# Patient Record
Sex: FEMALE | Race: WHITE | Employment: UNEMPLOYED | URBAN - METROPOLITAN AREA
[De-identification: names, ages, dates, MRNs, and addresses within clinical notes are randomized per-mention and may not be internally consistent; named-entity substitution may affect disease eponyms.]

---

## 2019-09-05 ENCOUNTER — APPOINTMENT (OUTPATIENT)
Dept: GENERAL RADIOLOGY | Age: 59
DRG: 061 | End: 2019-09-05
Attending: FAMILY MEDICINE
Payer: COMMERCIAL

## 2019-09-05 ENCOUNTER — APPOINTMENT (OUTPATIENT)
Dept: CT IMAGING | Age: 59
DRG: 061 | End: 2019-09-05
Attending: EMERGENCY MEDICINE
Payer: COMMERCIAL

## 2019-09-05 ENCOUNTER — HOSPITAL ENCOUNTER (INPATIENT)
Age: 59
LOS: 8 days | Discharge: HOME HEALTH | DRG: 061 | End: 2019-09-13
Attending: EMERGENCY MEDICINE | Admitting: FAMILY MEDICINE
Payer: COMMERCIAL

## 2019-09-05 DIAGNOSIS — R56.9 SEIZURE (HCC): Primary | ICD-10-CM

## 2019-09-05 DIAGNOSIS — I10 UNCONTROLLED HYPERTENSION: ICD-10-CM

## 2019-09-05 DIAGNOSIS — R41.82 ALTERED MENTAL STATUS, UNSPECIFIED ALTERED MENTAL STATUS TYPE: ICD-10-CM

## 2019-09-05 DIAGNOSIS — I47.20 VT (VENTRICULAR TACHYCARDIA): ICD-10-CM

## 2019-09-05 DIAGNOSIS — J44.9 CHRONIC OBSTRUCTIVE PULMONARY DISEASE, UNSPECIFIED COPD TYPE (HCC): ICD-10-CM

## 2019-09-05 DIAGNOSIS — R94.01 ABNORMAL EEG: ICD-10-CM

## 2019-09-05 PROBLEM — R79.89 ELEVATED SERUM CREATININE: Status: ACTIVE | Noted: 2019-09-05

## 2019-09-05 PROBLEM — R53.1 LEFT-SIDED WEAKNESS: Status: ACTIVE | Noted: 2019-09-05

## 2019-09-05 LAB
ALBUMIN SERPL-MCNC: 4.4 G/DL (ref 3.5–5)
ALBUMIN/GLOB SERPL: 1.1 {RATIO} (ref 1.1–2.2)
ALP SERPL-CCNC: 91 U/L (ref 45–117)
ALT SERPL-CCNC: 25 U/L (ref 12–78)
ANION GAP SERPL CALC-SCNC: 7 MMOL/L (ref 5–15)
ARTERIAL PATENCY WRIST A: ABNORMAL
AST SERPL-CCNC: 14 U/L (ref 15–37)
BASOPHILS # BLD: 0.2 K/UL (ref 0–0.1)
BASOPHILS NFR BLD: 1 % (ref 0–1)
BDY SITE: ABNORMAL
BILIRUB SERPL-MCNC: 0.5 MG/DL (ref 0.2–1)
BUN SERPL-MCNC: 20 MG/DL (ref 6–20)
BUN/CREAT SERPL: 18 (ref 12–20)
CALCIUM SERPL-MCNC: 9.3 MG/DL (ref 8.5–10.1)
CHLORIDE SERPL-SCNC: 101 MMOL/L (ref 97–108)
CO2 SERPL-SCNC: 32 MMOL/L (ref 21–32)
CREAT SERPL-MCNC: 1.13 MG/DL (ref 0.55–1.02)
DIFFERENTIAL METHOD BLD: ABNORMAL
EOSINOPHIL # BLD: 0.2 K/UL (ref 0–0.4)
EOSINOPHIL NFR BLD: 1 % (ref 0–7)
ERYTHROCYTE [DISTWIDTH] IN BLOOD BY AUTOMATED COUNT: 12.4 % (ref 11.5–14.5)
GAS FLOW.O2 O2 DELIVERY SYS: ABNORMAL L/MIN
GAS FLOW.O2 SETTING OXYMISER: 3.5 L/M
GLOBULIN SER CALC-MCNC: 3.9 G/DL (ref 2–4)
GLUCOSE SERPL-MCNC: 233 MG/DL (ref 65–100)
HCT VFR BLD AUTO: 45.5 % (ref 35–47)
HGB BLD-MCNC: 14.4 G/DL (ref 11.5–16)
IMM GRANULOCYTES # BLD AUTO: 0.3 K/UL (ref 0–0.04)
IMM GRANULOCYTES NFR BLD AUTO: 2 % (ref 0–0.5)
INR PPP: 1 (ref 0.9–1.1)
LYMPHOCYTES # BLD: 2.5 K/UL (ref 0.8–3.5)
LYMPHOCYTES NFR BLD: 15 % (ref 12–49)
MCH RBC QN AUTO: 31 PG (ref 26–34)
MCHC RBC AUTO-ENTMCNC: 31.6 G/DL (ref 30–36.5)
MCV RBC AUTO: 98.1 FL (ref 80–99)
MONOCYTES # BLD: 1.2 K/UL (ref 0–1)
MONOCYTES NFR BLD: 7 % (ref 5–13)
NEUTS SEG # BLD: 12.3 K/UL (ref 1.8–8)
NEUTS SEG NFR BLD: 74 % (ref 32–75)
NRBC # BLD: 0 K/UL (ref 0–0.01)
NRBC BLD-RTO: 0 PER 100 WBC
PCO2 BLDV: >90 MMHG (ref 41–51)
PH BLDV: 7.16 [PH] (ref 7.32–7.42)
PLATELET # BLD AUTO: 266 K/UL (ref 150–400)
PMV BLD AUTO: 11.4 FL (ref 8.9–12.9)
PO2 BLDV: 56 MMHG (ref 25–40)
POTASSIUM SERPL-SCNC: 3.7 MMOL/L (ref 3.5–5.1)
PROT SERPL-MCNC: 8.3 G/DL (ref 6.4–8.2)
PROTHROMBIN TIME: 9.9 SEC (ref 9–11.1)
RBC # BLD AUTO: 4.64 M/UL (ref 3.8–5.2)
RBC MORPH BLD: ABNORMAL
SODIUM SERPL-SCNC: 140 MMOL/L (ref 136–145)
SPECIMEN TYPE: ABNORMAL
WBC # BLD AUTO: 16.7 K/UL (ref 3.6–11)

## 2019-09-05 PROCEDURE — 70496 CT ANGIOGRAPHY HEAD: CPT

## 2019-09-05 PROCEDURE — 96375 TX/PRO/DX INJ NEW DRUG ADDON: CPT

## 2019-09-05 PROCEDURE — 74011636320 HC RX REV CODE- 636/320: Performed by: RADIOLOGY

## 2019-09-05 PROCEDURE — 70450 CT HEAD/BRAIN W/O DYE: CPT

## 2019-09-05 PROCEDURE — 80053 COMPREHEN METABOLIC PANEL: CPT

## 2019-09-05 PROCEDURE — 96366 THER/PROPH/DIAG IV INF ADDON: CPT

## 2019-09-05 PROCEDURE — 85025 COMPLETE CBC W/AUTO DIFF WBC: CPT

## 2019-09-05 PROCEDURE — 74011000258 HC RX REV CODE- 258: Performed by: RADIOLOGY

## 2019-09-05 PROCEDURE — 82803 BLOOD GASES ANY COMBINATION: CPT

## 2019-09-05 PROCEDURE — 65610000006 HC RM INTENSIVE CARE

## 2019-09-05 PROCEDURE — 74011000258 HC RX REV CODE- 258: Performed by: EMERGENCY MEDICINE

## 2019-09-05 PROCEDURE — 71045 X-RAY EXAM CHEST 1 VIEW: CPT

## 2019-09-05 PROCEDURE — 83036 HEMOGLOBIN GLYCOSYLATED A1C: CPT

## 2019-09-05 PROCEDURE — 95816 EEG AWAKE AND DROWSY: CPT | Performed by: FAMILY MEDICINE

## 2019-09-05 PROCEDURE — 31500 INSERT EMERGENCY AIRWAY: CPT

## 2019-09-05 PROCEDURE — 36415 COLL VENOUS BLD VENIPUNCTURE: CPT

## 2019-09-05 PROCEDURE — 96368 THER/DIAG CONCURRENT INF: CPT

## 2019-09-05 PROCEDURE — 85610 PROTHROMBIN TIME: CPT

## 2019-09-05 PROCEDURE — 99285 EMERGENCY DEPT VISIT HI MDM: CPT

## 2019-09-05 PROCEDURE — 93005 ELECTROCARDIOGRAM TRACING: CPT

## 2019-09-05 PROCEDURE — 74011000250 HC RX REV CODE- 250: Performed by: EMERGENCY MEDICINE

## 2019-09-05 PROCEDURE — 96365 THER/PROPH/DIAG IV INF INIT: CPT

## 2019-09-05 PROCEDURE — 3E03317 INTRODUCTION OF OTHER THROMBOLYTIC INTO PERIPHERAL VEIN, PERCUTANEOUS APPROACH: ICD-10-PCS | Performed by: EMERGENCY MEDICINE

## 2019-09-05 PROCEDURE — 74011250636 HC RX REV CODE- 250/636

## 2019-09-05 PROCEDURE — 94762 N-INVAS EAR/PLS OXIMTRY CONT: CPT

## 2019-09-05 PROCEDURE — 0042T CT CODE NEURO PERF W CBF: CPT

## 2019-09-05 PROCEDURE — 74011250636 HC RX REV CODE- 250/636: Performed by: EMERGENCY MEDICINE

## 2019-09-05 RX ORDER — LABETALOL HYDROCHLORIDE 5 MG/ML
10 INJECTION, SOLUTION INTRAVENOUS ONCE
Status: COMPLETED | OUTPATIENT
Start: 2019-09-05 | End: 2019-09-05

## 2019-09-05 RX ORDER — LABETALOL HYDROCHLORIDE 5 MG/ML
20 INJECTION, SOLUTION INTRAVENOUS ONCE
Status: COMPLETED | OUTPATIENT
Start: 2019-09-05 | End: 2019-09-05

## 2019-09-05 RX ORDER — SODIUM CHLORIDE 0.9 % (FLUSH) 0.9 %
10 SYRINGE (ML) INJECTION
Status: COMPLETED | OUTPATIENT
Start: 2019-09-05 | End: 2019-09-05

## 2019-09-05 RX ORDER — MAGNESIUM SULFATE 100 %
4 CRYSTALS MISCELLANEOUS AS NEEDED
Status: DISCONTINUED | OUTPATIENT
Start: 2019-09-05 | End: 2019-09-13 | Stop reason: HOSPADM

## 2019-09-05 RX ORDER — DEXTROSE 50 % IN WATER (D50W) INTRAVENOUS SYRINGE
25-50 AS NEEDED
Status: DISCONTINUED | OUTPATIENT
Start: 2019-09-05 | End: 2019-09-05 | Stop reason: CLARIF

## 2019-09-05 RX ORDER — SODIUM CHLORIDE 9 MG/ML
50 INJECTION, SOLUTION INTRAVENOUS ONCE
Status: COMPLETED | OUTPATIENT
Start: 2019-09-05 | End: 2019-09-06

## 2019-09-05 RX ORDER — ALBUTEROL SULFATE 90 UG/1
1 AEROSOL, METERED RESPIRATORY (INHALATION)
COMMUNITY

## 2019-09-05 RX ORDER — FUROSEMIDE 40 MG/1
40 TABLET ORAL DAILY
COMMUNITY
End: 2019-11-21 | Stop reason: SDUPTHER

## 2019-09-05 RX ORDER — INSULIN LISPRO 100 [IU]/ML
INJECTION, SOLUTION INTRAVENOUS; SUBCUTANEOUS EVERY 6 HOURS
Status: DISCONTINUED | OUTPATIENT
Start: 2019-09-05 | End: 2019-09-13 | Stop reason: HOSPADM

## 2019-09-05 RX ORDER — SODIUM CHLORIDE 9 MG/ML
50 INJECTION, SOLUTION INTRAVENOUS ONCE
Status: COMPLETED | OUTPATIENT
Start: 2019-09-05 | End: 2019-09-05

## 2019-09-05 RX ADMIN — LEVETIRACETAM 2000 MG: 100 INJECTION, SOLUTION INTRAVENOUS at 23:57

## 2019-09-05 RX ADMIN — ALTEPLASE 81 MG: KIT at 21:52

## 2019-09-05 RX ADMIN — LABETALOL HYDROCHLORIDE 20 MG: 5 INJECTION INTRAVENOUS at 22:05

## 2019-09-05 RX ADMIN — IOPAMIDOL 120 ML: 755 INJECTION, SOLUTION INTRAVENOUS at 21:40

## 2019-09-05 RX ADMIN — SODIUM CHLORIDE 5 MG/HR: 900 INJECTION, SOLUTION INTRAVENOUS at 21:55

## 2019-09-05 RX ADMIN — SODIUM CHLORIDE 50 ML: 900 INJECTION, SOLUTION INTRAVENOUS at 23:40

## 2019-09-05 RX ADMIN — LABETALOL HYDROCHLORIDE 10 MG: 5 INJECTION INTRAVENOUS at 21:52

## 2019-09-05 RX ADMIN — Medication 10 ML: at 21:40

## 2019-09-05 RX ADMIN — SODIUM CHLORIDE 100 ML: 900 INJECTION, SOLUTION INTRAVENOUS at 21:40

## 2019-09-05 RX ADMIN — SODIUM CHLORIDE 50 ML: 900 INJECTION, SOLUTION INTRAVENOUS at 22:52

## 2019-09-05 NOTE — Clinical Note
TRANSFER - OUT REPORT:  
 
Verbal report given to: tavon. Report consisted of patient's Situation, Background, Assessment and  
Recommendations(SBAR). Opportunity for questions and clarification was provided. Patient transported with a Registered Nurse. Patient transported to: Recovery room.

## 2019-09-06 ENCOUNTER — APPOINTMENT (OUTPATIENT)
Dept: MRI IMAGING | Age: 59
DRG: 061 | End: 2019-09-06
Attending: INTERNAL MEDICINE
Payer: COMMERCIAL

## 2019-09-06 ENCOUNTER — APPOINTMENT (OUTPATIENT)
Dept: GENERAL RADIOLOGY | Age: 59
DRG: 061 | End: 2019-09-06
Attending: EMERGENCY MEDICINE
Payer: COMMERCIAL

## 2019-09-06 ENCOUNTER — APPOINTMENT (OUTPATIENT)
Dept: NON INVASIVE DIAGNOSTICS | Age: 59
DRG: 061 | End: 2019-09-06
Attending: INTERNAL MEDICINE
Payer: COMMERCIAL

## 2019-09-06 ENCOUNTER — APPOINTMENT (OUTPATIENT)
Dept: GENERAL RADIOLOGY | Age: 59
DRG: 061 | End: 2019-09-06
Attending: FAMILY MEDICINE
Payer: COMMERCIAL

## 2019-09-06 LAB
AMMONIA PLAS-SCNC: 22 UMOL/L
AMPHET UR QL SCN: NEGATIVE
APAP SERPL-MCNC: <2 UG/ML (ref 10–30)
ATRIAL RATE: 92 BPM
BARBITURATES UR QL SCN: NEGATIVE
BENZODIAZ UR QL: POSITIVE
BNP SERPL-MCNC: 1065 PG/ML
CALCULATED P AXIS, ECG09: 57 DEGREES
CALCULATED R AXIS, ECG10: 66 DEGREES
CALCULATED T AXIS, ECG11: 33 DEGREES
CANNABINOIDS UR QL SCN: NEGATIVE
CHOLEST SERPL-MCNC: 175 MG/DL
CK MB CFR SERPL CALC: 1.6 % (ref 0–2.5)
CK MB SERPL-MCNC: 1.3 NG/ML (ref 5–25)
CK SERPL-CCNC: 81 U/L (ref 26–192)
COCAINE UR QL SCN: NEGATIVE
DIAGNOSIS, 93000: NORMAL
DRUG SCRN COMMENT,DRGCM: ABNORMAL
EST. AVERAGE GLUCOSE BLD GHB EST-MCNC: 151 MG/DL
EST. AVERAGE GLUCOSE BLD GHB EST-MCNC: 160 MG/DL
ETHANOL SERPL-MCNC: <10 MG/DL
GLUCOSE BLD STRIP.AUTO-MCNC: 125 MG/DL (ref 65–100)
GLUCOSE BLD STRIP.AUTO-MCNC: 162 MG/DL (ref 65–100)
HBA1C MFR BLD: 6.9 % (ref 4.2–6.3)
HBA1C MFR BLD: 7.2 % (ref 4.2–6.3)
HDLC SERPL-MCNC: 39 MG/DL
HDLC SERPL: 4.5 {RATIO} (ref 0–5)
LACTATE SERPL-SCNC: 1.5 MMOL/L (ref 0.4–2)
LDLC SERPL CALC-MCNC: 97.8 MG/DL (ref 0–100)
LIPID PROFILE,FLP: ABNORMAL
METHADONE UR QL: NEGATIVE
OPIATES UR QL: NEGATIVE
P-R INTERVAL, ECG05: 202 MS
PCP UR QL: NEGATIVE
Q-T INTERVAL, ECG07: 390 MS
QRS DURATION, ECG06: 104 MS
QTC CALCULATION (BEZET), ECG08: 482 MS
SALICYLATES SERPL-MCNC: <1.7 MG/DL (ref 2.8–20)
SERVICE CMNT-IMP: ABNORMAL
SERVICE CMNT-IMP: ABNORMAL
TRIGL SERPL-MCNC: 191 MG/DL (ref ?–150)
TROPONIN I SERPL-MCNC: 0.07 NG/ML
TSH SERPL DL<=0.05 MIU/L-ACNC: 0.83 UIU/ML (ref 0.36–3.74)
VENTRICULAR RATE, ECG03: 92 BPM
VLDLC SERPL CALC-MCNC: 38.2 MG/DL

## 2019-09-06 PROCEDURE — 84443 ASSAY THYROID STIM HORMONE: CPT

## 2019-09-06 PROCEDURE — 94002 VENT MGMT INPAT INIT DAY: CPT

## 2019-09-06 PROCEDURE — 71045 X-RAY EXAM CHEST 1 VIEW: CPT

## 2019-09-06 PROCEDURE — 70551 MRI BRAIN STEM W/O DYE: CPT

## 2019-09-06 PROCEDURE — 65610000006 HC RM INTENSIVE CARE

## 2019-09-06 PROCEDURE — 74011000250 HC RX REV CODE- 250: Performed by: NURSE PRACTITIONER

## 2019-09-06 PROCEDURE — 83880 ASSAY OF NATRIURETIC PEPTIDE: CPT

## 2019-09-06 PROCEDURE — 0BH17EZ INSERTION OF ENDOTRACHEAL AIRWAY INTO TRACHEA, VIA NATURAL OR ARTIFICIAL OPENING: ICD-10-PCS | Performed by: ANESTHESIOLOGY

## 2019-09-06 PROCEDURE — 83036 HEMOGLOBIN GLYCOSYLATED A1C: CPT

## 2019-09-06 PROCEDURE — 94640 AIRWAY INHALATION TREATMENT: CPT

## 2019-09-06 PROCEDURE — 95816 EEG AWAKE AND DROWSY: CPT | Performed by: INTERNAL MEDICINE

## 2019-09-06 PROCEDURE — 74011250636 HC RX REV CODE- 250/636: Performed by: FAMILY MEDICINE

## 2019-09-06 PROCEDURE — 74011636637 HC RX REV CODE- 636/637: Performed by: FAMILY MEDICINE

## 2019-09-06 PROCEDURE — 84484 ASSAY OF TROPONIN QUANT: CPT

## 2019-09-06 PROCEDURE — 82962 GLUCOSE BLOOD TEST: CPT

## 2019-09-06 PROCEDURE — 80307 DRUG TEST PRSMV CHEM ANLYZR: CPT

## 2019-09-06 PROCEDURE — 74011250636 HC RX REV CODE- 250/636: Performed by: ANESTHESIOLOGY

## 2019-09-06 PROCEDURE — 74018 RADEX ABDOMEN 1 VIEW: CPT

## 2019-09-06 PROCEDURE — 80061 LIPID PANEL: CPT

## 2019-09-06 PROCEDURE — 82140 ASSAY OF AMMONIA: CPT

## 2019-09-06 PROCEDURE — 77030008771 HC TU NG SALEM SUMP -A

## 2019-09-06 PROCEDURE — 5A1945Z RESPIRATORY VENTILATION, 24-96 CONSECUTIVE HOURS: ICD-10-PCS | Performed by: EMERGENCY MEDICINE

## 2019-09-06 PROCEDURE — 74011000258 HC RX REV CODE- 258: Performed by: NURSE PRACTITIONER

## 2019-09-06 PROCEDURE — 74011250636 HC RX REV CODE- 250/636: Performed by: INTERNAL MEDICINE

## 2019-09-06 PROCEDURE — 82550 ASSAY OF CK (CPK): CPT

## 2019-09-06 PROCEDURE — 74011250636 HC RX REV CODE- 250/636

## 2019-09-06 PROCEDURE — 74011000250 HC RX REV CODE- 250: Performed by: INTERNAL MEDICINE

## 2019-09-06 PROCEDURE — 83605 ASSAY OF LACTIC ACID: CPT

## 2019-09-06 RX ORDER — SODIUM CHLORIDE 9 MG/ML
125 INJECTION, SOLUTION INTRAVENOUS CONTINUOUS
Status: DISPENSED | OUTPATIENT
Start: 2019-09-06 | End: 2019-09-07

## 2019-09-06 RX ORDER — LOSARTAN POTASSIUM 25 MG/1
25 TABLET ORAL DAILY
COMMUNITY
End: 2020-05-27 | Stop reason: SDUPTHER

## 2019-09-06 RX ORDER — MIDAZOLAM IN 0.9 % SOD.CHLORID 1 MG/ML
0-10 PLASTIC BAG, INJECTION (ML) INTRAVENOUS
Status: DISCONTINUED | OUTPATIENT
Start: 2019-09-06 | End: 2019-09-06

## 2019-09-06 RX ORDER — MIDAZOLAM HYDROCHLORIDE 1 MG/ML
5 INJECTION, SOLUTION INTRAMUSCULAR; INTRAVENOUS
Status: COMPLETED | OUTPATIENT
Start: 2019-09-06 | End: 2019-09-06

## 2019-09-06 RX ORDER — CARVEDILOL 6.25 MG/1
6.25 TABLET ORAL 2 TIMES DAILY
COMMUNITY
End: 2019-09-06

## 2019-09-06 RX ORDER — SODIUM CHLORIDE 0.9 % (FLUSH) 0.9 %
5-40 SYRINGE (ML) INJECTION AS NEEDED
Status: DISCONTINUED | OUTPATIENT
Start: 2019-09-06 | End: 2019-09-13 | Stop reason: HOSPADM

## 2019-09-06 RX ORDER — AMITRIPTYLINE HYDROCHLORIDE 25 MG/1
25 TABLET, FILM COATED ORAL
COMMUNITY
End: 2019-09-06

## 2019-09-06 RX ORDER — PROPOFOL 10 MG/ML
200 INJECTION, EMULSION INTRAVENOUS
Status: COMPLETED | OUTPATIENT
Start: 2019-09-06 | End: 2019-09-06

## 2019-09-06 RX ORDER — SODIUM CHLORIDE 0.9 % (FLUSH) 0.9 %
5-40 SYRINGE (ML) INJECTION EVERY 8 HOURS
Status: DISCONTINUED | OUTPATIENT
Start: 2019-09-06 | End: 2019-09-13 | Stop reason: HOSPADM

## 2019-09-06 RX ORDER — IPRATROPIUM BROMIDE AND ALBUTEROL SULFATE 2.5; .5 MG/3ML; MG/3ML
3 SOLUTION RESPIRATORY (INHALATION)
Status: DISCONTINUED | OUTPATIENT
Start: 2019-09-06 | End: 2019-09-11

## 2019-09-06 RX ORDER — NAPROXEN 375 MG/1
375 TABLET ORAL 2 TIMES DAILY WITH MEALS
COMMUNITY

## 2019-09-06 RX ORDER — CHLORHEXIDINE GLUCONATE 1.2 MG/ML
15 RINSE ORAL 2 TIMES DAILY
Status: DISCONTINUED | OUTPATIENT
Start: 2019-09-06 | End: 2019-09-09

## 2019-09-06 RX ORDER — PROPOFOL 10 MG/ML
0-50 VIAL (ML) INTRAVENOUS
Status: DISCONTINUED | OUTPATIENT
Start: 2019-09-06 | End: 2019-09-09

## 2019-09-06 RX ORDER — SUCCINYLCHOLINE CHLORIDE 20 MG/ML
200 INJECTION INTRAMUSCULAR; INTRAVENOUS
Status: COMPLETED | OUTPATIENT
Start: 2019-09-06 | End: 2019-09-06

## 2019-09-06 RX ORDER — PROPOFOL 10 MG/ML
INJECTION, EMULSION INTRAVENOUS
Status: COMPLETED
Start: 2019-09-06 | End: 2019-09-06

## 2019-09-06 RX ORDER — SODIUM CHLORIDE 9 MG/ML
125 INJECTION, SOLUTION INTRAVENOUS CONTINUOUS
Status: DISCONTINUED | OUTPATIENT
Start: 2019-09-06 | End: 2019-09-07

## 2019-09-06 RX ORDER — FENTANYL CITRATE 50 UG/ML
50 INJECTION, SOLUTION INTRAMUSCULAR; INTRAVENOUS
Status: DISCONTINUED | OUTPATIENT
Start: 2019-09-06 | End: 2019-09-09

## 2019-09-06 RX ORDER — MIDAZOLAM HYDROCHLORIDE 1 MG/ML
4 INJECTION, SOLUTION INTRAMUSCULAR; INTRAVENOUS
Status: COMPLETED | OUTPATIENT
Start: 2019-09-06 | End: 2019-09-06

## 2019-09-06 RX ORDER — PROPOFOL 10 MG/ML
INJECTION, EMULSION INTRAVENOUS
Status: DISPENSED
Start: 2019-09-06 | End: 2019-09-06

## 2019-09-06 RX ORDER — MIDAZOLAM HYDROCHLORIDE 1 MG/ML
INJECTION, SOLUTION INTRAMUSCULAR; INTRAVENOUS
Status: DISPENSED
Start: 2019-09-06 | End: 2019-09-06

## 2019-09-06 RX ORDER — PROPOFOL 10 MG/ML
0-50 VIAL (ML) INTRAVENOUS
Status: COMPLETED | OUTPATIENT
Start: 2019-09-06 | End: 2019-09-06

## 2019-09-06 RX ORDER — ASPIRIN 300 MG/1
300 SUPPOSITORY RECTAL DAILY
Status: DISCONTINUED | OUTPATIENT
Start: 2019-09-07 | End: 2019-09-09

## 2019-09-06 RX ORDER — LORAZEPAM 2 MG/ML
2 INJECTION INTRAMUSCULAR
Status: DISCONTINUED | OUTPATIENT
Start: 2019-09-06 | End: 2019-09-13 | Stop reason: HOSPADM

## 2019-09-06 RX ORDER — TORSEMIDE 20 MG/1
20 TABLET ORAL 2 TIMES DAILY
COMMUNITY
End: 2019-09-06

## 2019-09-06 RX ORDER — FACIAL-BODY WIPES
10 EACH TOPICAL DAILY PRN
Status: DISCONTINUED | OUTPATIENT
Start: 2019-09-06 | End: 2019-09-13 | Stop reason: HOSPADM

## 2019-09-06 RX ADMIN — SODIUM CHLORIDE 125 ML/HR: 900 INJECTION, SOLUTION INTRAVENOUS at 18:03

## 2019-09-06 RX ADMIN — PROPOFOL 10 MCG/KG/MIN: 10 INJECTION, EMULSION INTRAVENOUS at 04:59

## 2019-09-06 RX ADMIN — PROPOFOL 15 MCG/KG/MIN: 10 INJECTION, EMULSION INTRAVENOUS at 03:14

## 2019-09-06 RX ADMIN — PROPOFOL 50 MCG/KG/MIN: 10 INJECTION, EMULSION INTRAVENOUS at 15:42

## 2019-09-06 RX ADMIN — FAMOTIDINE 20 MG: 10 INJECTION, SOLUTION INTRAVENOUS at 22:51

## 2019-09-06 RX ADMIN — INSULIN LISPRO 2 UNITS: 100 INJECTION, SOLUTION INTRAVENOUS; SUBCUTANEOUS at 18:02

## 2019-09-06 RX ADMIN — SUCCINYLCHOLINE CHLORIDE 200 MG: 20 INJECTION, SOLUTION INTRAMUSCULAR; INTRAVENOUS at 00:32

## 2019-09-06 RX ADMIN — PROPOFOL 200 MG: 10 INJECTION, EMULSION INTRAVENOUS at 00:32

## 2019-09-06 RX ADMIN — CHLORHEXIDINE GLUCONATE 15 ML: 1.2 RINSE ORAL at 18:02

## 2019-09-06 RX ADMIN — MIDAZOLAM HYDROCHLORIDE 4 MG: 1 INJECTION, SOLUTION INTRAMUSCULAR; INTRAVENOUS at 01:00

## 2019-09-06 RX ADMIN — PROPOFOL 30 MCG/KG/MIN: 10 INJECTION, EMULSION INTRAVENOUS at 06:30

## 2019-09-06 RX ADMIN — MIDAZOLAM 5 MG: 1 INJECTION INTRAMUSCULAR; INTRAVENOUS at 07:19

## 2019-09-06 RX ADMIN — PROPOFOL 50 MCG/KG/MIN: 10 INJECTION, EMULSION INTRAVENOUS at 22:47

## 2019-09-06 RX ADMIN — PROPOFOL 15 MCG/KG/MIN: 10 INJECTION, EMULSION INTRAVENOUS at 06:18

## 2019-09-06 RX ADMIN — SODIUM CHLORIDE 0.4 MCG/KG/HR: 900 INJECTION, SOLUTION INTRAVENOUS at 20:26

## 2019-09-06 RX ADMIN — FAMOTIDINE 20 MG: 10 INJECTION, SOLUTION INTRAVENOUS at 10:55

## 2019-09-06 RX ADMIN — PROPOFOL 25 MCG/KG/MIN: 10 INJECTION, EMULSION INTRAVENOUS at 01:29

## 2019-09-06 RX ADMIN — PROPOFOL 50 MCG/KG/MIN: 10 INJECTION, EMULSION INTRAVENOUS at 10:59

## 2019-09-06 RX ADMIN — PROPOFOL 15 MCG/KG/MIN: 10 INJECTION, EMULSION INTRAVENOUS at 04:00

## 2019-09-06 RX ADMIN — PROPOFOL 15 MCG/KG/MIN: 10 INJECTION, EMULSION INTRAVENOUS at 04:40

## 2019-09-06 RX ADMIN — SODIUM CHLORIDE 125 ML/HR: 900 INJECTION, SOLUTION INTRAVENOUS at 02:01

## 2019-09-06 RX ADMIN — PROPOFOL 50 MCG/KG/MIN: 10 INJECTION, EMULSION INTRAVENOUS at 13:20

## 2019-09-06 RX ADMIN — PROPOFOL 50 MCG/KG/MIN: 10 INJECTION, EMULSION INTRAVENOUS at 06:34

## 2019-09-06 RX ADMIN — FENTANYL CITRATE 50 MCG: 50 INJECTION, SOLUTION INTRAMUSCULAR; INTRAVENOUS at 20:08

## 2019-09-06 RX ADMIN — IPRATROPIUM BROMIDE AND ALBUTEROL SULFATE 3 ML: .5; 3 SOLUTION RESPIRATORY (INHALATION) at 19:04

## 2019-09-06 RX ADMIN — PROPOFOL 50 MCG/KG/MIN: 10 INJECTION, EMULSION INTRAVENOUS at 20:22

## 2019-09-06 RX ADMIN — PROPOFOL 50 MCG/KG/MIN: 10 INJECTION, EMULSION INTRAVENOUS at 18:03

## 2019-09-06 RX ADMIN — MIDAZOLAM HYDROCHLORIDE 5 MG: 1 INJECTION, SOLUTION INTRAMUSCULAR; INTRAVENOUS at 01:29

## 2019-09-06 RX ADMIN — SODIUM CHLORIDE 500 ML: 900 INJECTION, SOLUTION INTRAVENOUS at 06:18

## 2019-09-06 RX ADMIN — FENTANYL CITRATE 50 MCG: 50 INJECTION, SOLUTION INTRAMUSCULAR; INTRAVENOUS at 12:32

## 2019-09-06 RX ADMIN — PROPOFOL 50 MCG/KG/MIN: 10 INJECTION, EMULSION INTRAVENOUS at 08:49

## 2019-09-06 RX ADMIN — LORAZEPAM 2 MG: 2 INJECTION INTRAMUSCULAR; INTRAVENOUS at 10:48

## 2019-09-06 RX ADMIN — SODIUM CHLORIDE 500 ML: 900 INJECTION, SOLUTION INTRAVENOUS at 00:59

## 2019-09-06 RX ADMIN — SODIUM CHLORIDE 500 ML: 900 INJECTION, SOLUTION INTRAVENOUS at 01:00

## 2019-09-06 RX ADMIN — SODIUM CHLORIDE 125 ML/HR: 900 INJECTION, SOLUTION INTRAVENOUS at 10:15

## 2019-09-06 RX ADMIN — IPRATROPIUM BROMIDE AND ALBUTEROL SULFATE 3 ML: .5; 3 SOLUTION RESPIRATORY (INHALATION) at 14:44

## 2019-09-06 NOTE — ED NOTES
Patient turned and cleaned, purewick leaked and bed wet with urine. Repositioned, purewick changed and pericare performed, ROM performed on arms. Patient resting quietly at this time, visible from nurses station.

## 2019-09-06 NOTE — ED NOTES
Dr. Du Betancourt notified we were unable to obtain a venous blood gas. Patient's IV did not pull blood. The other IV had propofol running and respiratory therapist reported blood can not be pulled off the line in which Propofol has run, despite flushing. Patient's end tidal CO2 is trending down, currently 40.
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
## 2019-09-06 NOTE — ED NOTES
Spoke with Team 4 hospitalist regarding patient more awake, per hospitalist, okay to give PRN fentanyl per Pulmonary's notes. Will contact Pulmonary on call if does not respond well to PRN dose.

## 2019-09-06 NOTE — DIABETES MGMT
[facility]    [facility]TC Progress Note    Recommendations/ Comments: Consult received for new dx diabetes. Pt on vent. Will complete consult when appropriate. BG> 200 mg/dL      If appropriate, please consider: If BG's remain > 180 mg/dL, add Lantus -  wt based dose is 29 units daily (0.2 units/kg x 148 kg)  Change lispro correction scale to resistant    Current hospital DM medication:   Lispro normal sensitivity correction scale      Chart reviewed on Polina Castaneda. Patient is a 64 y.o. female with no hx DM     A1c:   Lab Results   Component Value Date/Time    Hemoglobin A1c 7.2 (H) 09/06/2019 08:56 AM    Hemoglobin A1c 6.9 (H) 09/05/2019 09:04 PM       Recent Glucose Results:   Lab Results   Component Value Date/Time     (H) 09/05/2019 09:04 PM        Lab Results   Component Value Date/Time    Creatinine 1.13 (H) 09/05/2019 09:04 PM     CrCl cannot be calculated (Unknown ideal weight. ). Active Orders   Diet    DIET NPO        PO intake: No data found. Will continue to follow as needed.     Thank you  Aryan Ballard RN, CDE        Time spent: 7 min
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
## 2019-09-06 NOTE — ED NOTES
Orogastric tube inserted per Dr. Gloria Limon verbal order. Following discussion with ED physician and anesthesiologist, decision to insert tube was made due to benefits outweighing risks of insertion following TPA administration.

## 2019-09-06 NOTE — PROGRESS NOTES
Admission Medication Reconciliation:    Information obtained from:  RX Query (which is now current, but no yesterday--have attached yesterdays' ivent), her friends/housemates  RxQuery data available¹:  YES--was unavailable yesterday    Comments/Recommendations: Updated PTA meds/reviewed patient's allergies. From yesterday: PLEASE SCROLL DOWN FOR TODAY'S NOTE    Comments/Recommendations: Updated PTA meds/reviewed patient's allergies. Patient is not able to provide information due to clinical status. Please consider this med list IN PROGRESS ONLY. Notes:  1. Karla Hein [facility]: [phone] [facility] store): called because unable to find local stores which are open at this time. Technician stated that there were NO active RX at this time \"per the data host,\" and that the host system at [facility] was not responding. She was able to tell me that patient uses [employer] store , and that the only RX noted there was for Lasix. 2. Albuterol added as per friends--apparently there are medication bottles at home, have asked friends to bring in for pharmacy to review. They do not believe that she uses any other pharmacy. 3. Allergies: unable to determine from friends and the pharmacist at [facility] stated there was no information on file. 4295: Spoke with friends again, they state that patient came home with several medications, they will bring in tomorrow morning. Explained my actions thus far with respect to gathering information, will update tomorrow when able. Medication changes (since last review): Added  Albuterol  Lasix    TODAY: friends supplied list, RX Query available  Medication changes (since last review): Added  Losartan  Naproxen  Anoro Ellipta  Flovent    Removed  Elavil; old prescription  Torsemide-unknown if current therapy, out of drug  Carvedilol--unknown if current therapy, out of drug    Thank you for allowing me to participate in the care of your patient.     Chata Villa Jhony Tamayo PharmD, RN #[id number]       [facility] benefit data reflects medications filled and processed through the patient's insurance, however   this data does NOT capture whether the medication was picked up or is currently being taken by the patient. Allergies:  Pcn [penicillins]    Significant PMH/Disease States:   Past Medical History:   Diagnosis Date    Chronic obstructive pulmonary disease ([facility] [address]     Hypertension      Chief Complaint for this Admission:    Chief Complaint   Patient presents with    Extremity Weakness     Prior to Admission Medications:   Prior to Admission Medications   Prescriptions Last Dose Informant Patient Reported? Taking? albuterol (PROVENTIL HFA, VENTOLIN HFA, PROAIR HFA) 90 mcg/actuation inhaler Unknown at Unknown time  Yes No   Sig: Take 1 Puff by inhalation every six (6) hours as needed for Wheezing. fluticasone propionate (FLOVENT DISKUS) 250 mcg/actuation dsdv Unknown at Unknown time  Yes No   Sig: Take 250 mcg by inhalation two (2) times a day. furosemide (LASIX) 40 mg tablet Unknown at Unknown time  Yes No   Sig: Take 40 mg by mouth daily. losartan (COZAAR) 25 mg tablet Unknown at Unknown time  Yes No   Sig: Take 25 mg by mouth daily. naproxen (NAPROSYN) 375 mg tablet Unknown at Unknown time  Yes No   Sig: Take 375 mg by mouth two (2) times daily (with meals). umeclidinium-vilanterol (ANORO ELLIPTA) 62.5-25 mcg/actuation inhaler Unknown at Unknown time  Yes No   Sig: Take 1 Puff by inhalation daily. Facility-Administered Medications: None       Please contact the main inpatient pharmacy with any questions or concerns at [phone] and we will direct you to the clinical pharmacist covering this patient's care while in-house.    AUGUSTO Ortega

## 2019-09-06 NOTE — PROGRESS NOTES
Intubation Note    Called to bedside secondary to  respiratory failure. Patient pre-oxygenated with 100% oxygen. RSI with propofol 200mg + succinylcholine 200 mg IVP. C-MAC #3 x1 attempt. 2 .    7.5 OETT taped and secured. .+ bilateral BS's, + chest rise, + ETCO2. CXR pending. Ventilator settings as per Critical Care attending physician. Dodie Patiño

## 2019-09-06 NOTE — ED NOTES
Patient appears to have full, purposeful use of her left arm, but patient has neglect to the left side neglect when asked to move or look to the left.

## 2019-09-06 NOTE — ED NOTES
Dr. Sebastián Simpson notified patient's blood pressure is 84 systolic. After lowering Propofol twice. Fluid bolus ordered.

## 2019-09-06 NOTE — PROGRESS NOTES
Neurocritical Care Code Stroke Documentation    Symptoms:   Left sided weakness, right gaze preference, witness sz by EMS, no prior hx of seizure   Last Known Well:  2010 this evening    Medical hx:   HTN   Anticoagulation:  No   VAN:   Positive   Imaging:       CT head: No acute intracranial abnormality    CTA head and neck: no LVO present    CTP: No perfusion abnormality     Images independently reviewed by myself and Dr. Abdifatah Casillas   Plan:   TPA Candidate: YES    Mechanical thrombectomy Candidate: NO     Discussed with: Dr. Abdifatah Casillas, Dr. Joi Lerma and Dr. Cecilia Luke. Likely pt is post-ictal, exam gradually improving.     Cherelle Quigley NP  Neurocritical Care Nurse Practitioner  [phone]

## 2019-09-06 NOTE — ED NOTES
Dr. Kelsi Cummings had ordered bladder scan. Bladder scan not performed because patient was found to have voided a large amount of urine  urine on pads in bed. Patient and bed cleaned.

## 2019-09-06 NOTE — ED NOTES
Pt arrived via EMS. Directly to CT.   Dr. Zita Mata, NeuroInterventional ACP, and RN met patient in CT

## 2019-09-06 NOTE — PROGRESS NOTES
I now have results of VBG showing:  Results for Angela Mccabe (MRN [id number]) as of 9/6/2019 00:15   Ref. Range 9/5/2019 23:48   Specimen type (POC) Latest Units:   VENOUS BLOOD   pH, venous (POC) Latest Ref Range: 7.32 - 7.42   7.157 (LL)   pCO2, venous (POC) Latest Ref Range: 41 - 51 MMHG >90.0 (H)   pO2, venous (POC) Latest Ref Range: 25 - 40 mmHg 56 (H)   Flow rate (POC) Latest Units: L/M 3.5   Site Latest Units:   OTHER   Device: Latest Units:   NASAL CANNULA   Allens test (POC) Latest Units:   N/A     Per further discussion with patient's 2 roommates, the patient has h/o chronic hypoxic respiratory failure, COPD, CORWIN. She is chronically on 2 liters O2 via NC 24 hrs a day. Patient has been hospitalized twice this year, reportedly presenting to the ED confused, noted to be hypercapnic and required ETT intubation on both prior admissions. This is similar to today's presentation. Patient needs intubation and mechanical ventilator support to correct her uncompensated primary respiratory acidosis/ hypercapnia. In light of her confusion with recent reported seizures, patient is at risk for aspiration. There are risk of bleeding with recent tPA but the benefits of intubation/ mechanical ventilation with securing patient's airway and correction of acute respiratory acidosis outweigh risks. I have discussed with ER MDs Dr. Faizan Pierre and Dr. Wili Hutson and will consult anesthesia for intubation. Consult pulmonologist/ intensivist today for further ventilator management.

## 2019-09-06 NOTE — PROGRESS NOTES
Speech Pathology:  Chart reviewed and note patient intubated with vent support. Patient not appropriate for PO trials at this time. Dysphagia evaluation deferred. Thank you.     Judy Acuna, SLP

## 2019-09-06 NOTE — ED NOTES
Transferred care of patient to Arma Burkitt, RN with verbal bedside report. Patient remains sedated, and blood pressures maintaining within normal range.

## 2019-09-06 NOTE — PROGRESS NOTES
Occupational therapy 4069 -   09.06.2019    Chart reviewed, patient received sedated and on vent. Per ABCDE protocol, will work with patient when PEEP is 10.0 or less, FIO2 60% or less (FIO2 100%, PEEP 8.0), and patient is following basic commands. Will follow patient peripherally. Recommend nursing to complete with patient, as able, in order to promote cardiopulmonary systems, maintain strength, endurance and independence:   -bed in chair position with foot board on 3x/day ~30-60 mins each  -passive ROM during bathing B UEs and LEs  -positioning to prevent contractures and edema. Thank you for your assistance.

## 2019-09-06 NOTE — PROGRESS NOTES
Admission Medication Reconciliation:    Information obtained from:  [address]: [phone]  RxQuery data available¹:  NO    Comments/Recommendations: Updated PTA meds/reviewed patient's allergies. Patient is not able to provide information due to clinical status. Please consider this med list IN PROGRESS ONLY. Notes:  1. [facility] [place] Marieet: [phone] [facility] store): called because unable to find local stores which are open at this time. Technician stated that there were NO active RX at this time \"per the data host,\" and that the host system at [facility] was not responding. She was juan to tell me that patient uses [employer] , and that the only RX noted there was for Lasix. 2. Albuterol added as per friends--apparently there are medication bottles at home, have asked friends to bring in for pharmacy to review. They do not believe that she uses any other pharmacy. 3. Allergies: unable to determine from friends and the pharmacist at [facility] stated there was no information on file. 2258: Spoke with friends again, they state that patient came home with several medications, they will bring in tomorrow morning. Explained my actions thus far with respect to gathering information, will update tomorrow when able. Medication changes (since last review): Added  1. Albuterol  2. Lasix  Thank you for allowing me to participate in the care of your patient. Lisbeth Aguirre PharmD, RN #[id number]           [facility] benefit data reflects medications filled and processed through the patient's insurance, however   this data does NOT capture whether the medication was picked up or is currently being taken by the patient. Allergies:  Patient has no allergy information on record. Significant PMH/Disease States: No past medical history on file. Chief Complaint for this Admission:  No chief complaint on file.     Prior to Admission Medications:   Prior to Admission Medications Prescriptions Last Dose Informant Patient Reported? Taking? albuterol (PROVENTIL HFA, VENTOLIN HFA, PROAIR HFA) 90 mcg/actuation inhaler   Yes Yes   Sig: Take 1 Puff by inhalation every six (6) hours as needed for Wheezing. furosemide (LASIX) 40 mg tablet   Yes Yes   Sig: Take 40 mg by mouth daily. Facility-Administered Medications: None       Please contact the main inpatient pharmacy with any questions or concerns at [phone] and we will direct you to the clinical pharmacist covering this patient's care while in-house.    AUGUSTO Youngblood

## 2019-09-06 NOTE — ED NOTES
Dr. Dino Flores, day hospitalist notified patient is maxed on Propofol, and remains agitated. A one-time order of Versed was ordered.
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
## 2019-09-06 NOTE — ED NOTES
Dr. Lawyer Connelly, Dr. Yun Patel Dr.  , respiratory and RNs at the bedside. Intubation supplies, and emergency equipment at the bedside. Patient preoxygenated. A time out was performed. Dr. Kristine Benavidez administered 200 mg of Diprivan and 200 mg of succinylcholine at 0032. Patient intubated by Dr. Kristine Benavidez with 7.5 tube color change confirmed, Dr. Lawyer Connelly confirmed auscultated lung sounds.

## 2019-09-06 NOTE — ED NOTES
Patient to CT for CTA/CTP. Teleneurologist continues to obtain information for assessment. Patient appeared to have neglect on the left arm. While moving from stretcher to CT table, patient moved left arm on her own from her side to above her head. With friends and discussion last know well was 1800.

## 2019-09-06 NOTE — ED NOTES
Patient placed onto hospital bed, wet linens from leaking purewick changed, patient turned and repositioned.

## 2019-09-06 NOTE — PROGRESS NOTES
Physical therapy 1136 -   09.06.2019     Chart reviewed, patient received sedated and on vent. Per ABCDE protocol, will work with patient when PEEP is 10.0 or less, FIO2 60% or less (FIO2 100%, PEEP 8.0), and patient is following basic commands.  Therapy will follow patient peripherally.

## 2019-09-07 ENCOUNTER — APPOINTMENT (OUTPATIENT)
Dept: GENERAL RADIOLOGY | Age: 59
DRG: 061 | End: 2019-09-07
Attending: INTERNAL MEDICINE
Payer: COMMERCIAL

## 2019-09-07 ENCOUNTER — APPOINTMENT (OUTPATIENT)
Dept: NON INVASIVE DIAGNOSTICS | Age: 59
DRG: 061 | End: 2019-09-07
Attending: INTERNAL MEDICINE
Payer: COMMERCIAL

## 2019-09-07 LAB
ALBUMIN SERPL-MCNC: 2.8 G/DL (ref 3.5–5)
ALBUMIN/GLOB SERPL: 1.1 {RATIO} (ref 1.1–2.2)
ALP SERPL-CCNC: 46 U/L (ref 45–117)
ALT SERPL-CCNC: 15 U/L (ref 12–78)
ANION GAP SERPL CALC-SCNC: 8 MMOL/L (ref 5–15)
ARTERIAL PATENCY WRIST A: YES
AST SERPL-CCNC: 10 U/L (ref 15–37)
BASE EXCESS BLD CALC-SCNC: 3 MMOL/L
BASOPHILS # BLD: 0 K/UL (ref 0–0.1)
BASOPHILS NFR BLD: 0 % (ref 0–1)
BDY SITE: ABNORMAL
BILIRUB SERPL-MCNC: 0.3 MG/DL (ref 0.2–1)
BUN SERPL-MCNC: 11 MG/DL (ref 6–20)
BUN/CREAT SERPL: 14 (ref 12–20)
CALCIUM SERPL-MCNC: 7.8 MG/DL (ref 8.5–10.1)
CHLORIDE SERPL-SCNC: 111 MMOL/L (ref 97–108)
CK MB CFR SERPL CALC: NORMAL % (ref 0–2.5)
CK MB SERPL-MCNC: <1 NG/ML (ref 5–25)
CK SERPL-CCNC: 50 U/L (ref 26–192)
CO2 SERPL-SCNC: 27 MMOL/L (ref 21–32)
CREAT SERPL-MCNC: 0.77 MG/DL (ref 0.55–1.02)
DIFFERENTIAL METHOD BLD: ABNORMAL
ECHO AO ROOT DIAM: 3.34 CM
ECHO AV AREA PEAK VELOCITY: 2.3 CM2
ECHO AV CUSP MM: 2.24 CM
ECHO AV PEAK GRADIENT: 9.2 MMHG
ECHO AV PEAK VELOCITY: 151.59 CM/S
ECHO AV REGURGITANT PHT: 659.6 CM
ECHO LA MAJOR AXIS: 4.15 CM
ECHO LA TO AORTIC ROOT RATIO: 1.24
ECHO LV E' LATERAL VELOCITY: 7.12 CM/S
ECHO LV E' SEPTAL VELOCITY: 7.39 CM/S
ECHO LV INTERNAL DIMENSION DIASTOLIC: 4.56 CM (ref 3.9–5.3)
ECHO LV INTERNAL DIMENSION SYSTOLIC: 2.93 CM
ECHO LV IVSD: 1.16 CM (ref 0.6–0.9)
ECHO LV MASS 2D: 212.5 G (ref 67–162)
ECHO LV MASS INDEX 2D: 88.7 G/M2 (ref 43–95)
ECHO LV POSTERIOR WALL DIASTOLIC: 1.07 CM (ref 0.6–0.9)
ECHO LVOT DIAM: 1.79 CM
ECHO LVOT PEAK GRADIENT: 7.9 MMHG
ECHO LVOT PEAK VELOCITY: 140.2 CM/S
ECHO MV A VELOCITY: 73.07 CM/S
ECHO MV AREA PHT: 2.5 CM2
ECHO MV E DECELERATION TIME (DT): 297.8 MS
ECHO MV E VELOCITY: 84.42 CM/S
ECHO MV E/A RATIO: 1.16
ECHO MV E/E' LATERAL: 11.86
ECHO MV E/E' RATIO (AVERAGED): 11.64
ECHO MV E/E' SEPTAL: 11.42
ECHO MV PRESSURE HALF TIME (PHT): 86.4 MS
ECHO PV MAX VELOCITY: 120.67 CM/S
ECHO PV PEAK GRADIENT: 5.8 MMHG
ECHO RV INTERNAL DIMENSION: 3.41 CM
ECHO RV TAPSE: 2.5 CM (ref 1.5–2)
ECHO TV REGURGITANT MAX VELOCITY: 183.58 CM/S
ECHO TV REGURGITANT PEAK GRADIENT: 13.5 MMHG
EOSINOPHIL # BLD: 0.2 K/UL (ref 0–0.4)
EOSINOPHIL NFR BLD: 2 % (ref 0–7)
ERYTHROCYTE [DISTWIDTH] IN BLOOD BY AUTOMATED COUNT: 12.6 % (ref 11.5–14.5)
GAS FLOW.O2 O2 DELIVERY SYS: ABNORMAL L/MIN
GAS FLOW.O2 SETTING OXYMISER: 16 BPM
GLOBULIN SER CALC-MCNC: 2.6 G/DL (ref 2–4)
GLUCOSE BLD STRIP.AUTO-MCNC: 131 MG/DL (ref 65–100)
GLUCOSE BLD STRIP.AUTO-MCNC: 138 MG/DL (ref 65–100)
GLUCOSE BLD STRIP.AUTO-MCNC: 156 MG/DL (ref 65–100)
GLUCOSE BLD STRIP.AUTO-MCNC: 179 MG/DL (ref 65–100)
GLUCOSE BLD STRIP.AUTO-MCNC: 188 MG/DL (ref 65–100)
GLUCOSE SERPL-MCNC: 159 MG/DL (ref 65–100)
HCO3 BLD-SCNC: 28.7 MMOL/L (ref 22–26)
HCT VFR BLD AUTO: 34.4 % (ref 35–47)
HGB BLD-MCNC: 11.2 G/DL (ref 11.5–16)
IMM GRANULOCYTES # BLD AUTO: 0 K/UL (ref 0–0.04)
IMM GRANULOCYTES NFR BLD AUTO: 0 % (ref 0–0.5)
LYMPHOCYTES # BLD: 1.5 K/UL (ref 0.8–3.5)
LYMPHOCYTES NFR BLD: 17 % (ref 12–49)
MAGNESIUM SERPL-MCNC: 2 MG/DL (ref 1.6–2.4)
MCH RBC QN AUTO: 31.7 PG (ref 26–34)
MCHC RBC AUTO-ENTMCNC: 32.6 G/DL (ref 30–36.5)
MCV RBC AUTO: 97.5 FL (ref 80–99)
MONOCYTES # BLD: 0.6 K/UL (ref 0–1)
MONOCYTES NFR BLD: 7 % (ref 5–13)
NEUTS SEG # BLD: 6.5 K/UL (ref 1.8–8)
NEUTS SEG NFR BLD: 74 % (ref 32–75)
NRBC # BLD: 0.03 K/UL (ref 0–0.01)
NRBC BLD-RTO: 0.3 PER 100 WBC
O2/TOTAL GAS SETTING VFR VENT: 50 %
PCO2 BLD: 51.9 MMHG (ref 35–45)
PEEP RESPIRATORY: 8 CMH2O
PH BLD: 7.35 [PH] (ref 7.35–7.45)
PHOSPHATE SERPL-MCNC: 2.7 MG/DL (ref 2.6–4.7)
PISA AR MAX VEL: 314.76 CM/S
PLATELET # BLD AUTO: 194 K/UL (ref 150–400)
PMV BLD AUTO: 11.2 FL (ref 8.9–12.9)
PO2 BLD: 75 MMHG (ref 80–100)
POTASSIUM SERPL-SCNC: 3.2 MMOL/L (ref 3.5–5.1)
PROT SERPL-MCNC: 5.4 G/DL (ref 6.4–8.2)
RBC # BLD AUTO: 3.53 M/UL (ref 3.8–5.2)
SAO2 % BLD: 94 % (ref 92–97)
SERVICE CMNT-IMP: ABNORMAL
SODIUM SERPL-SCNC: 146 MMOL/L (ref 136–145)
SPECIMEN TYPE: ABNORMAL
TOTAL RESP. RATE, ITRR: 16
TROPONIN I SERPL-MCNC: <0.05 NG/ML
VENTILATION MODE VENT: ABNORMAL
VT SETTING VENT: 450 ML
WBC # BLD AUTO: 8.8 K/UL (ref 3.6–11)

## 2019-09-07 PROCEDURE — 94003 VENT MGMT INPAT SUBQ DAY: CPT

## 2019-09-07 PROCEDURE — 82803 BLOOD GASES ANY COMBINATION: CPT

## 2019-09-07 PROCEDURE — 84100 ASSAY OF PHOSPHORUS: CPT

## 2019-09-07 PROCEDURE — 36600 WITHDRAWAL OF ARTERIAL BLOOD: CPT

## 2019-09-07 PROCEDURE — 74011250636 HC RX REV CODE- 250/636: Performed by: FAMILY MEDICINE

## 2019-09-07 PROCEDURE — 65610000006 HC RM INTENSIVE CARE

## 2019-09-07 PROCEDURE — 74011000250 HC RX REV CODE- 250: Performed by: INTERNAL MEDICINE

## 2019-09-07 PROCEDURE — 94640 AIRWAY INHALATION TREATMENT: CPT

## 2019-09-07 PROCEDURE — 74011000250 HC RX REV CODE- 250: Performed by: NURSE PRACTITIONER

## 2019-09-07 PROCEDURE — 71045 X-RAY EXAM CHEST 1 VIEW: CPT

## 2019-09-07 PROCEDURE — 74011250636 HC RX REV CODE- 250/636: Performed by: HOSPITALIST

## 2019-09-07 PROCEDURE — 74011636637 HC RX REV CODE- 636/637: Performed by: FAMILY MEDICINE

## 2019-09-07 PROCEDURE — 93306 TTE W/DOPPLER COMPLETE: CPT

## 2019-09-07 PROCEDURE — 74011250637 HC RX REV CODE- 250/637: Performed by: INTERNAL MEDICINE

## 2019-09-07 PROCEDURE — 82962 GLUCOSE BLOOD TEST: CPT

## 2019-09-07 PROCEDURE — 83735 ASSAY OF MAGNESIUM: CPT

## 2019-09-07 PROCEDURE — 74011250636 HC RX REV CODE- 250/636: Performed by: INTERNAL MEDICINE

## 2019-09-07 PROCEDURE — 82550 ASSAY OF CK (CPK): CPT

## 2019-09-07 PROCEDURE — 80053 COMPREHEN METABOLIC PANEL: CPT

## 2019-09-07 PROCEDURE — 85025 COMPLETE CBC W/AUTO DIFF WBC: CPT

## 2019-09-07 PROCEDURE — 84484 ASSAY OF TROPONIN QUANT: CPT

## 2019-09-07 PROCEDURE — 74011000258 HC RX REV CODE- 258: Performed by: NURSE PRACTITIONER

## 2019-09-07 PROCEDURE — 36415 COLL VENOUS BLD VENIPUNCTURE: CPT

## 2019-09-07 RX ORDER — DEXTROSE, SODIUM CHLORIDE, AND POTASSIUM CHLORIDE 5; .45; .15 G/100ML; G/100ML; G/100ML
100 INJECTION INTRAVENOUS CONTINUOUS
Status: DISCONTINUED | OUTPATIENT
Start: 2019-09-07 | End: 2019-09-07

## 2019-09-07 RX ORDER — POTASSIUM CHLORIDE AND SODIUM CHLORIDE 450; 150 MG/100ML; MG/100ML
INJECTION, SOLUTION INTRAVENOUS CONTINUOUS
Status: DISCONTINUED | OUTPATIENT
Start: 2019-09-07 | End: 2019-09-08

## 2019-09-07 RX ORDER — POTASSIUM CHLORIDE 7.45 MG/ML
10 INJECTION INTRAVENOUS ONCE
Status: COMPLETED | OUTPATIENT
Start: 2019-09-07 | End: 2019-09-07

## 2019-09-07 RX ADMIN — Medication 10 ML: at 15:06

## 2019-09-07 RX ADMIN — INSULIN LISPRO 2 UNITS: 100 INJECTION, SOLUTION INTRAVENOUS; SUBCUTANEOUS at 06:55

## 2019-09-07 RX ADMIN — SODIUM CHLORIDE 0.5 MCG/KG/HR: 900 INJECTION, SOLUTION INTRAVENOUS at 00:50

## 2019-09-07 RX ADMIN — IPRATROPIUM BROMIDE AND ALBUTEROL SULFATE 3 ML: .5; 3 SOLUTION RESPIRATORY (INHALATION) at 19:02

## 2019-09-07 RX ADMIN — SODIUM CHLORIDE 0.2 MCG/KG/HR: 900 INJECTION, SOLUTION INTRAVENOUS at 19:21

## 2019-09-07 RX ADMIN — PROPOFOL 50 MCG/KG/MIN: 10 INJECTION, EMULSION INTRAVENOUS at 23:02

## 2019-09-07 RX ADMIN — IPRATROPIUM BROMIDE AND ALBUTEROL SULFATE 3 ML: .5; 3 SOLUTION RESPIRATORY (INHALATION) at 07:39

## 2019-09-07 RX ADMIN — ASPIRIN 300 MG: 300 SUPPOSITORY RECTAL at 10:34

## 2019-09-07 RX ADMIN — SODIUM CHLORIDE AND POTASSIUM CHLORIDE: 4.5; 1.49 INJECTION, SOLUTION INTRAVENOUS at 13:35

## 2019-09-07 RX ADMIN — IPRATROPIUM BROMIDE AND ALBUTEROL SULFATE 3 ML: .5; 3 SOLUTION RESPIRATORY (INHALATION) at 01:20

## 2019-09-07 RX ADMIN — PROPOFOL 45 MCG/KG/MIN: 10 INJECTION, EMULSION INTRAVENOUS at 10:34

## 2019-09-07 RX ADMIN — INSULIN LISPRO 2 UNITS: 100 INJECTION, SOLUTION INTRAVENOUS; SUBCUTANEOUS at 23:19

## 2019-09-07 RX ADMIN — PROPOFOL 50 MCG/KG/MIN: 10 INJECTION, EMULSION INTRAVENOUS at 08:07

## 2019-09-07 RX ADMIN — Medication 10 ML: at 06:48

## 2019-09-07 RX ADMIN — CHLORHEXIDINE GLUCONATE 15 ML: 1.2 RINSE ORAL at 18:19

## 2019-09-07 RX ADMIN — PROPOFOL 50 MCG/KG/MIN: 10 INJECTION, EMULSION INTRAVENOUS at 03:09

## 2019-09-07 RX ADMIN — PROPOFOL 50 MCG/KG/MIN: 10 INJECTION, EMULSION INTRAVENOUS at 18:27

## 2019-09-07 RX ADMIN — FAMOTIDINE 20 MG: 10 INJECTION, SOLUTION INTRAVENOUS at 10:34

## 2019-09-07 RX ADMIN — POTASSIUM CHLORIDE 10 MEQ: 10 INJECTION, SOLUTION INTRAVENOUS at 11:16

## 2019-09-07 RX ADMIN — SODIUM CHLORIDE 125 ML/HR: 900 INJECTION, SOLUTION INTRAVENOUS at 03:05

## 2019-09-07 RX ADMIN — FAMOTIDINE 20 MG: 10 INJECTION, SOLUTION INTRAVENOUS at 20:39

## 2019-09-07 RX ADMIN — PROPOFOL 50 MCG/KG/MIN: 10 INJECTION, EMULSION INTRAVENOUS at 05:28

## 2019-09-07 RX ADMIN — PROPOFOL 50 MCG/KG/MIN: 10 INJECTION, EMULSION INTRAVENOUS at 00:44

## 2019-09-07 RX ADMIN — IPRATROPIUM BROMIDE AND ALBUTEROL SULFATE 3 ML: .5; 3 SOLUTION RESPIRATORY (INHALATION) at 12:46

## 2019-09-07 RX ADMIN — CHLORHEXIDINE GLUCONATE 15 ML: 1.2 RINSE ORAL at 11:16

## 2019-09-07 RX ADMIN — PROPOFOL 50 MCG/KG/MIN: 10 INJECTION, EMULSION INTRAVENOUS at 20:43

## 2019-09-07 RX ADMIN — SODIUM CHLORIDE 0.3 MCG/KG/HR: 900 INJECTION, SOLUTION INTRAVENOUS at 06:57

## 2019-09-07 NOTE — CONSULTS
[id number] Winifred, NP  Neurocritical Care Nurse Practitioner  [phone]    Patient: Corinne Zarate MRN: [id number]  SSN: [id number]    YOB: 1960  Age: 64 y.o. Sex: female      Chief Complaint: AMS    Subjective:      Corinne Zarate is a 64 y.o. female with a pmh of COPD and HTN who presented to the ED via EMS on 9/5/19 due to altered mental status. Per friends at bedside, she was at a [address] filling her prescriptions when she had what they believe to be a seizure episode. EMS gave 5 mg versed enroute to ED. On arrival, she was noted with left sided weakness, aphasia and right gaze preference. A Code S was called and stat CT of her head was performed which showed no acute intracranial abnormality. CTA of her head and neck was then performed which was negative for LVO, and CTP showed no perfusion abnormalities. She was within the tPA window, so it was administered. Her right sided weakness improved while in the ED, NIHSS was 26 on arrival but gradually improved to 2. Friends are poor historians, unsure if she has ever had a seizure before, report she may have but that episode was not like this. Of note, she was hypoxic in the ED and blood gases showed hypercapnia, so she was intubated to correct her uncompensated primary respiratory acidosis/ hypercapnia. EEG was performed yesterday, which was abnormal due to diffuse beta activity. Unable to obtain meaningful ROS due to intubation/sedation. Past Medical History:   Diagnosis Date    Chronic obstructive pulmonary disease ([facility] [address]     Hypertension      No family history on file. Social History     Tobacco Use    Smoking status: Former Smoker    Smokeless tobacco: Never Used   Substance Use Topics    Alcohol use: Not on file      Prior to Admission Medications   Prescriptions Last Dose Informant Patient Reported? Taking?    albuterol (PROVENTIL HFA, VENTOLIN HFA, PROAIR HFA) 90 mcg/actuation inhaler Unknown at Unknown time  Yes No   Sig: Take 1 Puff by inhalation every six (6) hours as needed for Wheezing. fluticasone propionate (FLOVENT DISKUS) 250 mcg/actuation dsdv Unknown at Unknown time  Yes No   Sig: Take 250 mcg by inhalation two (2) times a day. furosemide (LASIX) 40 mg tablet Unknown at Unknown time  Yes No   Sig: Take 40 mg by mouth daily. losartan (COZAAR) 25 mg tablet Unknown at Unknown time  Yes No   Sig: Take 25 mg by mouth daily. naproxen (NAPROSYN) 375 mg tablet Unknown at Unknown time  Yes No   Sig: Take 375 mg by mouth two (2) times daily (with meals). umeclidinium-vilanterol (ANORO ELLIPTA) 62.5-25 mcg/actuation inhaler Unknown at Unknown time  Yes No   Sig: Take 1 Puff by inhalation daily. Facility-Administered Medications: None       Allergies   Allergen Reactions    Pcn [Penicillins] Unknown (comments)     Review of Systems:  Review of systems not obtained due to patient factors. Objective:     Vitals:    09/07/19 0300 09/07/19 0400 09/07/19 0401 09/07/19 0500   BP: 158/59 149/60  159/61   Pulse: (!) 54 (!) 51 (!) 52 (!) 48   Resp: 16 16 16 16   Temp: 98.2 °F (36.8 °C) 98.2 °F (36.8 °C)  98.2 °F (36.8 °C)   SpO2: 98% 99% 98% 98%   Weight:       Height:          Physical Exam:  GENERAL: NAD, intubated and sedated with precedex and propofol  SKIN: Warm, dry, color appropriate for ethnicity. NEURO: Eyes open to painful stimuli. PERRL, 3 mm bilaterally. No command following. No disconjugate gaze or gaze preference noted. Face symmetric. Withdraws to painful stimuli in all 4 extremities. Gait deferred.     Labs:  Lab Results   Component Value Date/Time    WBC 8.8 09/07/2019 04:47 AM    HGB 11.2 (L) 09/07/2019 04:47 AM    HCT 34.4 (L) 09/07/2019 04:47 AM    PLATELET 114 72/63/3677 04:47 AM    MCV 97.5 09/07/2019 04:47 AM      Lab Results   Component Value Date/Time    Sodium 146 (H) 09/07/2019 04:35 AM    Potassium 3.2 (L) 09/07/2019 04:35 AM    Chloride 111 (H) 09/07/2019 04:35 AM    CO2 27 09/07/2019 04:35 AM    Anion gap 8 09/07/2019 04:35 AM    Glucose 159 (H) 09/07/2019 04:35 AM    BUN 11 09/07/2019 04:35 AM    Creatinine 0.77 09/07/2019 04:35 AM    BUN/Creatinine ratio 14 09/07/2019 04:35 AM    GFR est AA >60 09/07/2019 04:35 AM    GFR est non-AA >60 09/07/2019 04:35 AM    Calcium 7.8 (L) 09/07/2019 04:35 AM     Imaging:  MRI Results (most recent):  Results from Hospital Encounter encounter on 09/05/19   MRI BRAIN WO CONT    Narrative BRAIN MRI WITHOUT CONTRAST: 9/6/2019 9:55 PM    INDICATION: Focal neuro deficit, new, fixed or worsening, 3-24 hours. Left  extremity weakness. COMPARISON: CT head 9/5/2019, CTA head and neck and CT perfusion 9/5/2019. TECHNIQUE: Images were obtained in multiple planes and sequences to emphasize  T1, T2, and T2* information. In addition, diffusion-weighted images and ADC maps  were also obtained. FINDINGS: The ventricles and sulci are appropriate for age. The main  intracranial flow-voids are normal. Scattered periventricular and subcortical  white matter signal abnormalities are consistent with chronic small vessel  ischemic disease. No restricted diffusion to suggest acute infarction. No  hemorrhage. Paranasal sinus mucosal disease is mild. There are bilateral mastoid  effusions. The examination is mildly motion limited. Impression IMPRESSION: No acute intracranial abnormality. CT Results (maximum last 3): Results from [place] encounter on 09/05/19   CT CODE NEURO PERF W CBF    Narrative *PRELIMINARY REPORT*    No acute occlusion    Preliminary report was provided by Dr. Angeles Godfrey, the on-call radiologist, at 2152  hours    Final report to follow. *END PRELIMINARY REPORT*  CLINICAL HISTORY: Altered mental status, left-sided weakness, seizure.     EXAMINATION:  CT ANGIOGRAPHY HEAD AND NECK, CT PERFUSION    COMPARISON: None    TECHNIQUE:  Following the uneventful administration of iodinated contrast  material, axial CT angiography of the head and neck was performed. Delayed axial  images through the head were also obtained. Coronal and sagittal reconstructions  were obtained. Manual postprocessing of images was performed. 3-D  Sagittal  maximal intensity projection images were obtained. 3-D Coronal maximal  intensity projections were obtained. CT brain perfusion was performed with  generation of hemodynamic maps of multiple parameters, including cerebral blood  flow, cerebral blood volume, mean transit time, and TMAX. CT dose reduction was  achieved through use of a standardized protocol tailored for this examination  and automatic exposure control for dose modulation. Adaptive statistical  iterative reconstruction (ASIR) was utilized. FINDINGS:    Delayed contrast-enhanced head CT:    The ventricles are midline without hydrocephalus. There is no acute intra or  extra-axial hemorrhage. The basal cisterns are clear. The paranasal sinuses are  clear. CTA NECK:    Great vessels: Normal arch anatomy with the origins patent. Right subclavian artery: Patent    Left subclavian artery: Patent     Right common carotid artery: Patent     Left common carotid artery: Patent     Cervical right internal carotid artery: Patent with no significant stenosis by  NASCET criteria. Cervical left internal carotid artery: Mixed plaque at the carotid bifurcation  with less than 50% stenosis by NASCET criteria. Right vertebral artery: Patent    Left vertebral artery: Patent    The lung apices are clear. The thyroid is homogeneous. No cervical  lymphadenopathy.     CTA HEAD:    Right cavernous internal carotid artery: Patent    Left cavernous internal carotid artery: Patent    Anterior cerebral arteries: Patent    Anterior communicating artery: Patent    Right middle cerebral artery: Patent    Left middle cerebral artery: Patent    Posterior communicating arteries: Diminutive    Posterior cerebral arteries: Patent    Basilar artery: Patent    Distal vertebral arteries: Patent    No evidence for intracranial aneurysm or hemodynamically significant stenosis. CT Perfusion:  Normal CT perfusion. Impression IMPRESSION:  No evidence of large vessel occlusion or perfusion abnormality. No hemodynamically significant carotid artery stenosis. CTA CODE NEURO HEAD AND NECK W CONT    Narrative *PRELIMINARY REPORT*    No acute occlusion    Preliminary report was provided by Dr. Juancarlos Walters, the on-call radiologist, at 2152  hours    Final report to follow. *END PRELIMINARY REPORT*  CLINICAL HISTORY: Altered mental status, left-sided weakness, seizure. EXAMINATION:  CT ANGIOGRAPHY HEAD AND NECK, CT PERFUSION    COMPARISON: None    TECHNIQUE:  Following the uneventful administration of iodinated contrast  material, axial CT angiography of the head and neck was performed. Delayed axial  images through the head were also obtained. Coronal and sagittal reconstructions  were obtained. Manual postprocessing of images was performed. 3-D  Sagittal  maximal intensity projection images were obtained. 3-D Coronal maximal  intensity projections were obtained. CT brain perfusion was performed with  generation of hemodynamic maps of multiple parameters, including cerebral blood  flow, cerebral blood volume, mean transit time, and TMAX. CT dose reduction was  achieved through use of a standardized protocol tailored for this examination  and automatic exposure control for dose modulation. Adaptive statistical  iterative reconstruction (ASIR) was utilized. FINDINGS:    Delayed contrast-enhanced head CT:    The ventricles are midline without hydrocephalus. There is no acute intra or  extra-axial hemorrhage. The basal cisterns are clear. The paranasal sinuses are  clear. CTA NECK:    Great vessels: Normal arch anatomy with the origins patent.     Right subclavian artery: Patent    Left subclavian artery: Patent     Right common carotid artery: Patent     Left common carotid artery: Patent     Cervical right internal carotid artery: Patent with no significant stenosis by  NASCET criteria. Cervical left internal carotid artery: Mixed plaque at the carotid bifurcation  with less than 50% stenosis by NASCET criteria. Right vertebral artery: Patent    Left vertebral artery: Patent    The lung apices are clear. The thyroid is homogeneous. No cervical  lymphadenopathy. CTA HEAD:    Right cavernous internal carotid artery: Patent    Left cavernous internal carotid artery: Patent    Anterior cerebral arteries: Patent    Anterior communicating artery: Patent    Right middle cerebral artery: Patent    Left middle cerebral artery: Patent    Posterior communicating arteries: Diminutive    Posterior cerebral arteries: Patent    Basilar artery: Patent    Distal vertebral arteries: Patent    No evidence for intracranial aneurysm or hemodynamically significant stenosis. CT Perfusion:  Normal CT perfusion. Impression IMPRESSION:  No evidence of large vessel occlusion or perfusion abnormality. No hemodynamically significant carotid artery stenosis. CT CODE NEURO HEAD WO CONTRAST    Narrative EXAM: CT CODE NEURO HEAD WO CONTRAST    INDICATION: seizure    COMPARISON: None. CONTRAST: None. TECHNIQUE: Unenhanced CT of the head was performed using 5 mm images. Brain and  bone windows were generated. CT dose reduction was achieved through use of a  standardized protocol tailored for this examination and automatic exposure  control for dose modulation. Note: Motion was present on the study. FINDINGS:  The ventricles and sulci are normal in size, shape and configuration and  midline. There is no significant white matter disease. There is no intracranial  hemorrhage, extra-axial collection, mass, mass effect or midline shift. The  basilar cisterns are open. No acute infarct is identified. The bone windows  demonstrate no abnormalities.  The visualized portions of the paranasal sinuses  and mastoid air cells are clear. There is an empty sella. Streak artifact is  present. Impression IMPRESSION: No acute intracranial process identified     Assessment:     Hospital Problems  Never Reviewed          Codes Class Noted POA    Seizure ([facility] [address] ICD-10-CM: R56.9  ICD-9-CM: 780.39  9/5/2019 Unknown        Altered mental status ICD-10-CM: R41.82  ICD-9-CM: 780.97  9/5/2019 Unknown        Elevated serum creatinine ICD-10-CM: R79.89  ICD-9-CM: 790.99  9/5/2019 Unknown        Left-sided weakness ICD-10-CM: R53.1  ICD-9-CM: 728.87  9/5/2019 Unknown        Uncontrolled hypertension ICD-10-CM: I10  ICD-9-CM: 401.9  9/5/2019 Unknown            Plan:      Acute metabolic encephalopathy -- Seizure vs CVA               - s/p tPA in ED on 9/5/19              - MRI obtained last night (24 hours post-tPA admin) negative for hemorrhage or infarction              - Cont neuro checks   - Cont  mg GA for thromboembolic stroke prevention   - Limited neuro exam due to intubation/sedation for respiratory failure              - PT/OT/SLP evals when appropriate              - Abnormal EEG yesterday due to diffuse beta activity   - Will defer anticonvulsants to Dr. Stephan Zarate, is currently receiving propofol gtt for sedation     I have discussed the diagnosis and the intended plan as seen in the above orders with Dr. Stephan Zarate and the primary RN. Thank you for this consult and participating in the care of this patient.   Signed By: Melany Ogden NP     September 7, 2019

## 2019-09-07 NOTE — PROGRESS NOTES
TRANSFER - IN REPORT:  Verbal report received from Brittney(name) on Katia Jordan  being received from ED(unit) for routine progression of care    Report consisted of patients Situation, Background, Assessment and   Recommendations(SBAR). Information from the following report(s) SBAR, Kardex, ED Summary, Intake/Output, MAR, Accordion, Recent Results, Med Rec Status, Cardiac Rhythm sinus elda and Alarm Parameters  was reviewed with the receiving nurse. Opportunity for questions and clarification was provided. Assessment completed upon patients arrival to unit and care assumed. 9908: Pt arrived to unit intubated on 50% fio2 and sedated on propofol and precedex, pupils sluggish 2s, no command following, spontaneous movement x4, weak cough, afebrile. Pt in sinus elda with SBP in 110s-130s, doppler pedal pulses, 2+ edema. Neuro NP at bedside, OK to placed already ordered gallego as pt is 24hrs post-tPA, immediate 600mL output, yellow, will continue monitoring  0653: Hospitalist paged, pt HR in 40s upon arrival, precedex titrated to little effect, this RN will continue monitoring and continue to titrate down if needed and administer ordered fentanyl for pain/sedation as necessary    Bedside and Verbal shift change report given to Maxime Ellis (oncoming nurse) by Amos Lennon (offgoing nurse). Report included the following information SBAR, Kardex, Intake/Output, MAR, Accordion, Recent Results, Med Rec Status, Cardiac Rhythm sinus elda and Alarm Parameters .

## 2019-09-07 NOTE — PROGRESS NOTES
Physical therapy:    Chart reviewed. Noted pt remains sedated and intubated and becomes agitated when sedation weaned. Pt is not appropriate for therapy as this time. Will defer and continue to follow. Thank you      Chart reviewed, patient received sedated and on vent. Per ABCDE protocol, will work with patient when PEEP is 10.0 or less, FIO2 60% or less, and patient is following basic commands. Will follow patient peripherally. Recommend nursing to complete with patient, as able, in order to promote cardiopulmonary systems, maintain strength, endurance and independence:   -bed in chair position with foot board on 3x/day 30-60 mins max each  -passive ROM B UEs and LEs during bathing to prevent contractures  -positioning to prevent edema and contractures. Thank you for your assistance.        Jayda Willams, PT, DPT
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
## 2019-09-07 NOTE — ED NOTES
2000 Bedside and Verbal shift change report given to Mayte William RN (oncoming nurse) by Daniel Webber RN (offgoing nurse). Report included the following information SBAR, ED Summary, Intake/Output, MAR, Recent Results and Cardiac Rhythm NSR. Pt resting on hospital bed, intubated with propofol running at max rate. Pt's eyes opening spontaneously, tapping hands and feet. Primary nurse to contact hospitalist for sedation orders after PRN fentanyl administration. 2015 Contacted Lamin Paige NP via tiger text for new sedation orders. 2026 Started precedex at initial titration rate, will titrate appropriately for sedation. 2045 Pt to CT on cardiac monitor with primary nurse, RT, and transport. Extension tubing attached to all lines. 2120 MRI delayed a few minutes to extend tubing more to reach into MRI room. 2130 Titrated precedex up to 0.5 mcg/kg/hr, pt restless. 2136 Pt on MRI table, /70, HR 73, SpO2 96%. 2150 /75, HR 72, SpO2 97%. 2157 MRI complete, will transfer pt back onto hospital bed and transport back to ED 11.    2215 Pt returned to ED 11 on cardiac monitor with primary nurse, RT, and transport. 2300 Pt slightly bradycardiac sinus rhythm, all other VSS. Pt remains sedated and restrained. Pt now moderately sedated, withdraws from pain. Will continue to monitor and assess. 0000 Pt slightly bradycardiac sinus rhythm, all other VSS. Pt remains sedated and restrained. Pt moderately sedated, withdraws from pain. Will continue to monitor and assess. 0100 Pt slightly bradycardiac sinus rhythm, all other VSS. Pt remains sedated and restrained. Pt moderately sedated, withdraws from pain. Will continue to monitor and assess. 0200 Pt slightly bradycardiac sinus rhythm, all other VSS. Pt remains sedated and restrained. Pt moderately sedated, withdraws from pain. Will continue to monitor and assess. 0300 Pt slightly bradycardiac sinus rhythm, all other VSS.  Pt remains sedated and restrained. Pt moderately sedated, withdraws from pain. More difficult to rouse with noxious stimuli, will titrate precedex down. Will continue to monitor and assess. 0400 Pt slightly bradycardiac sinus rhythm, all other VSS. Pt remains sedated and restrained. Pt moderately sedated, withdraws from pain. Will continue to monitor and assess. 0500 Pt bradycardiac sinus rhythm, all other VSS. Pt remains sedated and restrained. Pt moderately sedated, withdraws from pain. Will continue to monitor and assess.

## 2019-09-07 NOTE — ROUTINE PROCESS
TRANSFER - OUT REPORT:    Verbal report given to Jarad Purcell RN(name) on Polina Castaneda  being transferred to 7S ICU 7107-01(unit) for routine progression of care       Report consisted of patients Situation, Background, Assessment and   Recommendations(SBAR). Information from the following report(s) SBAR, ED Summary, Intake/Output, MAR, Recent Results and Cardiac Rhythm sinus elda was reviewed with the receiving nurse. Lines:   Peripheral IV 09/05/19 Right Hand (Active)       Peripheral IV 09/06/19 Left Hand (Active)   Site Assessment Clean, dry, & intact 9/6/2019 11:17 AM   Phlebitis Assessment 0 9/6/2019 11:17 AM   Infiltration Assessment 0 9/6/2019 11:17 AM   Dressing Status Clean, dry, & intact 9/6/2019 11:17 AM        Opportunity for questions and clarification was provided.       Patient transported with:   Monitor  Registered Nurse  RT  transport
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
## 2019-09-08 ENCOUNTER — APPOINTMENT (OUTPATIENT)
Dept: GENERAL RADIOLOGY | Age: 59
DRG: 061 | End: 2019-09-08
Attending: HOSPITALIST
Payer: COMMERCIAL

## 2019-09-08 LAB
ANION GAP SERPL CALC-SCNC: 6 MMOL/L (ref 5–15)
BUN SERPL-MCNC: 11 MG/DL (ref 6–20)
BUN/CREAT SERPL: 13 (ref 12–20)
CALCIUM SERPL-MCNC: 8.2 MG/DL (ref 8.5–10.1)
CHLORIDE SERPL-SCNC: 112 MMOL/L (ref 97–108)
CO2 SERPL-SCNC: 28 MMOL/L (ref 21–32)
CREAT SERPL-MCNC: 0.85 MG/DL (ref 0.55–1.02)
ERYTHROCYTE [DISTWIDTH] IN BLOOD BY AUTOMATED COUNT: 12.9 % (ref 11.5–14.5)
GLUCOSE BLD STRIP.AUTO-MCNC: 115 MG/DL (ref 65–100)
GLUCOSE BLD STRIP.AUTO-MCNC: 128 MG/DL (ref 65–100)
GLUCOSE BLD STRIP.AUTO-MCNC: 137 MG/DL (ref 65–100)
GLUCOSE SERPL-MCNC: 135 MG/DL (ref 65–100)
HCT VFR BLD AUTO: 31.9 % (ref 35–47)
HGB BLD-MCNC: 10 G/DL (ref 11.5–16)
MCH RBC QN AUTO: 31.4 PG (ref 26–34)
MCHC RBC AUTO-ENTMCNC: 31.3 G/DL (ref 30–36.5)
MCV RBC AUTO: 100.3 FL (ref 80–99)
NRBC # BLD: 0 K/UL (ref 0–0.01)
NRBC BLD-RTO: 0 PER 100 WBC
PLATELET # BLD AUTO: 207 K/UL (ref 150–400)
PMV BLD AUTO: 11.5 FL (ref 8.9–12.9)
POTASSIUM SERPL-SCNC: 4 MMOL/L (ref 3.5–5.1)
RBC # BLD AUTO: 3.18 M/UL (ref 3.8–5.2)
SERVICE CMNT-IMP: ABNORMAL
SODIUM SERPL-SCNC: 146 MMOL/L (ref 136–145)
WBC # BLD AUTO: 9.6 K/UL (ref 3.6–11)

## 2019-09-08 PROCEDURE — 80048 BASIC METABOLIC PNL TOTAL CA: CPT

## 2019-09-08 PROCEDURE — 65610000006 HC RM INTENSIVE CARE

## 2019-09-08 PROCEDURE — 74011000250 HC RX REV CODE- 250: Performed by: INTERNAL MEDICINE

## 2019-09-08 PROCEDURE — 74011250637 HC RX REV CODE- 250/637: Performed by: INTERNAL MEDICINE

## 2019-09-08 PROCEDURE — 74011250636 HC RX REV CODE- 250/636: Performed by: HOSPITALIST

## 2019-09-08 PROCEDURE — 36415 COLL VENOUS BLD VENIPUNCTURE: CPT

## 2019-09-08 PROCEDURE — 85027 COMPLETE CBC AUTOMATED: CPT

## 2019-09-08 PROCEDURE — 74011250636 HC RX REV CODE- 250/636: Performed by: FAMILY MEDICINE

## 2019-09-08 PROCEDURE — 94640 AIRWAY INHALATION TREATMENT: CPT

## 2019-09-08 PROCEDURE — 74011000258 HC RX REV CODE- 258: Performed by: INTERNAL MEDICINE

## 2019-09-08 PROCEDURE — 74011250636 HC RX REV CODE- 250/636: Performed by: INTERNAL MEDICINE

## 2019-09-08 PROCEDURE — 71045 X-RAY EXAM CHEST 1 VIEW: CPT

## 2019-09-08 PROCEDURE — 94003 VENT MGMT INPAT SUBQ DAY: CPT

## 2019-09-08 PROCEDURE — 82962 GLUCOSE BLOOD TEST: CPT

## 2019-09-08 RX ORDER — FUROSEMIDE 10 MG/ML
40 INJECTION INTRAMUSCULAR; INTRAVENOUS DAILY
Status: DISCONTINUED | OUTPATIENT
Start: 2019-09-09 | End: 2019-09-09

## 2019-09-08 RX ORDER — DEXTROSE MONOHYDRATE AND SODIUM CHLORIDE 5; .45 G/100ML; G/100ML
50 INJECTION, SOLUTION INTRAVENOUS CONTINUOUS
Status: DISCONTINUED | OUTPATIENT
Start: 2019-09-08 | End: 2019-09-10

## 2019-09-08 RX ORDER — BUMETANIDE 0.25 MG/ML
1 INJECTION INTRAMUSCULAR; INTRAVENOUS ONCE
Status: COMPLETED | OUTPATIENT
Start: 2019-09-08 | End: 2019-09-08

## 2019-09-08 RX ORDER — POTASSIUM CHLORIDE 1.5 G/1.77G
40 POWDER, FOR SOLUTION ORAL
Status: COMPLETED | OUTPATIENT
Start: 2019-09-08 | End: 2019-09-08

## 2019-09-08 RX ORDER — FUROSEMIDE 10 MG/ML
40 INJECTION INTRAMUSCULAR; INTRAVENOUS DAILY
Status: DISCONTINUED | OUTPATIENT
Start: 2019-09-08 | End: 2019-09-08

## 2019-09-08 RX ADMIN — PROPOFOL 50 MCG/KG/MIN: 10 INJECTION, EMULSION INTRAVENOUS at 01:16

## 2019-09-08 RX ADMIN — IPRATROPIUM BROMIDE AND ALBUTEROL SULFATE 3 ML: .5; 3 SOLUTION RESPIRATORY (INHALATION) at 19:57

## 2019-09-08 RX ADMIN — POTASSIUM CHLORIDE 40 MEQ: 1.5 POWDER, FOR SOLUTION ORAL at 14:29

## 2019-09-08 RX ADMIN — BUMETANIDE 1 MG: 0.25 INJECTION INTRAMUSCULAR; INTRAVENOUS at 08:35

## 2019-09-08 RX ADMIN — ASPIRIN 300 MG: 300 SUPPOSITORY RECTAL at 08:36

## 2019-09-08 RX ADMIN — FENTANYL CITRATE 50 MCG: 50 INJECTION, SOLUTION INTRAMUSCULAR; INTRAVENOUS at 02:53

## 2019-09-08 RX ADMIN — Medication 10 ML: at 22:00

## 2019-09-08 RX ADMIN — IPRATROPIUM BROMIDE AND ALBUTEROL SULFATE 3 ML: .5; 3 SOLUTION RESPIRATORY (INHALATION) at 07:52

## 2019-09-08 RX ADMIN — PROPOFOL 50 MCG/KG/MIN: 10 INJECTION, EMULSION INTRAVENOUS at 16:30

## 2019-09-08 RX ADMIN — CHLORHEXIDINE GLUCONATE 15 ML: 1.2 RINSE ORAL at 08:36

## 2019-09-08 RX ADMIN — IPRATROPIUM BROMIDE AND ALBUTEROL SULFATE 3 ML: .5; 3 SOLUTION RESPIRATORY (INHALATION) at 14:33

## 2019-09-08 RX ADMIN — PROPOFOL 50 MCG/KG/MIN: 10 INJECTION, EMULSION INTRAVENOUS at 14:25

## 2019-09-08 RX ADMIN — PROPOFOL 50 MCG/KG/MIN: 10 INJECTION, EMULSION INTRAVENOUS at 05:12

## 2019-09-08 RX ADMIN — PROPOFOL 50 MCG/KG/MIN: 10 INJECTION, EMULSION INTRAVENOUS at 07:07

## 2019-09-08 RX ADMIN — FAMOTIDINE 20 MG: 10 INJECTION, SOLUTION INTRAVENOUS at 08:35

## 2019-09-08 RX ADMIN — SODIUM CHLORIDE AND POTASSIUM CHLORIDE: 4.5; 1.49 INJECTION, SOLUTION INTRAVENOUS at 03:13

## 2019-09-08 RX ADMIN — Medication 10 ML: at 07:07

## 2019-09-08 RX ADMIN — CHLORHEXIDINE GLUCONATE 15 ML: 1.2 RINSE ORAL at 17:26

## 2019-09-08 RX ADMIN — PROPOFOL 50 MCG/KG/MIN: 10 INJECTION, EMULSION INTRAVENOUS at 19:04

## 2019-09-08 RX ADMIN — PROPOFOL 35 MCG/KG/MIN: 10 INJECTION, EMULSION INTRAVENOUS at 11:48

## 2019-09-08 RX ADMIN — Medication 10 ML: at 02:52

## 2019-09-08 RX ADMIN — DEXTROSE MONOHYDRATE AND SODIUM CHLORIDE 50 ML/HR: 5; .45 INJECTION, SOLUTION INTRAVENOUS at 08:38

## 2019-09-08 RX ADMIN — POTASSIUM CHLORIDE 40 MEQ: 1.5 POWDER, FOR SOLUTION ORAL at 08:35

## 2019-09-08 RX ADMIN — IPRATROPIUM BROMIDE AND ALBUTEROL SULFATE 3 ML: .5; 3 SOLUTION RESPIRATORY (INHALATION) at 02:22

## 2019-09-08 RX ADMIN — Medication 25 MCG/HR: at 10:52

## 2019-09-08 RX ADMIN — FENTANYL CITRATE 50 MCG: 50 INJECTION, SOLUTION INTRAMUSCULAR; INTRAVENOUS at 09:38

## 2019-09-08 RX ADMIN — PROPOFOL 50 MCG/KG/MIN: 10 INJECTION, EMULSION INTRAVENOUS at 21:27

## 2019-09-08 NOTE — PROGRESS NOTES
[facility]  INTENSIVIST DAILY PROGRESS NOTE  Name: Rubina Gonsales   : 1960   MRN: [id number]   Date: 2019 7:50 AM   I have reviewed the flowsheet and previous days notes. The patient remains intubated and sedated. More bradycardic on higher dose precedex. Agitated when propofol weaned. Apnea with PSV trial this morning. Nursing staff reports multiple prior admissions for resp failure. No seizure activity noted. Off antiepileptics. Neurology following. Vital Signs:    Visit Vitals  /63   Pulse (!) 53   Temp 98.7 °F (37.1 °C)   Resp 16   Ht 5' 3\" (1.6 m)   Wt 150 kg (330 lb 11 oz)   SpO2 98%   BMI 58.58 kg/m²       O2 Device: Endotracheal tube, Ventilator   O2 Flow Rate (L/min): 5 l/min   Temp (24hrs), Av.4 °F (36.9 °C), Min:98 °F (36.7 °C), Max:98.7 °F (37.1 °C)       Intake/Output:   Last shift:      No intake/output data recorded. Last 3 shifts:  1901 -  0700  In: 6879.9 [I.V.:6619.9]  Out: 5425 [Urine:4725]    Intake/Output Summary (Last 24 hours) at 2019 0750  Last data filed at 2019 0700  Gross per 24 hour   Intake 3080.42 ml   Output 2175 ml   Net 905.42 ml       Ventilator Settings:  Ventilator Mode: Assist control, Volume control  Respiratory Rate  Back-Up Rate: 16  Insp Flow (l/min): 50 l/min  Ventilator Volumes  Vt Set (ml): 450 ml  Vt Exhaled (Machine Breath) (ml): 475 ml  Ve Observed (l/min): 7.61 l/min  Ventilator Pressures  PIP Observed (cm H2O): 38 cm H2O  Plateau Pressure (cm H2O): 20 cm H2O  MAP (cm H2O): 12  PEEP/VENT (cm H2O): 5 cm H20    Physical Exam:  General:  Sedated, no distress   Head:  Normocephalic   Eyes:  xanthelasma   Nose: Nares normal.    Throat: ETT   Neck: thick       Lungs:   Clear to auscultation bilaterally. No wheeze today. Chest wall:  No tenderness or deformity. Heart:  Slight bradycardia   Abdomen:   Soft, obese   Extremities: Chronic stasis changes and edema.        Skin: dry       Neurologic: sedated       DATA:   Current Facility-Administered Medications   Medication Dose Route Frequency    0.45% sodium chloride with KCl 20 mEq/L infusion   IntraVENous CONTINUOUS    sodium chloride (NS) flush 5-40 mL  5-40 mL IntraVENous Q8H    sodium chloride (NS) flush 5-40 mL  5-40 mL IntraVENous PRN    bisacodyl (DULCOLAX) suppository 10 mg  10 mg Rectal DAILY PRN    propofol (DIPRIVAN) infusion  0-50 mcg/kg/min IntraVENous TITRATE    fentaNYL citrate (PF) injection 50 mcg  50 mcg IntraVENous Q2H PRN    LORazepam (ATIVAN) injection 2 mg  2 mg IntraVENous Q2H PRN    famotidine (PF) (PEPCID) 20 mg in sodium chloride 0.9% 10 mL injection  20 mg IntraVENous Q12H    chlorhexidine (PERIDEX) 0.12 % mouthwash 15 mL  15 mL Oral BID    albuterol-ipratropium (DUO-NEB) 2.5 MG-0.5 MG/3 ML  3 mL Nebulization Q6H RT    aspirin (ASA) suppository 300 mg  300 mg Rectal DAILY    dexmedeTOMidine (PRECEDEX) 400 mcg in 0.9% sodium chloride 100 mL infusion  0.2-0.7 mcg/kg/hr IntraVENous TITRATE    niCARdipine (CARDENE) 25 mg in 0.9% sodium chloride 250 mL infusion  5-15 mg/hr IntraVENous TITRATE    glucose chewable tablet 16 g  4 Tab Oral PRN    glucagon (GLUCAGEN) injection 1 mg  1 mg IntraMUSCular PRN    insulin lispro (HUMALOG) injection   SubCUTAneous Q6H    dextrose 10 % infusion 125-250 mL  125-250 mL IntraVENous PRN       Telemetry:          Labs:  Recent Labs     09/07/19  0447 09/05/19 2104   WBC 8.8 16.7*   HGB 11.2* 14.4   HCT 34.4* 45.5    266     Recent Labs     09/07/19  0435 09/05/19  2104   * 140   K 3.2* 3.7   * 101   CO2 27 32   * 233*   BUN 11 20   CREA 0.77 1.13*   CA 7.8* 9.3   MG 2.0  --    PHOS 2.7  --    ALB 2.8* 4.4   SGOT 10* 14*   ALT 15 25   INR  --  1.0     No results for input(s): PH, PCO2, PO2, HCO3, FIO2 in the last 72 hours. Imaging:  I have personally reviewed the patients radiographs and reports. Left base atx, ? Edema.   Overall no change. IMPRESSION:   · Acute on chronic hypercapneic resp failure (OHS and by history has COPD)  · Sz/AMS  · Initial concern for CVA, s/p TPA  · Obesity   PLAN:   · Try to wean propofol and allow to be more awake. Can then retry PSV. · Suggest bilevel NIV after extubation with sleep  · Add fentanyl drip if needed for comfort  · Bumex x 1  · Replete potassium  · Continue nebs   GLOBAL ISSUES:   · Head of bed elevated  · GI Prophylaxis: H2 blocker  · DVT Prophylaxis: SCDs     The pt is critically ill. See my orders for details. My assessment/plan was discussed with: nurse    Total critical care time exclusive of procedures 30 minutes.     Tory Ziegler MD

## 2019-09-08 NOTE — PROGRESS NOTES
Per chart review, patient remains intubated and sedated and not yet appropriate for PT assessment or intervention. We will continue to follow and progress as appropriate.     Sharon Diaz, PT

## 2019-09-08 NOTE — PROGRESS NOTES
Neurology Progress Note  [facility]  Neurocritical Care NP  [phone]    Admit Date: 9/5/2019   LOS: 3 days        Daily Progress Note: 9/8/2019    HPI:  Roddy Edwards is a 64 y.o. female with a PMH of COPD, morbid obesity, sleep apnea, and HTN who presented to the ED via EMS on 9/5/19 due to altered mental status. She was at a [address] filling her prescriptions and it was reported that the patient started feeling funny, so EMS was called. Upon EMS arrival, it was reported that the the patient had two generalized seizures. She was noted to be hypertensive with SBP in the 200s. EMS gave 5 mg versed en route to ED. On arrival, she was noted with left sided weakness, aphasia and right gaze preference. A Code S was called and stat CT of her head was performed which showed no acute intracranial abnormality. CTA of her head and neck was then performed which was negative for LVO, and CTP showed no perfusion abnormalities. Her BP was treated with labetalol/Cardene and she was within the tPA window, so it was administered. Of note, she was hypoxic in the ED and blood gases showed hypercapnia, so she was intubated to correct her uncompensated primary respiratory acidosis/ hypercapnia. She was given 2,000 mg of IV Keppra in the ED. An EEG was performed on 9/6 which was abnormal due to diffuse beta activity. MRI of the Brain was obtained on 9/6 which showed no acute intracranial abnormality. Subjective:   She remains intubated and sedated on propofol and precedex. She gets slightly agitated and restless on the ventilator and did not pass her SAT this morning. She is following some commands on the ventilator. Unable to obtain a ROS due to intubation and sedation.      Current Facility-Administered Medications   Medication Dose Route Frequency Provider Last Rate Last Dose    dextrose 5 % - 0.45% NaCl infusion  50 mL/hr IntraVENous CONTINUOUS Bravo Gallo MD 50 mL/hr at 09/08/19 0822 50 mL/hr at 09/08/19 0838    fentaNYL (PF) 1,500 mcg/30 mL (50 mcg/mL) infusion  0-200 mcg/hr IntraVENous TITRATE Yris Harper MD 0.8 mL/hr at 09/08/19 1534 40 mcg/hr at 09/08/19 1534    [START ON 9/9/2019] famotidine (PF) (PEPCID) 20 mg in sodium chloride 0.9% 10 mL injection  20 mg IntraVENous DAILY Phil Rasmussen MD        [START ON 9/9/2019] furosemide (LASIX) injection 40 mg  40 mg IntraVENous DAILY Phil Rasmussen MD        sodium chloride (NS) flush 5-40 mL  5-40 mL IntraVENous Q8H Makenzie Abel MD   10 mL at 09/08/19 0707    sodium chloride (NS) flush 5-40 mL  5-40 mL IntraVENous PRN Makenzie Abel MD        bisacodyl (DULCOLAX) suppository 10 mg  10 mg Rectal DAILY PRN Makenzie Abel MD        propofol (DIPRIVAN) infusion  0-50 mcg/kg/min IntraVENous TITRATE Makenzie Abel MD 44.4 mL/hr at 09/08/19 1425 50 mcg/kg/min at 09/08/19 1425    fentaNYL citrate (PF) injection 50 mcg  50 mcg IntraVENous Q2H PRN Bhaskar Painting MD   50 mcg at 09/08/19 0938    LORazepam (ATIVAN) injection 2 mg  2 mg IntraVENous Q2H PRN Bhaskar Painting MD   2 mg at 09/06/19 1048    chlorhexidine (PERIDEX) 0.12 % mouthwash 15 mL  15 mL Oral BID Bhaskar Painting MD   15 mL at 09/08/19 0836    albuterol-ipratropium (DUO-NEB) 2.5 MG-0.5 MG/3 ML  3 mL Nebulization Q6H RT Carlo Dexter MD   3 mL at 09/08/19 1433    aspirin (ASA) suppository 300 mg  300 mg Rectal DAILY Carlo Dexter MD   300 mg at 09/08/19 0836    dexmedeTOMidine (PRECEDEX) 400 mcg in 0.9% sodium chloride 100 mL infusion  0.2-0.7 mcg/kg/hr IntraVENous TITRATE Deanna Schmitt NP   Stopped at 09/08/19 1424    niCARdipine (CARDENE) 25 mg in 0.9% sodium chloride 250 mL infusion  5-15 mg/hr IntraVENous TITRATE Trudi Blackman MD   Stopped at 09/05/19 2250    glucose chewable tablet 16 g  4 Tab Oral PRN Makenzie Abel MD        glucagon (GLUCAGEN) injection 1 mg  1 mg IntraMUSCular PRN Makenzie Abel MD        insulin lispro (HUMALOG) injection   SubCUTAneous Q6H Zoë Daly MD   Stopped at 19 0515    dextrose 10 % infusion 125-250 mL  125-250 mL IntraVENous PRN Sean Dash MD            Allergies   Allergen Reactions    Pcn [Penicillins] Unknown (comments)       Review of Systems:  Review of systems not obtained due to patient factors. Objective:     Vital signs  Temp (24hrs), Av.6 °F (37 °C), Min:98.4 °F (36.9 °C), Max:99 °F (37.2 °C)   701 -  190  In: 742.5 [I.V.:742.5]  Out: 950 [Urine:950]  1901 -  0700  In: 6879.9 [I.V.:6619.9]  Out: 7994 [Urine:4725]    Visit Vitals  /69   Pulse (!) 47   Temp 99 °F (37.2 °C)   Resp 16   Ht 5' 3\" (1.6 m)   Wt 330 lb 11 oz (150 kg)   SpO2 98%   BMI 58.58 kg/m²    O2 Flow Rate (L/min): 5 l/min O2 Device: Endotracheal tube     Pain control  Pain Assessment  Pain Scale 1: Adult Nonverbal Pain Scale  Pain Intensity 1: 0  Pain Intervention(s) 1: Medication (see MAR)    PT/OT  Gait                   Vitals:    19 1216 19 1400 19 1433 19 1530   BP:  119/58  136/69   Pulse: (!) 59 (!) 50  (!) 47   Resp: 16 16  16   Temp:       SpO2: 99% 96% 96% 98%   Weight:       Height:            Physical Exam:  GENERAL: intubated and sedated on propofol and precedex   EYE: conjunctivae/corneas clear. PERRL. No blink to threat. LUNG: clear to auscultation bilaterally  HEART: regular rate and rhythm, sinus bradycardia, S1, S2 normal, no murmur, click, rub or gallop    Neurologic Exam:  Mental Status:  Intubated and sedated on propofol and precedex       Language:    Unable to assess due to intubation. Cranial Nerves:        Pupils equal, round and reactive to light. No blink to threat. Motor: Follows commands. Moves all extremities spontaneously      No involuntary movements. Sensation:    Unable to assess. Coordination & Gait: Deferred.       24 hour results:    Recent Results (from the past 24 hour(s))   GLUCOSE, POC Collection Time: 09/07/19  6:20 PM   Result Value Ref Range    Glucose (POC) 138 (H) 65 - 100 mg/dL    Performed by Phillip Bernal    GLUCOSE, POC    Collection Time: 09/07/19 11:16 PM   Result Value Ref Range    Glucose (POC) 156 (H) 65 - 100 mg/dL    Performed by [employer]    GLUCOSE, POC    Collection Time: 09/08/19  5:14 AM   Result Value Ref Range    Glucose (POC) 128 (H) 65 - 100 mg/dL    Performed by [employer]    GLUCOSE, POC    Collection Time: 09/08/19  2:15 PM   Result Value Ref Range    Glucose (POC) 137 (H) 65 - 100 mg/dL    Performed by Phillip Bernal    CBC W/O DIFF    Collection Time: 09/08/19  2:16 PM   Result Value Ref Range    WBC 9.6 3.6 - 11.0 K/uL    RBC 3.18 (L) 3.80 - 5.20 M/uL    HGB 10.0 (L) 11.5 - 16.0 g/dL    HCT 31.9 (L) 35.0 - 47.0 %    .3 (H) 80.0 - 99.0 FL    MCH 31.4 26.0 - 34.0 PG    MCHC 31.3 30.0 - 36.5 g/dL    RDW 12.9 11.5 - 14.5 %    PLATELET 956 669 - 749 K/uL    MPV 11.5 8.9 - 12.9 FL    NRBC 0.0 0  WBC    ABSOLUTE NRBC 0.00 0.00 - 4.89 K/uL   METABOLIC PANEL, BASIC    Collection Time: 09/08/19  2:16 PM   Result Value Ref Range    Sodium 146 (H) 136 - 145 mmol/L    Potassium 4.0 3.5 - 5.1 mmol/L    Chloride 112 (H) 97 - 108 mmol/L    CO2 28 21 - 32 mmol/L    Anion gap 6 5 - 15 mmol/L    Glucose 135 (H) 65 - 100 mg/dL    BUN 11 6 - 20 MG/DL    Creatinine 0.85 0.55 - 1.02 MG/DL    BUN/Creatinine ratio 13 12 - 20      GFR est AA >60 >60 ml/min/1.73m2    GFR est non-AA >60 >60 ml/min/1.73m2    Calcium 8.2 (L) 8.5 - 10.1 MG/DL          Imaging:  CT of Head on 9/5/2019 shows  IMPRESSION  IMPRESSION: No acute intracranial process identified. CTA of Head and Neck and CT Perfusion on 9/5/2019 shows  IMPRESSION:  No evidence of large vessel occlusion or perfusion abnormality. No hemodynamically significant carotid artery stenosis. MRI of Brain on 9/6/2019 shows  IMPRESSION: No acute intracranial abnormality.        Assessment:     Active Problems:    Seizure ([address] (9/5/2019)      Altered mental status (9/5/2019)      Elevated serum creatinine (9/5/2019)      Left-sided weakness (9/5/2019)      Uncontrolled hypertension (9/5/2019)        Plan:   1. Suspected seizure   - s/p IV tPA in ED on 9/5/2019 due to stroke-like symptoms   - neuro exam is limited due to intubation and sedation, but patient is following commands on the ventilator, BLUNT's. - MRI negative for an acute stroke   - EEG on 9/6 shows diffuse beta activity and finding could be consistent with medication effect with sedation on board   - Holding off starting AEDs at this time   - may consider repeating EEG when patient is weaned off sedation and hopefully extubated   - continue  KS for stroke prevention    - PT/OT/SLP evals when able    - Neurology following       2. Acute Respiratory Insufficiency   - due to hypoxia, hypercapnia   - wean sedation as tolerated   - SAT/SBT daily   - vent management per Pulmonary   - Pulmonary following     Activity: Bed rest  DVT ppx: SCDs  Dispo: TBD    Plan d/w Dr. Pierre Ng and ICU RN.       Dewey Ahumada, NP

## 2019-09-08 NOTE — PROGRESS NOTES
Spiritual Care Assessment/Progress Note  [facility]      NAME: Cheo Webber      MRN: [id number]  AGE: 64 y.o. SEX: female  Catholic Affiliation: Mormon   Language: English     9/8/2019     Total Time (in minutes): 5     Spiritual Assessment begun in [address] [facility] through conversation with:         []Patient        [] Family    [] Friend(s)        Reason for Consult: Initial/Spiritual assessment, critical care     Spiritual beliefs: (Please include comment if needed)     [] Identifies with a elia tradition:         [] Supported by a elia community:            [] Claims no spiritual orientation:           [] Seeking spiritual identity:                [] Adheres to an individual form of spirituality:           [x] Not able to assess:                           Identified resources for coping:      [] Prayer                               [] Music                  [] Guided Imagery     [] Family/friends                 [] Pet visits     [] Devotional reading                         [] Unknown     [] Other:                                          Interventions offered during this visit: (See comments for more details)    Patient Interventions: Initial visit           Plan of Care:     [] Support spiritual and/or cultural needs    [] Support AMD and/or advance care planning process      [] Support grieving process   [] Coordinate Rites and/or Rituals    [] Coordination with community clergy   [] No spiritual needs identified at this time   [] Detailed Plan of Care below (See Comments)  [] Make referral to Music Therapy  [] Make referral to Pet Therapy     [] Make referral to Addiction services  [] Make referral to [facility]  [] Make referral to Spiritual Care Partner  [] No future visits requested        [x] Follow up visits as needed     Attempted to visit pt on ICU. Pt intubated with no family present. Chaplains will continue to offer support as needed.   Chaplain Yolanda, MDiv, MS, BCC  [phone]
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
## 2019-09-09 LAB
ALBUMIN SERPL-MCNC: 2.9 G/DL (ref 3.5–5)
ALBUMIN SERPL-MCNC: 3.4 G/DL (ref 3.5–5)
ALBUMIN/GLOB SERPL: 0.9 {RATIO} (ref 1.1–2.2)
ALBUMIN/GLOB SERPL: 0.9 {RATIO} (ref 1.1–2.2)
ALP SERPL-CCNC: 50 U/L (ref 45–117)
ALP SERPL-CCNC: 58 U/L (ref 45–117)
ALT SERPL-CCNC: 22 U/L (ref 12–78)
ALT SERPL-CCNC: 31 U/L (ref 12–78)
ANION GAP SERPL CALC-SCNC: 6 MMOL/L (ref 5–15)
ANION GAP SERPL CALC-SCNC: 7 MMOL/L (ref 5–15)
AST SERPL-CCNC: 18 U/L (ref 15–37)
AST SERPL-CCNC: 30 U/L (ref 15–37)
BASOPHILS # BLD: 0.1 K/UL (ref 0–0.1)
BASOPHILS NFR BLD: 1 % (ref 0–1)
BILIRUB SERPL-MCNC: 0.5 MG/DL (ref 0.2–1)
BILIRUB SERPL-MCNC: 0.8 MG/DL (ref 0.2–1)
BUN SERPL-MCNC: 10 MG/DL (ref 6–20)
BUN SERPL-MCNC: 9 MG/DL (ref 6–20)
BUN/CREAT SERPL: 10 (ref 12–20)
BUN/CREAT SERPL: 12 (ref 12–20)
CALCIUM SERPL-MCNC: 8.3 MG/DL (ref 8.5–10.1)
CALCIUM SERPL-MCNC: 8.6 MG/DL (ref 8.5–10.1)
CHLORIDE SERPL-SCNC: 108 MMOL/L (ref 97–108)
CHLORIDE SERPL-SCNC: 112 MMOL/L (ref 97–108)
CO2 SERPL-SCNC: 29 MMOL/L (ref 21–32)
CO2 SERPL-SCNC: 32 MMOL/L (ref 21–32)
CREAT SERPL-MCNC: 0.81 MG/DL (ref 0.55–1.02)
CREAT SERPL-MCNC: 0.86 MG/DL (ref 0.55–1.02)
DIFFERENTIAL METHOD BLD: ABNORMAL
EOSINOPHIL # BLD: 0.3 K/UL (ref 0–0.4)
EOSINOPHIL NFR BLD: 3 % (ref 0–7)
ERYTHROCYTE [DISTWIDTH] IN BLOOD BY AUTOMATED COUNT: 13.1 % (ref 11.5–14.5)
GLOBULIN SER CALC-MCNC: 3.1 G/DL (ref 2–4)
GLOBULIN SER CALC-MCNC: 3.6 G/DL (ref 2–4)
GLUCOSE BLD STRIP.AUTO-MCNC: 115 MG/DL (ref 65–100)
GLUCOSE BLD STRIP.AUTO-MCNC: 124 MG/DL (ref 65–100)
GLUCOSE BLD STRIP.AUTO-MCNC: 132 MG/DL (ref 65–100)
GLUCOSE BLD STRIP.AUTO-MCNC: 157 MG/DL (ref 65–100)
GLUCOSE SERPL-MCNC: 120 MG/DL (ref 65–100)
GLUCOSE SERPL-MCNC: 139 MG/DL (ref 65–100)
HCT VFR BLD AUTO: 35 % (ref 35–47)
HGB BLD-MCNC: 10.9 G/DL (ref 11.5–16)
IMM GRANULOCYTES # BLD AUTO: 0 K/UL (ref 0–0.04)
IMM GRANULOCYTES NFR BLD AUTO: 0 % (ref 0–0.5)
LYMPHOCYTES # BLD: 1.1 K/UL (ref 0.8–3.5)
LYMPHOCYTES NFR BLD: 12 % (ref 12–49)
MCH RBC QN AUTO: 30.9 PG (ref 26–34)
MCHC RBC AUTO-ENTMCNC: 31.1 G/DL (ref 30–36.5)
MCV RBC AUTO: 99.2 FL (ref 80–99)
MONOCYTES # BLD: 0.9 K/UL (ref 0–1)
MONOCYTES NFR BLD: 10 % (ref 5–13)
NEUTS SEG # BLD: 7 K/UL (ref 1.8–8)
NEUTS SEG NFR BLD: 74 % (ref 32–75)
NRBC # BLD: 0 K/UL (ref 0–0.01)
NRBC BLD-RTO: 0 PER 100 WBC
PLATELET # BLD AUTO: 191 K/UL (ref 150–400)
PMV BLD AUTO: 11.6 FL (ref 8.9–12.9)
POTASSIUM SERPL-SCNC: 3.7 MMOL/L (ref 3.5–5.1)
POTASSIUM SERPL-SCNC: 3.7 MMOL/L (ref 3.5–5.1)
PROT SERPL-MCNC: 6 G/DL (ref 6.4–8.2)
PROT SERPL-MCNC: 7 G/DL (ref 6.4–8.2)
RBC # BLD AUTO: 3.53 M/UL (ref 3.8–5.2)
SERVICE CMNT-IMP: ABNORMAL
SODIUM SERPL-SCNC: 146 MMOL/L (ref 136–145)
SODIUM SERPL-SCNC: 148 MMOL/L (ref 136–145)
WBC # BLD AUTO: 9.3 K/UL (ref 3.6–11)

## 2019-09-09 PROCEDURE — 97165 OT EVAL LOW COMPLEX 30 MIN: CPT

## 2019-09-09 PROCEDURE — 95816 EEG AWAKE AND DROWSY: CPT | Performed by: PSYCHIATRY & NEUROLOGY

## 2019-09-09 PROCEDURE — 74011250637 HC RX REV CODE- 250/637: Performed by: HOSPITALIST

## 2019-09-09 PROCEDURE — 74011250637 HC RX REV CODE- 250/637: Performed by: NURSE PRACTITIONER

## 2019-09-09 PROCEDURE — 36415 COLL VENOUS BLD VENIPUNCTURE: CPT

## 2019-09-09 PROCEDURE — 65610000006 HC RM INTENSIVE CARE

## 2019-09-09 PROCEDURE — 74011250637 HC RX REV CODE- 250/637: Performed by: INTERNAL MEDICINE

## 2019-09-09 PROCEDURE — 94660 CPAP INITIATION&MGMT: CPT

## 2019-09-09 PROCEDURE — 82962 GLUCOSE BLOOD TEST: CPT

## 2019-09-09 PROCEDURE — 80053 COMPREHEN METABOLIC PANEL: CPT

## 2019-09-09 PROCEDURE — 77030029684 HC NEB SM VOL KT MONA -A

## 2019-09-09 PROCEDURE — 74011636637 HC RX REV CODE- 636/637: Performed by: FAMILY MEDICINE

## 2019-09-09 PROCEDURE — 74011250636 HC RX REV CODE- 250/636: Performed by: HOSPITALIST

## 2019-09-09 PROCEDURE — 77030013140 HC MSK NEB VYRM -A

## 2019-09-09 PROCEDURE — 74011000250 HC RX REV CODE- 250: Performed by: HOSPITALIST

## 2019-09-09 PROCEDURE — 97535 SELF CARE MNGMENT TRAINING: CPT

## 2019-09-09 PROCEDURE — 85025 COMPLETE CBC W/AUTO DIFF WBC: CPT

## 2019-09-09 PROCEDURE — 74011000250 HC RX REV CODE- 250: Performed by: INTERNAL MEDICINE

## 2019-09-09 PROCEDURE — 5A09357 ASSISTANCE WITH RESPIRATORY VENTILATION, LESS THAN 24 CONSECUTIVE HOURS, CONTINUOUS POSITIVE AIRWAY PRESSURE: ICD-10-PCS | Performed by: HOSPITALIST

## 2019-09-09 PROCEDURE — 94640 AIRWAY INHALATION TREATMENT: CPT

## 2019-09-09 RX ORDER — FUROSEMIDE 10 MG/ML
40 INJECTION INTRAMUSCULAR; INTRAVENOUS DAILY
Status: DISCONTINUED | OUTPATIENT
Start: 2019-09-09 | End: 2019-09-13 | Stop reason: HOSPADM

## 2019-09-09 RX ORDER — GUAIFENESIN 100 MG/5ML
81 LIQUID (ML) ORAL DAILY
Status: DISCONTINUED | OUTPATIENT
Start: 2019-09-10 | End: 2019-09-13 | Stop reason: HOSPADM

## 2019-09-09 RX ORDER — TRAZODONE HYDROCHLORIDE 50 MG/1
50 TABLET ORAL ONCE
Status: COMPLETED | OUTPATIENT
Start: 2019-09-10 | End: 2019-09-09

## 2019-09-09 RX ORDER — FENTANYL CITRATE 50 UG/ML
25 INJECTION, SOLUTION INTRAMUSCULAR; INTRAVENOUS
Status: DISCONTINUED | OUTPATIENT
Start: 2019-09-09 | End: 2019-09-13 | Stop reason: HOSPADM

## 2019-09-09 RX ORDER — BUDESONIDE 0.5 MG/2ML
500 INHALANT ORAL
Status: DISCONTINUED | OUTPATIENT
Start: 2019-09-09 | End: 2019-09-13 | Stop reason: HOSPADM

## 2019-09-09 RX ORDER — BUMETANIDE 0.25 MG/ML
1 INJECTION INTRAMUSCULAR; INTRAVENOUS ONCE
Status: ACTIVE | OUTPATIENT
Start: 2019-09-09 | End: 2019-09-09

## 2019-09-09 RX ORDER — LOSARTAN POTASSIUM 25 MG/1
25 TABLET ORAL DAILY
Status: DISCONTINUED | OUTPATIENT
Start: 2019-09-09 | End: 2019-09-10

## 2019-09-09 RX ORDER — ARFORMOTEROL TARTRATE 15 UG/2ML
15 SOLUTION RESPIRATORY (INHALATION)
Status: DISCONTINUED | OUTPATIENT
Start: 2019-09-09 | End: 2019-09-13 | Stop reason: HOSPADM

## 2019-09-09 RX ADMIN — BUDESONIDE 500 MCG: 0.5 INHALANT RESPIRATORY (INHALATION) at 19:46

## 2019-09-09 RX ADMIN — FUROSEMIDE 40 MG: 10 INJECTION, SOLUTION INTRAMUSCULAR; INTRAVENOUS at 08:01

## 2019-09-09 RX ADMIN — Medication 10 ML: at 22:00

## 2019-09-09 RX ADMIN — FAMOTIDINE 20 MG: 10 INJECTION, SOLUTION INTRAVENOUS at 08:01

## 2019-09-09 RX ADMIN — LOSARTAN POTASSIUM 25 MG: 25 TABLET ORAL at 12:55

## 2019-09-09 RX ADMIN — Medication 10 ML: at 06:00

## 2019-09-09 RX ADMIN — INSULIN LISPRO 2 UNITS: 100 INJECTION, SOLUTION INTRAVENOUS; SUBCUTANEOUS at 12:12

## 2019-09-09 RX ADMIN — FAMOTIDINE 20 MG: 10 INJECTION, SOLUTION INTRAVENOUS at 21:00

## 2019-09-09 RX ADMIN — IPRATROPIUM BROMIDE AND ALBUTEROL SULFATE 3 ML: .5; 3 SOLUTION RESPIRATORY (INHALATION) at 08:02

## 2019-09-09 RX ADMIN — IPRATROPIUM BROMIDE AND ALBUTEROL SULFATE 3 ML: .5; 3 SOLUTION RESPIRATORY (INHALATION) at 19:46

## 2019-09-09 RX ADMIN — IPRATROPIUM BROMIDE AND ALBUTEROL SULFATE 3 ML: .5; 3 SOLUTION RESPIRATORY (INHALATION) at 01:23

## 2019-09-09 RX ADMIN — ASPIRIN 300 MG: 300 SUPPOSITORY RECTAL at 08:02

## 2019-09-09 RX ADMIN — TRAZODONE HYDROCHLORIDE 50 MG: 50 TABLET ORAL at 23:21

## 2019-09-09 RX ADMIN — CHLORHEXIDINE GLUCONATE 15 ML: 1.2 RINSE ORAL at 08:02

## 2019-09-09 RX ADMIN — ARFORMOTEROL TARTRATE 15 MCG: 15 SOLUTION RESPIRATORY (INHALATION) at 12:08

## 2019-09-09 RX ADMIN — BUDESONIDE 500 MCG: 0.5 INHALANT RESPIRATORY (INHALATION) at 12:08

## 2019-09-09 NOTE — PROGRESS NOTES
Physical Therapy Note  09.09.19    Chart reviewed in prep for PT evaluation, RN reporting patient recently extubated, now on BIPAP until 1300. Will follow up this PM for PT eval as able/appropriate.     Thank you,  Rogelio Kennedy, PT, DPT

## 2019-09-09 NOTE — PROGRESS NOTES
Renal Dosing/Monitoring  Medication: Famotidine   Current regimen:  20 mg IV every 24 hr  Recent Labs     09/09/19  0605 09/08/19  1416 09/07/19  0435   CREA 0.81 0.85 0.77   BUN 10 11 11     Estimated CrCl:  >100 ml/min  Plan: Change to 20 mg IV every 12 hours per [facility] P&T Committee Protocol with respect to renal function. Pharmacy will continue to monitor patient daily and will make dosage adjustments based upon changing renal function.

## 2019-09-09 NOTE — PROGRESS NOTES
Reason for Admission:   Patient with new onset seizures                   RRAT Score:    0 / [place] for utilizing home health:    TBD                      Current Advanced Directive/Advance Care Plan: Not on file                         Transition of Care Plan:  TBD    Care manager met with patient and her 2 room mates Bob Rubalcava [phone] and Woodfin Felty [phone] to introduce self and explain role. Patient moved to the [facility] in March and has not obtained a PCP as of yet. Patient lives with 2 room mates in a single level home with 3 steps to enter. She uses the [facility] as her pharmacy. Per room mates patient wears Oxygen and Farhana León is the provider, she also wears a C pap at night. Confirmed her insurance to be [employer]. Care management will follow for transitions of care. Karol Parmar RN,CRM    Care Management Interventions  PCP Verified by CM: Yes(No PCP)  Palliative Care Criteria Met (RRAT>21 & CHF Dx)?: No  MyChart Signup: No  Discharge Durable Medical Equipment: No  Physical Therapy Consult: Yes  Occupational Therapy Consult: Yes  Speech Therapy Consult: Yes  Current Support Network:  Other(Darius Rubalcava [phone], Woodfin Felty [phone], Sister : Belia Wolf [phone])  Confirm Follow Up Transport: Friends

## 2019-09-09 NOTE — PROGRESS NOTES
Problem: Self Care Deficits Care Plan (Adult)  Goal: *Acute Goals and Plan of Care (Insert Text)  Description    FUNCTIONAL STATUS PRIOR TO ADMISSION: Patient was modified independent using no AD for mobility, uses a shower chair and grab bars in shower. HOME SUPPORT: The patient lived with 4 roommates but did not require assist.    Occupational Therapy Goals  Initiated 9/9/2019  1. Patient will perform grooming sitting unsupported with SBA within 7 day(s). 2.  Patient will perform upper body ADLs with SBA within  day(s). 3.  Patient will perform lower body ADLs with minimal assistance/contact guard assist within 7 day(s). 4.  Patient will perform toilet transfers with minimal assistance/contact guard assist within 7 day(s). 5.  Patient will perform all aspects of toileting with minimal assistance/contact guard assist within 7 day(s). 6.  Patient will participate in upper extremity therapeutic exercise/activities with independence for 5 minutes within 7 day(s). 7.  Patient will utilize energy conservation techniques during functional activities with verbal cues within 7 day(s). Outcome: Not Met    OCCUPATIONAL THERAPY EVALUATION  Patient: Lg Wright (31 y.o. female)  Date: 9/9/2019  Primary Diagnosis: Altered mental status [R41.82]  Seizure ([address] [R56.9]  Uncontrolled hypertension [I10]  Elevated serum creatinine [R79.89]  Left-sided weakness [R53.1]        Precautions:  Fall    ASSESSMENT  Based on the objective data described below, the patient presents with limited ADL performance 2* impaired balance, decreased functional activity tolerance, generalized weakness, impaired cardiopulmonary endurance, confusion/AMS, lability, body habitus and impaired reach to feet s/p admission for AMS, seizure and L sided weakness. Imaging negative for acute process. Evaluation limited to bed level 2* patient respiratory status.  Patient noted to wax and wane throughout session, alert and oriented x4 at beginning of session, however, forgetting location and situation throughout session. Patient intubated upon admission on 9/6 and extubated 9/9. During session, patient VSS on 5L NC - down from BIPAP post extubation. Patient reports being mostly mod I/IND at baseline and living in 1 level home with 4 roommates. Today, patient demonstrated BUE ROM, strength and coordination WFL. Patient able to assist with scooting to [facility] with BLE while therapist and RN pulled up. Patient placed in chair position with VSS. Patient became labile and reported being embarrassed that she was confused. Patient provided calming presence an reassurance. Patient able to wash face with setup and left sitting upright with call bell in reach, roommate bedside, RN aware. Will continue to follow, anticipate need for rehab    Current Level of Function Impacting Discharge (ADLs/self-care): setup-total A    Functional Outcome Measure: The patient scored 25/100 on the Barthel Index outcome measure which is indicative of severe deficits in ADL and functional mobility performance. Other factors to consider for discharge: was IND, unsure if roommates can provide supervision     Patient will benefit from skilled therapy intervention to address the above noted impairments. PLAN :  Recommendations and Planned Interventions: self care training, functional mobility training, therapeutic exercise, balance training, therapeutic activities, endurance activities, patient education, home safety training and family training/education    Frequency/Duration: Patient will be followed by occupational therapy 5 times a week to address goals.     Recommendation for discharge: (in order for the patient to meet his/her long term goals)  To be determined: pending progress, anticipate need for rehab     This discharge recommendation:  Has not yet been discussed the attending provider and/or case management    Equipment recommendations for successful discharge (if) home: to be determined by rehab facility       SUBJECTIVE:   Patient stated I can't believe how confused I am.    OBJECTIVE DATA SUMMARY:   HISTORY:   Past Medical History:   Diagnosis Date    Chronic obstructive pulmonary disease ([address]     Hypertension    No past surgical history on file. Expanded or extensive additional review of patient history:     Home Situation  Home Environment: Private residence  # Steps to Enter: 3  Rails to Enter: Yes  Hand Rails : Bilateral(too wide to hold both )  One/Two Story Residence: One story  Living Alone: No  Support Systems: Other (comments)(4 room mates)  Current DME Used/Available at Home: Shower chair, Grab bars  Tub or Shower Type: Tub/Shower combination    Hand dominance: Right    EXAMINATION OF PERFORMANCE DEFICITS:  Cognitive/Behavioral Status:  Neurologic State: Alert  Orientation Level: Oriented X4  Cognition: Decreased attention/concentration; Follows commands;Memory loss  Perception: Appears intact  Perseveration: No perseveration noted  Safety/Judgement: Awareness of environment;Decreased awareness of need for assistance;Decreased awareness of need for safety;Decreased insight into deficits; Fall prevention    Skin: Appears grossly intact    Edema: none noted in BUEs    Hearing:       Vision/Perceptual:    Tracking: Able to track stimulus in all quadrants w/o difficulty    Diplopia: No    Acuity: Impaired near vision; Impaired far vision    Corrective Lenses: Glasses(not present)    Range of Motion:  In BUEs  AROM: Generally decreased, functional    Strength: In BUEs  Strength: Generally decreased, functional    Coordination:  Coordination: Generally decreased, functional  Fine Motor Skills-Upper: Left Intact; Right Intact    Gross Motor Skills-Upper: Left Intact; Right Intact    Tone & Sensation:  In BUEs  Tone: Normal  Sensation: Intact    Balance:  Sitting: Impaired  Sitting - Static: Good (unsupported); Supported sitting    Functional Mobility and Transfers for ADLs:  Bed Mobility:  Supine to Sit: Other (comment)(bed mechanics utilized)  Scooting: Total assistance;Assist x2(to scoot to [facility])    Transfers:  NT this session    ADL Assessment:  Feeding: Minimum assistance(Infer min A for container management)    Oral Facial Hygiene/Grooming: Minimum assistance(Infer A for container management, setup to wash face)    Bathing: Maximum assistance(Infer mod A upper and max/total A lower )    Upper Body Dressing: Moderate assistance(Infer per obs of func reach, BUE ROM, strength)    Lower Body Dressing: Total assistance(Infer per obs of func reach, BUE ROM, strength, balance)    Toileting: Total assistance(Infer per obs of func reach, balance, strenth)    ADL Intervention and task modifications:    Grooming  Washing Face: Set-up    Cognitive Retraining  Safety/Judgement: Awareness of environment;Decreased awareness of need for assistance;Decreased awareness of need for safety;Decreased insight into deficits; Fall prevention    Functional Measure:  Barthel Index:    Bathin  Bladder: 5  Bowels: 10  Groomin  Dressin  Feedin  Mobility: 0  Stairs: 0  Toilet Use: 0  Transfer (Bed to Chair and Back): 0  Total: 25/100        The Barthel ADL Index: Guidelines  1. The index should be used as a record of what a patient does, not as a record of what a patient could do. 2. The main aim is to establish degree of independence from any help, physical or verbal, however minor and for whatever reason. 3. The need for supervision renders the patient not independent. 4. A patient's performance should be established using the best available evidence. Asking the patient, friends/relatives and nurses are the usual sources, but direct observation and common sense are also important. However direct testing is not needed. 5. Usually the patient's performance over the preceding 24-48 hours is important, but occasionally longer periods will be relevant.   6. Middle categories imply that the patient supplies over 50 per cent of the effort. 7. Use of aids to be independent is allowed. Sahra Flanagan., Barthel, D.W. (4304). Functional evaluation: the Barthel Index. [address] (14)2. VANDA Gray, Sheela Wagner., [place]., Dominic Palma, [address] [facility] (1999). Measuring the change indisability after inpatient rehabilitation; comparison of the responsiveness of the Barthel Index and Functional [place] Measure. Journal of Neurology, Neurosurgery, and Psychiatry, 66(4), 372-320. MARYAN Danielle.ABIMAEL, MARIA VICTORIA Munoz, & Marina Smith M.A. (2004.) Assessment of post-stroke quality of life in cost-effectiveness studies: The usefulness of the Barthel Index and the EuroQoL-5D. Quality of Life Research, 15, 531-08       Occupational Therapy Evaluation Charge Determination   History Examination Decision-Making   LOW Complexity : Brief history review  MEDIUM Complexity : 3-5 performance deficits relating to physical, cognitive , or psychosocial skils that result in activity limitations and / or participation restrictions HIGH Complexity : Patient presents with comorbidities that affect occupational performance. Signifigant modification of tasks or assistance (eg, physical or verbal) with assessment (s) is necessary to enable patient to complete evaluation       Based on the above components, the patient evaluation is determined to be of the following complexity level: MEDIUM  Pain Rating:  none    Activity Tolerance:   Fair and desaturates with exertion and requires oxygen  Please refer to the flowsheet for vital signs taken during this treatment. After treatment patient left in no apparent distress:    Sitting in chair position, Call bell within reach, Caregiver / family present, Side rails x 3 and RN aware     COMMUNICATION/EDUCATION:   The patients plan of care was discussed with: Physical Therapist and Registered Nurse.     Home safety education was provided and the patient/caregiver indicated understanding. and Patient/family have participated as able in goal setting and plan of care. This patients plan of care is appropriate for delegation to [facility].     Thank you for this referral.  Destiney Zuniga OT  Time Calculation: 31 mins
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
## 2019-09-09 NOTE — PROGRESS NOTES
Orders received, chart reviewed and patient evaluated by occupational therapy. Pending progression with skilled acute occupational therapy, recommend: To be determined: pending progress, anticipate need for rehab upon D/C. Recommend with nursing patient to complete as able in order to maintain strength, endurance and independence: bed to chair position 3x/day and functional mobility rolling L/R in bed with 2 assist. Thank you for your assistance. Full evaluation to follow.

## 2019-09-09 NOTE — PROGRESS NOTES
09/09/19 0834   Weaning Parameters   Spontaneous Breathing Trial Complete Yes   Resp Rate Observed 23   Ve 10.5      RSBI 31   Patient passed SBT, cuff leak is presented

## 2019-09-09 NOTE — DIABETES MGMT
[facility]    D[facility] Progress Note    Recommendations/ Comments: Consult received for new dx diabetes. Pt extubated this AM, on Bipap. Will complete consult when appropriate. BG's in range; has required no lispro correction in past 24 hours   NPO    DTC will continue to follow     Current hospital DM medication:   Lispro normal sensitivity correction scale      Chart reviewed on [facility]. Patient is a 64 y.o. female with no hx DM     A1c:   Lab Results   Component Value Date/Time    Hemoglobin A1c 7.2 (H) 09/06/2019 08:56 AM    Hemoglobin A1c 6.9 (H) 09/05/2019 09:04 PM       Recent Glucose Results:   Lab Results   Component Value Date/Time     (H) 09/09/2019 06:05 AM     (H) 09/08/2019 02:16 PM    GLUCPOC 132 (H) 09/08/2019 11:58 PM    GLUCPOC 115 (H) 09/08/2019 05:27 PM    GLUCPOC 137 (H) 09/08/2019 02:15 PM        Lab Results   Component Value Date/Time    Creatinine 0.81 09/09/2019 06:05 AM     Estimated Creatinine Clearance: 110.9 mL/min (based on SCr of 0.81 mg/dL). Active Orders   Diet    DIET NPO        PO intake: No data found. Will continue to follow as needed.     Thank you  Hayes Calderon RN, CDE        Time spent: 4 min

## 2019-09-09 NOTE — PROGRESS NOTES
Neurology Progress Note     NAME: Gianfranco Counter   :  1960   MRN:  [id number]   DATE:  2019    Assessment:     Active Problems:    Seizure ([address] (2019)      Altered mental status (2019)      Elevated serum creatinine (2019)      Left-sided weakness (2019)      Uncontrolled hypertension (2019)      Pt is a 62yo female who presented 19 with witnessed seizure x 2, given versed by EMS, , , O2 sat 78%. She reportedly had right gaze pref and left sided weakness in ED. Normal CTH, CTA H/N. Received IV TPA. Given Keppra 2000mg IV. MRI brain neg for stroke or epileptogenic focus. EEG with changes c/w propofol. Pt denies h/o seizure, h/o concussion as a child, no family h/o epilepsy, no h/o CNS infection. Recent admissions to other hospitals for COPD exacerbation.      Plan:   -EEG today  -Will not start an AED unless EEG with evidence of epileptogenic focus  -Please call if needed    Subjective:   Pt extubated, seen with her friend since age 12yo at the bedside.       Objective:   Chart reviewed since last seen    Current Facility-Administered Medications   Medication Dose Route Frequency    bumetanide (BUMEX) injection 1 mg  1 mg IntraVENous ONCE    arformoterol (BROVANA) neb solution 15 mcg  15 mcg Nebulization BID RT    And    budesonide (PULMICORT) 500 mcg/2 ml nebulizer suspension  500 mcg Nebulization BID RT    [START ON 9/10/2019] aspirin chewable tablet 81 mg  81 mg Oral DAILY    furosemide (LASIX) injection 40 mg  40 mg IntraVENous DAILY    losartan (COZAAR) tablet 25 mg  25 mg Oral DAILY    famotidine (PF) (PEPCID) 20 mg in sodium chloride 0.9% 10 mL injection  20 mg IntraVENous Q12H    dextrose 5 % - 0.45% NaCl infusion  50 mL/hr IntraVENous CONTINUOUS    fentaNYL (PF) 1,500 mcg/30 mL (50 mcg/mL) infusion  0-200 mcg/hr IntraVENous TITRATE    sodium chloride (NS) flush 5-40 mL  5-40 mL IntraVENous Q8H    sodium chloride (NS) flush 5-40 mL  5-40 mL IntraVENous PRN    bisacodyl (DULCOLAX) suppository 10 mg  10 mg Rectal DAILY PRN    propofol (DIPRIVAN) infusion  0-50 mcg/kg/min IntraVENous TITRATE    fentaNYL citrate (PF) injection 50 mcg  50 mcg IntraVENous Q2H PRN    LORazepam (ATIVAN) injection 2 mg  2 mg IntraVENous Q2H PRN    chlorhexidine (PERIDEX) 0.12 % mouthwash 15 mL  15 mL Oral BID    albuterol-ipratropium (DUO-NEB) 2.5 MG-0.5 MG/3 ML  3 mL Nebulization Q6H RT    dexmedeTOMidine (PRECEDEX) 400 mcg in 0.9% sodium chloride 100 mL infusion  0.2-0.7 mcg/kg/hr IntraVENous TITRATE    niCARdipine (CARDENE) 25 mg in 0.9% sodium chloride 250 mL infusion  5-15 mg/hr IntraVENous TITRATE    glucose chewable tablet 16 g  4 Tab Oral PRN    glucagon (GLUCAGEN) injection 1 mg  1 mg IntraMUSCular PRN    insulin lispro (HUMALOG) injection   SubCUTAneous Q6H    dextrose 10 % infusion 125-250 mL  125-250 mL IntraVENous PRN       Visit Vitals  /90   Pulse 88   Temp 99 °F (37.2 °C)   Resp 28   Ht 5' 3\" (1.6 m)   Wt 150.6 kg (332 lb 1.6 oz)   SpO2 96%   BMI 58.83 kg/m²     Temp (24hrs), Av.7 °F (37.1 °C), Min:98.1 °F (36.7 °C), Max:99.6 °F (37.6 °C)      701 -  1900  In: 403.3 [I.V.:403.3]  Out: 4925 [Urine:4925]  1901 -  0700  In: 3978.7 [I.V.:3918.7]  Out: 4495 [Urine:4445]      Physical Exam:  General: Well developed well nourished patient in no apparent distress. Cardiac: Regular rate and rhythm with no murmurs. Extremities: 2+ Radial pulses, no cyanosis    Neurological Exam:  Mental Status: Oriented to time, place and person. Speech and language intact. Attention and fund of knowledge appropriate. Normal recent and remote memory. Cranial Nerves:   VFF, PERRL, EOMI, no nystagmus, no ptosis. Facial movement is symmetric. Palate is midline. Tongue is midline. Hearing is intact. Motor:  5/5 strength in upper and lower proximal and distal muscles. Normal bulk and tone.  No tremors   Reflexes:      Sensory:      Gait:     Cerebellar:           Lab Review   Recent Results (from the past 24 hour(s))   GLUCOSE, POC    Collection Time: 09/08/19  2:15 PM   Result Value Ref Range    Glucose (POC) 137 (H) 65 - 100 mg/dL    Performed by Alray Squibb    CBC W/O DIFF    Collection Time: 09/08/19  2:16 PM   Result Value Ref Range    WBC 9.6 3.6 - 11.0 K/uL    RBC 3.18 (L) 3.80 - 5.20 M/uL    HGB 10.0 (L) 11.5 - 16.0 g/dL    HCT 31.9 (L) 35.0 - 47.0 %    .3 (H) 80.0 - 99.0 FL    MCH 31.4 26.0 - 34.0 PG    MCHC 31.3 30.0 - 36.5 g/dL    RDW 12.9 11.5 - 14.5 %    PLATELET 982 802 - 585 K/uL    MPV 11.5 8.9 - 12.9 FL    NRBC 0.0 0  WBC    ABSOLUTE NRBC 0.00 0.00 - 1.00 K/uL   METABOLIC PANEL, BASIC    Collection Time: 09/08/19  2:16 PM   Result Value Ref Range    Sodium 146 (H) 136 - 145 mmol/L    Potassium 4.0 3.5 - 5.1 mmol/L    Chloride 112 (H) 97 - 108 mmol/L    CO2 28 21 - 32 mmol/L    Anion gap 6 5 - 15 mmol/L    Glucose 135 (H) 65 - 100 mg/dL    BUN 11 6 - 20 MG/DL    Creatinine 0.85 0.55 - 1.02 MG/DL    BUN/Creatinine ratio 13 12 - 20      GFR est AA >60 >60 ml/min/1.73m2    GFR est non-AA >60 >60 ml/min/1.73m2    Calcium 8.2 (L) 8.5 - 10.1 MG/DL   GLUCOSE, POC    Collection Time: 09/08/19  5:27 PM   Result Value Ref Range    Glucose (POC) 115 (H) 65 - 100 mg/dL    Performed by Alray Squibb    GLUCOSE, POC    Collection Time: 09/08/19 11:58 PM   Result Value Ref Range    Glucose (POC) 132 (H) 65 - 100 mg/dL    Performed by Maya Disla    CBC WITH AUTOMATED DIFF    Collection Time: 09/09/19  6:05 AM   Result Value Ref Range    WBC 9.3 3.6 - 11.0 K/uL    RBC 3.53 (L) 3.80 - 5.20 M/uL    HGB 10.9 (L) 11.5 - 16.0 g/dL    HCT 35.0 35.0 - 47.0 %    MCV 99.2 (H) 80.0 - 99.0 FL    MCH 30.9 26.0 - 34.0 PG    MCHC 31.1 30.0 - 36.5 g/dL    RDW 13.1 11.5 - 14.5 %    PLATELET 222 140 - 284 K/uL    MPV 11.6 8.9 - 12.9 FL    NRBC 0.0 0  WBC    ABSOLUTE NRBC 0.00 0.00 - 0.01 K/uL    NEUTROPHILS 74 32 - 75 %    LYMPHOCYTES 12 12 - 49 %    MONOCYTES 10 5 - 13 %    EOSINOPHILS 3 0 - 7 %    BASOPHILS 1 0 - 1 %    IMMATURE GRANULOCYTES 0 0.0 - 0.5 %    ABS. NEUTROPHILS 7.0 1.8 - 8.0 K/UL    ABS. LYMPHOCYTES 1.1 0.8 - 3.5 K/UL    ABS. MONOCYTES 0.9 0.0 - 1.0 K/UL    ABS. EOSINOPHILS 0.3 0.0 - 0.4 K/UL    ABS. BASOPHILS 0.1 0.0 - 0.1 K/UL    ABS. IMM. GRANS. 0.0 0.00 - 0.04 K/UL    DF AUTOMATED     METABOLIC PANEL, COMPREHENSIVE    Collection Time: 09/09/19  6:05 AM   Result Value Ref Range    Sodium 148 (H) 136 - 145 mmol/L    Potassium 3.7 3.5 - 5.1 mmol/L    Chloride 112 (H) 97 - 108 mmol/L    CO2 29 21 - 32 mmol/L    Anion gap 7 5 - 15 mmol/L    Glucose 120 (H) 65 - 100 mg/dL    BUN 10 6 - 20 MG/DL    Creatinine 0.81 0.55 - 1.02 MG/DL    BUN/Creatinine ratio 12 12 - 20      GFR est AA >60 >60 ml/min/1.73m2    GFR est non-AA >60 >60 ml/min/1.73m2    Calcium 8.3 (L) 8.5 - 10.1 MG/DL    Bilirubin, total 0.5 0.2 - 1.0 MG/DL    ALT (SGPT) 22 12 - 78 U/L    AST (SGOT) 18 15 - 37 U/L    Alk.  phosphatase 50 45 - 117 U/L    Protein, total 6.0 (L) 6.4 - 8.2 g/dL    Albumin 2.9 (L) 3.5 - 5.0 g/dL    Globulin 3.1 2.0 - 4.0 g/dL    A-G Ratio 0.9 (L) 1.1 - 2.2     GLUCOSE, POC    Collection Time: 09/09/19 11:42 AM   Result Value Ref Range    Glucose (POC) 157 (H) 65 - 100 mg/dL    Performed by Chaparrita Zuleta, COMPREHENSIVE    Collection Time: 09/09/19 12:42 PM   Result Value Ref Range    Sodium 146 (H) 136 - 145 mmol/L    Potassium 3.7 3.5 - 5.1 mmol/L    Chloride 108 97 - 108 mmol/L    CO2 32 21 - 32 mmol/L    Anion gap 6 5 - 15 mmol/L    Glucose 139 (H) 65 - 100 mg/dL    BUN 9 6 - 20 MG/DL    Creatinine 0.86 0.55 - 1.02 MG/DL    BUN/Creatinine ratio 10 (L) 12 - 20      GFR est AA >60 >60 ml/min/1.73m2    GFR est non-AA >60 >60 ml/min/1.73m2    Calcium 8.6 8.5 - 10.1 MG/DL    Bilirubin, total 0.8 0.2 - 1.0 MG/DL    ALT (SGPT) 31 12 - 78 U/L    AST (SGOT) 30 15 - 37 U/L    Alk. phosphatase 58 45 - 117 U/L    Protein, total 7.0 6.4 - 8.2 g/dL    Albumin 3.4 (L) 3.5 - 5.0 g/dL    Globulin 3.6 2.0 - 4.0 g/dL    A-G Ratio 0.9 (L) 1.1 - 2.2         Additional comments:  I have reviewed the patient's new clinical lab test results. I have personally reviewed the patient's radiographs. MRI  MRI Results (most recent):  Results from [place] encounter on 09/05/19   MRI BRAIN WO CONT    Narrative BRAIN MRI WITHOUT CONTRAST: 9/6/2019 9:55 PM    INDICATION: Focal neuro deficit, new, fixed or worsening, 3-24 hours. Left  extremity weakness. COMPARISON: CT head 9/5/2019, CTA head and neck and CT perfusion 9/5/2019. TECHNIQUE: Images were obtained in multiple planes and sequences to emphasize  T1, T2, and T2* information. In addition, diffusion-weighted images and ADC maps  were also obtained. FINDINGS: The ventricles and sulci are appropriate for age. The main  intracranial flow-voids are normal. Scattered periventricular and subcortical  white matter signal abnormalities are consistent with chronic small vessel  ischemic disease. No restricted diffusion to suggest acute infarction. No  hemorrhage. Paranasal sinus mucosal disease is mild. There are bilateral mastoid  effusions. The examination is mildly motion limited. Impression IMPRESSION: No acute intracranial abnormality. Care Plan discussed with:  Patient x   Best Friend x   RN x   Care Manager    Consultant/Specialist:       Signed: Sharda Zaragoza MD

## 2019-09-09 NOTE — PROGRESS NOTES
Speech Path    Remains intubated and sedated. SLP following for eval as appropirate. Sarah Pope MS, CCC-SLP, [facility]

## 2019-09-09 NOTE — PROGRESS NOTES
0730: Bedside and Verbal shift change report given to Chery Hutchison RN (oncoming nurse) by Trevor Anthony RN (offgoing nurse).  Report included the following information SBAR, Kardex, ED Summary, Intake/Output, MAR, Accordion, Recent Results, Med Rec Status and Cardiac Rhythm NSR.     0800: SBT started    0905: extubated to BiPAP    1300: Pt placed on 5L nasal canula per MD order

## 2019-09-09 NOTE — PROGRESS NOTES
[facility]  INTENSIVIST DAILY PROGRESS NOTE  Name: Gianfranco Bueno   : 1960   MRN: [id number]   Date: 2019 7:50 AM   I have reviewed the flowsheet and previous days notes. Remains on vent passed SBT. RSBI 30 + cuff leak      Vital Signs:    Visit Vitals  /82 (BP 1 Location: Right arm, BP Patient Position: At rest)   Pulse 85   Temp 99.6 °F (37.6 °C)   Resp 17   Ht 5' 3\" (1.6 m)   Wt 150.6 kg (332 lb 1.6 oz)   SpO2 94%   BMI 58.83 kg/m²       O2 Device: Ventilator   O2 Flow Rate (L/min): 5 l/min   Temp (24hrs), Av.7 °F (37.1 °C), Min:98.1 °F (36.7 °C), Max:99.6 °F (37.6 °C)       Intake/Output:   Last shift:       07 -  1900  In: 203.2 [I.V.:203.2]  Out: 0   Last 3 shifts:  190 -  07  In: 3978.7 [I.V.:3918.7]  Out: 5614 [Urine:4445]    Intake/Output Summary (Last 24 hours) at 2019 0858  Last data filed at 2019 0800  Gross per 24 hour   Intake 2105.77 ml   Output 3895 ml   Net -1789.23 ml       Ventilator Settings:  Ventilator Mode: Spontaneous  Respiratory Rate  Resp Rate Observed: 23  Back-Up Rate: 16  Insp Flow (l/min): 50 l/min  Ventilator Volumes  Vt Set (ml): 450 ml  Vt Exhaled (Machine Breath) (ml): 511 ml  Ve Observed (l/min): 10.2 l/min  Ventilator Pressures  PIP Observed (cm H2O): 21 cm H2O  Plateau Pressure (cm H2O): 19 cm H2O  MAP (cm H2O): 10  PEEP/VENT (cm H2O): 5 cm H20  Auto PEEP Observed (cm H2O): 0.1 cm H2O    Physical Exam:  General:  Sedated, no distress   Head:  Normocephalic   Eyes:  xanthelasma   Nose: Nares normal.    Throat: ETT   Neck: thick       Lungs:   Clear to auscultation bilaterally. No wheeze today. Chest wall:  No tenderness or deformity. Heart:  Slight bradycardia   Abdomen:   Soft, obese   Extremities: Chronic stasis changes and edema.        Skin: dry       Neurologic: sedated       DATA:   Current Facility-Administered Medications   Medication Dose Route Frequency    dextrose 5 % - 0.45% NaCl infusion  50 mL/hr IntraVENous CONTINUOUS    fentaNYL (PF) 1,500 mcg/30 mL (50 mcg/mL) infusion  0-200 mcg/hr IntraVENous TITRATE    famotidine (PF) (PEPCID) 20 mg in sodium chloride 0.9% 10 mL injection  20 mg IntraVENous DAILY    furosemide (LASIX) injection 40 mg  40 mg IntraVENous DAILY    sodium chloride (NS) flush 5-40 mL  5-40 mL IntraVENous Q8H    sodium chloride (NS) flush 5-40 mL  5-40 mL IntraVENous PRN    bisacodyl (DULCOLAX) suppository 10 mg  10 mg Rectal DAILY PRN    propofol (DIPRIVAN) infusion  0-50 mcg/kg/min IntraVENous TITRATE    fentaNYL citrate (PF) injection 50 mcg  50 mcg IntraVENous Q2H PRN    LORazepam (ATIVAN) injection 2 mg  2 mg IntraVENous Q2H PRN    chlorhexidine (PERIDEX) 0.12 % mouthwash 15 mL  15 mL Oral BID    albuterol-ipratropium (DUO-NEB) 2.5 MG-0.5 MG/3 ML  3 mL Nebulization Q6H RT    aspirin (ASA) suppository 300 mg  300 mg Rectal DAILY    dexmedeTOMidine (PRECEDEX) 400 mcg in 0.9% sodium chloride 100 mL infusion  0.2-0.7 mcg/kg/hr IntraVENous TITRATE    niCARdipine (CARDENE) 25 mg in 0.9% sodium chloride 250 mL infusion  5-15 mg/hr IntraVENous TITRATE    glucose chewable tablet 16 g  4 Tab Oral PRN    glucagon (GLUCAGEN) injection 1 mg  1 mg IntraMUSCular PRN    insulin lispro (HUMALOG) injection   SubCUTAneous Q6H    dextrose 10 % infusion 125-250 mL  125-250 mL IntraVENous PRN       Telemetry:          Labs:  Recent Labs     09/09/19  0605 09/08/19  1416 09/07/19  0447   WBC 9.3 9.6 8.8   HGB 10.9* 10.0* 11.2*   HCT 35.0 31.9* 34.4*    207 194     Recent Labs     09/09/19  0605 09/08/19  1416 09/07/19  0435   * 146* 146*   K 3.7 4.0 3.2*   * 112* 111*   CO2 29 28 27   * 135* 159*   BUN 10 11 11   CREA 0.81 0.85 0.77   CA 8.3* 8.2* 7.8*   MG  --   --  2.0   PHOS  --   --  2.7   ALB 2.9*  --  2.8*   SGOT 18  --  10*   ALT 22  --  15     No results for input(s): PH, PCO2, PO2, HCO3, FIO2 in the last 72 hours.     Imaging:  I have personally reviewed the patients radiographs and reports. Left base atx, ? Edema. IMPRESSION:   · Acute on chronic hypercapneic resp failure ([facility] and by history has COPD)- CXR with LLl ATX. Passed SBT  · Sz/AMS  · Initial concern for CVA, s/p TPA  · Obesity   PLAN:   · Diuresis  · Extubate to NIV  · SUP  · Off sedation  · Replete potassium  · Continue nebs   GLOBAL ISSUES:   · Head of bed elevated  · GI Prophylaxis: H2 blocker  · DVT Prophylaxis: SCDs     The pt is critically ill. CCT EOP 30 min. See my orders for details. My assessment/plan was discussed with: nurse    Total critical care time exclusive of procedures 30 minutes.     Chaka Mclaughlin MD

## 2019-09-09 NOTE — PROGRESS NOTES
Occupational therapy 2614 -   [phone]    Chart reviewed in prep for OT evaluation, RN reporting patient recently extubated, now on BIPAP until 1300. Will defer OT evaluation until this PM. Thank you. Star Abrams MS, OTR/L

## 2019-09-10 LAB
ANION GAP SERPL CALC-SCNC: 7 MMOL/L (ref 5–15)
ARTERIAL PATENCY WRIST A: YES
BASE EXCESS BLD CALC-SCNC: 4 MMOL/L
BASOPHILS # BLD: 0 K/UL (ref 0–0.1)
BASOPHILS NFR BLD: 0 % (ref 0–1)
BDY SITE: ABNORMAL
BUN SERPL-MCNC: 8 MG/DL (ref 6–20)
BUN/CREAT SERPL: 11 (ref 12–20)
CALCIUM SERPL-MCNC: 8.8 MG/DL (ref 8.5–10.1)
CHLORIDE SERPL-SCNC: 109 MMOL/L (ref 97–108)
CO2 SERPL-SCNC: 30 MMOL/L (ref 21–32)
CREAT SERPL-MCNC: 0.75 MG/DL (ref 0.55–1.02)
DIFFERENTIAL METHOD BLD: NORMAL
EOSINOPHIL # BLD: 0.3 K/UL (ref 0–0.4)
EOSINOPHIL NFR BLD: 3 % (ref 0–7)
ERYTHROCYTE [DISTWIDTH] IN BLOOD BY AUTOMATED COUNT: 12.6 % (ref 11.5–14.5)
GAS FLOW.O2 O2 DELIVERY SYS: ABNORMAL L/MIN
GLUCOSE BLD STRIP.AUTO-MCNC: 133 MG/DL (ref 65–100)
GLUCOSE BLD STRIP.AUTO-MCNC: 135 MG/DL (ref 65–100)
GLUCOSE BLD STRIP.AUTO-MCNC: 136 MG/DL (ref 65–100)
GLUCOSE BLD STRIP.AUTO-MCNC: 150 MG/DL (ref 65–100)
GLUCOSE BLD STRIP.AUTO-MCNC: 172 MG/DL (ref 65–100)
GLUCOSE SERPL-MCNC: 131 MG/DL (ref 65–100)
HCO3 BLD-SCNC: 29.6 MMOL/L (ref 22–26)
HCT VFR BLD AUTO: 38.1 % (ref 35–47)
HGB BLD-MCNC: 12 G/DL (ref 11.5–16)
IMM GRANULOCYTES # BLD AUTO: 0 K/UL (ref 0–0.04)
IMM GRANULOCYTES NFR BLD AUTO: 0 % (ref 0–0.5)
LYMPHOCYTES # BLD: 1.5 K/UL (ref 0.8–3.5)
LYMPHOCYTES NFR BLD: 17 % (ref 12–49)
MCH RBC QN AUTO: 31.1 PG (ref 26–34)
MCHC RBC AUTO-ENTMCNC: 31.5 G/DL (ref 30–36.5)
MCV RBC AUTO: 98.7 FL (ref 80–99)
MONOCYTES # BLD: 0.8 K/UL (ref 0–1)
MONOCYTES NFR BLD: 10 % (ref 5–13)
NEUTS SEG # BLD: 5.9 K/UL (ref 1.8–8)
NEUTS SEG NFR BLD: 70 % (ref 32–75)
NRBC # BLD: 0 K/UL (ref 0–0.01)
NRBC BLD-RTO: 0 PER 100 WBC
O2/TOTAL GAS SETTING VFR VENT: 35 %
PCO2 BLD: 54.7 MMHG (ref 35–45)
PEEP RESPIRATORY: 7 CMH2O
PH BLD: 7.34 [PH] (ref 7.35–7.45)
PLATELET # BLD AUTO: 213 K/UL (ref 150–400)
PMV BLD AUTO: 11.1 FL (ref 8.9–12.9)
PO2 BLD: 80 MMHG (ref 80–100)
POTASSIUM SERPL-SCNC: 3.9 MMOL/L (ref 3.5–5.1)
PRESSURE SUPPORT SETTING VENT: 3 CMH2O
RBC # BLD AUTO: 3.86 M/UL (ref 3.8–5.2)
SAO2 % BLD: 95 % (ref 92–97)
SERVICE CMNT-IMP: ABNORMAL
SODIUM SERPL-SCNC: 146 MMOL/L (ref 136–145)
SPECIMEN TYPE: ABNORMAL
TOTAL RESP. RATE, ITRR: 20
WBC # BLD AUTO: 8.4 K/UL (ref 3.6–11)

## 2019-09-10 PROCEDURE — 74011000250 HC RX REV CODE- 250: Performed by: INTERNAL MEDICINE

## 2019-09-10 PROCEDURE — 36600 WITHDRAWAL OF ARTERIAL BLOOD: CPT

## 2019-09-10 PROCEDURE — 97161 PT EVAL LOW COMPLEX 20 MIN: CPT

## 2019-09-10 PROCEDURE — 77010033678 HC OXYGEN DAILY

## 2019-09-10 PROCEDURE — 94640 AIRWAY INHALATION TREATMENT: CPT

## 2019-09-10 PROCEDURE — 94664 DEMO&/EVAL PT USE INHALER: CPT

## 2019-09-10 PROCEDURE — 92610 EVALUATE SWALLOWING FUNCTION: CPT | Performed by: SPEECH-LANGUAGE PATHOLOGIST

## 2019-09-10 PROCEDURE — 85025 COMPLETE CBC W/AUTO DIFF WBC: CPT

## 2019-09-10 PROCEDURE — 5A09357 ASSISTANCE WITH RESPIRATORY VENTILATION, LESS THAN 24 CONSECUTIVE HOURS, CONTINUOUS POSITIVE AIRWAY PRESSURE: ICD-10-PCS | Performed by: HOSPITALIST

## 2019-09-10 PROCEDURE — 94660 CPAP INITIATION&MGMT: CPT

## 2019-09-10 PROCEDURE — 74011000258 HC RX REV CODE- 258: Performed by: HOSPITALIST

## 2019-09-10 PROCEDURE — 74011250636 HC RX REV CODE- 250/636: Performed by: HOSPITALIST

## 2019-09-10 PROCEDURE — 74011000258 HC RX REV CODE- 258: Performed by: INTERNAL MEDICINE

## 2019-09-10 PROCEDURE — 74011250637 HC RX REV CODE- 250/637: Performed by: HOSPITALIST

## 2019-09-10 PROCEDURE — 74011636637 HC RX REV CODE- 636/637: Performed by: FAMILY MEDICINE

## 2019-09-10 PROCEDURE — 65660000000 HC RM CCU STEPDOWN

## 2019-09-10 PROCEDURE — 80048 BASIC METABOLIC PNL TOTAL CA: CPT

## 2019-09-10 PROCEDURE — 74011000250 HC RX REV CODE- 250: Performed by: HOSPITALIST

## 2019-09-10 PROCEDURE — 36415 COLL VENOUS BLD VENIPUNCTURE: CPT

## 2019-09-10 PROCEDURE — 97116 GAIT TRAINING THERAPY: CPT

## 2019-09-10 PROCEDURE — 82803 BLOOD GASES ANY COMBINATION: CPT

## 2019-09-10 PROCEDURE — 77030029684 HC NEB SM VOL KT MONA -A

## 2019-09-10 PROCEDURE — 82962 GLUCOSE BLOOD TEST: CPT

## 2019-09-10 RX ORDER — SODIUM CHLORIDE 450 MG/100ML
75 INJECTION, SOLUTION INTRAVENOUS CONTINUOUS
Status: DISCONTINUED | OUTPATIENT
Start: 2019-09-10 | End: 2019-09-11

## 2019-09-10 RX ORDER — LOSARTAN POTASSIUM 25 MG/1
25 TABLET ORAL DAILY
Status: DISCONTINUED | OUTPATIENT
Start: 2019-09-11 | End: 2019-09-12

## 2019-09-10 RX ADMIN — Medication 10 ML: at 06:00

## 2019-09-10 RX ADMIN — IPRATROPIUM BROMIDE AND ALBUTEROL SULFATE 3 ML: .5; 3 SOLUTION RESPIRATORY (INHALATION) at 15:04

## 2019-09-10 RX ADMIN — FUROSEMIDE 40 MG: 10 INJECTION, SOLUTION INTRAMUSCULAR; INTRAVENOUS at 08:17

## 2019-09-10 RX ADMIN — Medication 10 ML: at 19:03

## 2019-09-10 RX ADMIN — IPRATROPIUM BROMIDE AND ALBUTEROL SULFATE 3 ML: .5; 3 SOLUTION RESPIRATORY (INHALATION) at 03:04

## 2019-09-10 RX ADMIN — FAMOTIDINE 20 MG: 10 INJECTION, SOLUTION INTRAVENOUS at 08:17

## 2019-09-10 RX ADMIN — IPRATROPIUM BROMIDE AND ALBUTEROL SULFATE 3 ML: .5; 3 SOLUTION RESPIRATORY (INHALATION) at 22:03

## 2019-09-10 RX ADMIN — IPRATROPIUM BROMIDE AND ALBUTEROL SULFATE 3 ML: .5; 3 SOLUTION RESPIRATORY (INHALATION) at 07:36

## 2019-09-10 RX ADMIN — INSULIN LISPRO 2 UNITS: 100 INJECTION, SOLUTION INTRAVENOUS; SUBCUTANEOUS at 19:00

## 2019-09-10 RX ADMIN — ASPIRIN 81 MG CHEWABLE TABLET 81 MG: 81 TABLET CHEWABLE at 08:16

## 2019-09-10 RX ADMIN — Medication 10 ML: at 21:36

## 2019-09-10 RX ADMIN — SODIUM CHLORIDE 75 ML/HR: 450 INJECTION, SOLUTION INTRAVENOUS at 17:22

## 2019-09-10 RX ADMIN — BUDESONIDE 500 MCG: 0.5 INHALANT RESPIRATORY (INHALATION) at 22:03

## 2019-09-10 RX ADMIN — BUDESONIDE 500 MCG: 0.5 INHALANT RESPIRATORY (INHALATION) at 07:36

## 2019-09-10 RX ADMIN — FAMOTIDINE 20 MG: 10 INJECTION, SOLUTION INTRAVENOUS at 21:36

## 2019-09-10 RX ADMIN — DEXTROSE MONOHYDRATE AND SODIUM CHLORIDE 50 ML/HR: 5; .45 INJECTION, SOLUTION INTRAVENOUS at 15:17

## 2019-09-10 RX ADMIN — LOSARTAN POTASSIUM 25 MG: 25 TABLET ORAL at 08:16

## 2019-09-10 NOTE — PROGRESS NOTES
Primary Nurse Farrah Morfin and Jessee Stoll, RN, RN performed a dual skin assessment on this patient Impairment noted- see wound doc flow sheet  Philip score is 17  Excoriation/redness- groin, abd folds and under breasts. Red/blanchable spot on sacrum.

## 2019-09-10 NOTE — PROGRESS NOTES
Problem: Mobility Impaired (Adult and Pediatric)  Goal: *Acute Goals and Plan of Care (Insert Text)  Description  FUNCTIONAL STATUS PRIOR TO ADMISSION: Patient was independent and active without use of DME. Wears 2L O2 at baseline. Denies falls. HOME SUPPORT PRIOR TO ADMISSION: The patient lived with 4 roommates but did not require assist. Works full time. Physical Therapy Goals  Initiated 9/10/2019  1. Patient will move from supine to sit and sit to supine , scoot up and down and roll side to side in bed with modified independence within 7 day(s). 2.  Patient will transfer from bed to chair and chair to bed with modified independence using the least restrictive device within 7 day(s). 3.  Patient will perform sit to stand with modified independence within 7 day(s). 4.  Patient will ambulate with modified independence for 150 feet with the least restrictive device within 7 day(s). 5.  Patient will ascend/descend 3 stairs with handrail(s) with modified independence within 7 day(s). Outcome: Progressing Towards Goal     PHYSICAL THERAPY EVALUATION- NEURO POPULATION  Patient: Gianfranco Bueno (30 y.o. female)  Date: 9/10/2019  Primary Diagnosis: Altered mental status [R41.82]  Seizure ([address] [R56.9]  Uncontrolled hypertension [I10]  Elevated serum creatinine [R79.89]  Left-sided weakness [R53.1]        Precautions:   Fall      ASSESSMENT  Based on the objective data described below, the patient presents with impaired functional mobility as compared to baseline level 2* generalized weakness and decreased tolerance to sustained activity and questionable slight intermittent confusion/lability following admission for seizure, L weakness, and respiratory failure requiring intubation from 9/5 - 9/9. Pt received tPA in ED - CT and MRI negative for acute process. Prior to this admission, pt reports that she lived at home with 4 roommates and was indep without use of AD. Does wear 2L O2 at baseline.      Today, neuro exam appears non focal. She was able to complete multiple sit<>stands from various surfaces with SBA and cues for hand placement for safety. Gait training initiated with RW progressing to no AD per baseline - demos WBOS, increased lateral sway, and minor instabilities requiring up to min A for steadying without AD. Remained up in recliner at end of session. Encouraged increased OOB time and ambulating to/from bathroom with RN staff assist to facility return to baseline. Hopeful that she will progress well and be appropriate for discharge home with assist.     Current Level of Function Impacting Discharge (mobility/balance): up to min A for short distance amb without AD    Functional Outcome Measure: The patient scored Total: 10/56 on the Kay Balance Assessment which is indicative of high fall risk. Other factors to consider for discharge: was indep without AD; has supportive roommates who can assist if/as needed     Patient will benefit from skilled therapy intervention to address the above noted impairments. PLAN :  Recommendations and Planned Interventions: bed mobility training, transfer training, gait training, therapeutic exercises, neuromuscular re-education, patient and family training/education and therapeutic activities      Frequency/Duration: Patient will be followed by physical therapy:  5 times a week to address goals. Recommendation for discharge: (in order for the patient to meet his/her long term goals)  To be determined: pending progress - hopeful for home without additional therapy . Abbey Juarez This discharge recommendation:  A follow-up discussion with the attending provider and/or case management is planned    Equipment recommendations for successful discharge (if) home: Possible bariatric RW pending progress         SUBJECTIVE:   Patient stated I've done this 3 times this year - I'll go home again.     OBJECTIVE DATA SUMMARY:   HISTORY:    Past Medical History:   Diagnosis Date Chronic obstructive pulmonary disease (HCC)     Hypertension    No past surgical history on file.     Personal factors and/or comorbidities impacting plan of care: PMHx    Home Situation  Home Environment: Private residence  # Steps to Enter: 3  Rails to Enter: Yes  Hand Rails : Bilateral(too wide to hold both )  One/Two Story Residence: One story  Living Alone: No  Support Systems: Family member(s)  Patient Expects to be Discharged to[de-identified] Private residence  Current DME Used/Available at Home: Oxygen, portable  Tub or Shower Type: Tub/Shower combination    EXAMINATION/PRESENTATION/DECISION MAKING:   Critical Behavior:  Neurologic State: Alert  Orientation Level: Oriented X4  Cognition: Decreased attention/concentration, Follows commands  Safety/Judgement: Awareness of environment  Hearing:     Skin:  erythema distal B LEs  Edema: B LEs  Range Of Motion:  AROM: Generally decreased, functional    Strength:    Strength: Generally decreased, functional     Tone & Sensation:   Tone: Normal    Sensation: Intact        Coordination:  Coordination: Generally decreased, functional  Vision:   Tracking: Able to track stimulus in all quadrants w/o difficulty  Diplopia: No  Functional Mobility:  Bed Mobility:    Transfers:  Sit to Stand: Stand-by assistance  Stand to Sit: Stand-by assistance        Balance:   Sitting: Intact  Sitting - Static: Good (unsupported)  Standing: Impaired  Standing - Static: Good;Constant support  Standing - Dynamic : Fair;Unsupported  Ambulation/Gait Training:  Distance (ft): 40 Feet (ft)(x2)  Assistive Device: Gait belt;Walker, rolling  Ambulation - Level of Assistance: Contact guard assistance;Minimal assistance  Gait Description (WDL): Exceptions to WDL  Gait Abnormalities: Decreased step clearance  Base of Support: Widened  Speed/Sharon: Slow;Shuffled  Step Length: Right shortened;Left shortened     Functional Measure  Kay Balance Test:    Sitting to Standin  Standing Unsupported: 1  Sitting with Back Unsupported: 4  Standing to Sittin  Transfers: 1  Standing Unsupported with Eyes Closed: 0  Standing Unsupported with Feet Together: 0  Reach Forward with Outstretched Arm: 0   Object: 0  Turn to Look Over Shoulders: 0  Turn 360 Degrees: 0  Alternate Foot on Step/Stool: 0  Standing Unsupported One Foot in Front: 0  Stand on One Le  Total: 10/56         56=Maximum possible score;   0-20=High fall risk  21-40=Moderate fall risk   41-56=Low fall risk        Physical Therapy Evaluation Charge Determination   History Examination Presentation Decision-Making   HIGH Complexity :3+ comorbidities / personal factors will impact the outcome/ POC  MEDIUM Complexity : 3 Standardized tests and measures addressing body structure, function, activity limitation and / or participation in recreation  LOW Complexity : Stable, uncomplicated  Other outcome measures knapp  HIGH       Based on the above components, the patient evaluation is determined to be of the following complexity level: LOW     Pain Rating:  None reported    Activity Tolerance:   Fair, desaturates with exertion and requires oxygen, requires rest breaks and observed SOB with activity  Please refer to the flowsheet for vital signs taken during this treatment. After treatment patient left in no apparent distress:   Sitting in chair and Call bell within reach    COMMUNICATION/EDUCATION:   The patients plan of care was discussed with: Registered Nurse. Patient was educated regarding her deficit(s) of resolved L neglect and weakness as this relates to her diagnosis of CVA vs TIA. She demonstrated Good understanding as evidenced by nodding. Patient and/or family was verbally educated on the BE FAST acronym for signs/symptoms of CVA and TIA. BE FAST was written on patient's communication board  for visual education and reinforcement. All questions answered with patient indicating good understanding.        Fall prevention education was provided and the patient/caregiver indicated understanding., Patient/family have participated as able in goal setting and plan of care. and Patient/family agree to work toward stated goals and plan of care.     Thank you for this referral.  Monta Carrel, PT, DPT   Time Calculation: 32 mins

## 2019-09-10 NOTE — PROGRESS NOTES
1321-TRANSFER - IN REPORT:    Verbal report received from [facility]) on Angie Avalos  being received from ICU (unit) for routine progression of care      Report consisted of patients Situation, Background, Assessment and   Recommendations(SBAR). Information from the following report(s) SBAR, Kardex, ED Summary, Intake/Output, MAR, Accordion, Recent Results, Med Rec Status and Cardiac Rhythm NSR was reviewed with the receiving nurse. Opportunity for questions and clarification was provided. Assessment completed upon patients arrival to unit and care assumed.

## 2019-09-10 NOTE — DIABETES MGMT
[facility]    DTC Progress Note    Recommendations/ Comments: Consult received for new dx diabetes. Per chart notes, patient is still confused. Blood sugars currently in target with correction insulin only. DTC will see when appropriate     Current hospital DM medication:   Lispro normal sensitivity correction scale      Chart reviewed on Rubina Gonsales. Patient is a 64 y.o. female with no hx DM     A1c:   Lab Results   Component Value Date/Time    Hemoglobin A1c 7.2 (H) 09/06/2019 08:56 AM    Hemoglobin A1c 6.9 (H) 09/05/2019 09:04 PM       Recent Glucose Results:   Lab Results   Component Value Date/Time     (H) 09/10/2019 06:18 AM    GLUCPOC 136 (H) 09/10/2019 11:58 AM    GLUCPOC 133 (H) 09/10/2019 06:01 AM    GLUCPOC 124 (H) 09/09/2019 11:22 PM        Lab Results   Component Value Date/Time    Creatinine 0.75 09/10/2019 06:18 AM     Estimated Creatinine Clearance: 120.5 mL/min (based on SCr of 0.75 mg/dL). Active Orders   Diet    DIET DIABETIC CONSISTENT CARB Regular        PO intake: No data found. Will continue to follow as needed.     Thank you  Linda Hedrick, MS, RN, CDE    Time spent: 4 min

## 2019-09-10 NOTE — PROGRESS NOTES
Hospitalist Progress Note  Amy Whitaker MD  Answering service: [phone] from in house phone  Cell: [phone]      Date of Service:  9/10/2019  NAME:  Bc Webb  :  1960  MRN:  [id number]      Admission Summary:     A 22-year-old white female with past medical history of COPD, obstructive sleep apnea, hypertension, morbid obesity, peripheral edema, presented to the emergency department via EMS with reported seizures and altered mental status. Interval history / Subjective:     She said she feels better, per her friend at bedside, patient is still confused and emotional      Assessment & Plan:     Acute hypoxic hypercapnic respiratory failure due to acute COPD exacerbation, obesity hypoventilation and possible CORWIN   -continue pulmicort, brovana, duo neb, oxygen support   -chest x ray on  unchanged mild pulmonary edema. New left basilar atelectasis and small left pleural effusion. -monitor pulse ox to wean off oxygen to a saturation of >90%     Acute COPD exacerbation   -improving, continue on duo neb, pulmicort and brovana, monitor pulse ox    Suspected Seizure  -no seizure since admission  -not started on seizure medication   -EEG there are no focal abnormalities, epileptiform discharges, or electrographic seizures seen. abnormal eeg due to diffuse beta activity  -seen by neurologist     Suspected acute CVA, left sided neglect s/p tpA  -continue on aspirin   -CT head no acute intracranial process identified  -CTA head no evidence of large vessel occlusion or perfusion abnormality, no hemodynamically significant carotid artery stenosis.     Acute encephalopathy POA  -improved, patient is conscious and alert, some confusion   -no leukocytosis, fever  -alcohol <10  -continue neuro check and supportive care    HTN  -BP elevated, continue on losartan, monitor BP    T2DM new diagnosis  -A1c 7.2  -finger stick glucose 115-172, continue sliding scale and monitor finger stick glucose    Hypokalemia   -resolved    Hypernatremia   -improving    HTN  -BP not at goal, continue losartan, monitor BP    Hx of lower extremities edema  -on lasix        Code status: Full Code  DVT prophylaxis: SCD    Care Plan discussed with: Patient/Family and Nurse  Disposition: TBD     Hospital Problems  Never Reviewed          Codes Class Noted POA    Seizure ([address] ICD-10-CM: R56.9  ICD-9-CM: 780.39  9/5/2019 Unknown        Altered mental status ICD-10-CM: R41.82  ICD-9-CM: 780.97  9/5/2019 Unknown        Elevated serum creatinine ICD-10-CM: R79.89  ICD-9-CM: 790.99  9/5/2019 Unknown        Left-sided weakness ICD-10-CM: R53.1  ICD-9-CM: 728.87  9/5/2019 Unknown        Uncontrolled hypertension ICD-10-CM: I10  ICD-9-CM: 401.9  9/5/2019 Unknown              Vital Signs:    Last 24hrs VS reviewed since prior progress note. Most recent are:  Visit Vitals  /79 (BP 1 Location: Right arm, BP Patient Position: At rest)   Pulse 79   Temp 98.8 °F (37.1 °C)   Resp 19   Ht 5' 3\" (1.6 m)   Wt 151.8 kg (334 lb 10.5 oz)   SpO2 97%   BMI 59.28 kg/m²         Intake/Output Summary (Last 24 hours) at 9/10/2019 1511  Last data filed at 9/10/2019 1200  Gross per 24 hour   Intake 1050 ml   Output 5275 ml   Net -4225 ml        Physical Examination:             Constitutional:  No acute distress, cooperative, pleasant    ENT:  Oral mucous moist, oropharynx benign. Neck supple,    Resp:  CTA bilaterally. No wheezing/rhonchi/rales. No accessory muscle use   CV:  Regular rhythm, normal rate, no murmurs, gallops, rubs    GI:  Soft, non distended, non tender.  normoactive bowel sounds, no hepatosplenomegaly     Musculoskeletal:  No edema    Neurologic:  Conscious and alert, oriented to place, time and person, moves all extremities, CN II-XII reviewed     Skin:  Good turgor, no rashes or ulcers       Data Review:    Review and/or order of clinical lab test  Review and/or order of tests in the radiology section of CPT  Review and/or order of tests in the medicine section of CPT      Labs:     Recent Labs     09/10/19  0618 09/09/19  0605   WBC 8.4 9.3   HGB 12.0 10.9*   HCT 38.1 35.0    191     Recent Labs     09/10/19  0618 09/09/19  1242 09/09/19 0605   * 146* 148*   K 3.9 3.7 3.7   * 108 112*   CO2 30 32 29   BUN 8 9 10   CREA 0.75 0.86 0.81   * 139* 120*   CA 8.8 8.6 8.3*     Recent Labs     09/09/19 1242 09/09/19  0605   SGOT 30 18   ALT 31 22   AP 58 50   TBILI 0.8 0.5   TP 7.0 6.0*   ALB 3.4* 2.9*   GLOB 3.6 3.1     No results for input(s): INR, PTP, APTT in the last 72 hours. No lab exists for component: INREXT   No results for input(s): FE, TIBC, PSAT, FERR in the last 72 hours. No results found for: FOL, RBCF   No results for input(s): PH, PCO2, PO2 in the last 72 hours. No results for input(s): CPK, CKNDX, TROIQ in the last 72 hours.     No lab exists for component: CPKMB  Lab Results   Component Value Date/Time    Cholesterol, total 175 09/06/2019 08:56 AM    HDL Cholesterol 39 09/06/2019 08:56 AM    LDL, calculated 97.8 09/06/2019 08:56 AM    Triglyceride 191 (H) 09/06/2019 08:56 AM    CHOL/HDL Ratio 4.5 09/06/2019 08:56 AM     Lab Results   Component Value Date/Time    Glucose (POC) 136 (H) 09/10/2019 11:58 AM    Glucose (POC) 133 (H) 09/10/2019 06:01 AM    Glucose (POC) 124 (H) 09/09/2019 11:22 PM    Glucose (POC) 115 (H) 09/09/2019 05:51 PM    Glucose (POC) 157 (H) 09/09/2019 11:42 AM     No results found for: COLOR, APPRN, SPGRU, REFSG, RAMIRO, PROTU, GLUCU, KETU, BILU, UROU, DEAN, LEUKU, GLUKE, EPSU, BACTU, WBCU, RBCU, CASTS, UCRY      Medications Reviewed:     Current Facility-Administered Medications   Medication Dose Route Frequency    arformoterol (BROVANA) neb solution 15 mcg  15 mcg Nebulization BID RT    And    budesonide (PULMICORT) 500 mcg/2 ml nebulizer suspension  500 mcg Nebulization BID RT    aspirin chewable tablet 81 mg  81 mg Oral DAILY    furosemide (LASIX) injection 40 mg  40 mg IntraVENous DAILY    losartan (COZAAR) tablet 25 mg  25 mg Oral DAILY    famotidine (PF) (PEPCID) 20 mg in sodium chloride 0.9% 10 mL injection  20 mg IntraVENous Q12H    fentaNYL citrate (PF) injection 25 mcg  25 mcg IntraVENous Q4H PRN    dextrose 5 % - 0.45% NaCl infusion  50 mL/hr IntraVENous CONTINUOUS    sodium chloride (NS) flush 5-40 mL  5-40 mL IntraVENous Q8H    sodium chloride (NS) flush 5-40 mL  5-40 mL IntraVENous PRN    bisacodyl (DULCOLAX) suppository 10 mg  10 mg Rectal DAILY PRN    LORazepam (ATIVAN) injection 2 mg  2 mg IntraVENous Q2H PRN    albuterol-ipratropium (DUO-NEB) 2.5 MG-0.5 MG/3 ML  3 mL Nebulization Q6H RT    glucose chewable tablet 16 g  4 Tab Oral PRN    glucagon (GLUCAGEN) injection 1 mg  1 mg IntraMUSCular PRN    insulin lispro (HUMALOG) injection   SubCUTAneous Q6H    dextrose 10 % infusion 125-250 mL  125-250 mL IntraVENous PRN     ______________________________________________________________________  EXPECTED LENGTH OF STAY: 4d 7h  ACTUAL LENGTH OF STAY:          5                 Steph Weber MD

## 2019-09-10 NOTE — PROGRESS NOTES
Speech Pathology bedside swallow evaluation/discharge  Patient: Roddy Edwards (29 y.o. female)  Date: 9/10/2019  Primary Diagnosis: Altered mental status [R41.82]  Seizure ([facility] [address] [R56.9]  Uncontrolled hypertension [I10]  Elevated serum creatinine [R79.89]  Left-sided weakness [R53.1]       Precautions:   Fall    ASSESSMENT :  Based on the objective data described below, the patient presents with no oral or pharyngeal dysphagia. Timely and complete mastication, timely swallow initiation and functional hyolaryngeal elevation/excursion via palpation. No s/s of aspiration. Patient does c/o choking easily, but only if she eats/drinks without sitting up first.    Skilled acute therapy provided by a speech-language pathologist is not indicated at this time. PLAN :  Recommendations:  --regular diet. No further SLP needs   Discharge Recommendations: None     SUBJECTIVE:   Patient stated I need you to be able to hear that song that's playing. When asked if she needed anything else at the end of the session. Patient hearing songs and clapping that were not present in the room. OBJECTIVE:     Past Medical History:   Diagnosis Date    Chronic obstructive pulmonary disease ([facility] [address]     Hypertension    No past surgical history on file.   Prior Level of Function/Home Situation:   Home Situation  Home Environment: Private residence  # Steps to Enter: 3  Rails to Enter: Yes  Hand Rails : Bilateral(too wide to hold both )  One/Two Story Residence: One story  Living Alone: No  Support Systems: Other (comments)(4 room mates)  Current DME Used/Available at Home: Shower chair, Grab bars  Tub or Shower Type: Tub/Shower combination  Diet prior to admission: regular  Current Diet:  Regular    Cognitive and Communication Status:  Neurologic State: Alert, Confused  Orientation Level: Oriented X4(however, hearing music and clapping in the room )  Cognition: Decreased attention/concentration, Follows commands  Perception: Appears intact  Perseveration: Perseverates during conversation  Safety/Judgement: Awareness of environment, Decreased awareness of need for assistance, Decreased awareness of need for safety, Decreased insight into deficits, Fall prevention  Oral Assessment:  Oral Assessment  Labial: No impairment  Oral Hygiene: moist mucosa   Lingual: No impairment  Mandible: No impairment  P.O. Trials:  Patient Position: upright in bed   Vocal quality prior to P.O.: No impairment  Consistency Presented: Thin liquid; Solid;Puree  How Presented: Self-fed/presented;Spoon;Straw;Successive swallows     Bolus Acceptance: No impairment  Bolus Formation/Control: No impairment     Propulsion: No impairment  Oral Residue: None  Initiation of Swallow: No impairment  Laryngeal Elevation: Functional  Aspiration Signs/Symptoms: None  Pharyngeal Phase Characteristics: No impairment, issues, or problems              Oral Phase Severity: No impairment  Pharyngeal Phase Severity : No impairment  NOMS:   The NOMS functional outcome measure was used to quantify this patient's level of swallowing impairment. Based on the NOMS, the patient was determined to be at level 7 for swallow function     NOMS Swallowing Levels:  Level 1 (CN): NPO  Level 2 (CM): NPO but takes consistency in therapy  Level 3 (CL): Takes less than 50% of nutrition p.o. and continues with nonoral feedings; and/or safe with mod cues; and/or max diet restriction  Level 4 (CK): Safe swallow but needs mod cues; and/or mod diet restriction; and/or still requires some nonoral feeding/supplements  Level 5 (CJ): Safe swallow with min diet restriction; and/or needs min cues  Level 6 (CI): Independent with p.o.; rare cues; usually self cues; may need to avoid some foods or needs extra time  Level 7 ([address] [place] [address]): Independent for all p.o.  LEONELA. (2003). National Outcomes Measurement System (NOMS): Adult Speech-Language Pathology User's Guide.        Pain:  Pain Scale 1: Numeric (0 - 10)  Pain Intensity 1: 0 After treatment:   [] Patient left in no apparent distress sitting up in chair  [x] Patient left in no apparent distress in bed  [x] Call bell left within reach  [x] Nursing notified  [] Caregiver present  [] Bed alarm activated    COMMUNICATION/EDUCATION:   The patients plan of care including findings, recommendations, and recommended diet changes were discussed with: Registered Nurse. [x] Patient/family have participated as able and agree with findings and recommendations. [] Patient is unable to participate in plan of care at this time. Thank you for this referral.  Jameson Cummings M.CD.  CCC-SLP   Time Calculation: 15 mins

## 2019-09-11 LAB
ALBUMIN SERPL-MCNC: 3.4 G/DL (ref 3.5–5)
ALBUMIN/GLOB SERPL: 0.9 {RATIO} (ref 1.1–2.2)
ALP SERPL-CCNC: 60 U/L (ref 45–117)
ALT SERPL-CCNC: 39 U/L (ref 12–78)
ANION GAP SERPL CALC-SCNC: 10 MMOL/L (ref 5–15)
ANION GAP SERPL CALC-SCNC: 11 MMOL/L (ref 5–15)
AST SERPL-CCNC: 30 U/L (ref 15–37)
BILIRUB SERPL-MCNC: 0.8 MG/DL (ref 0.2–1)
BUN SERPL-MCNC: 18 MG/DL (ref 6–20)
BUN SERPL-MCNC: 22 MG/DL (ref 6–20)
BUN/CREAT SERPL: 17 (ref 12–20)
BUN/CREAT SERPL: 22 (ref 12–20)
CALCIUM SERPL-MCNC: 9.2 MG/DL (ref 8.5–10.1)
CALCIUM SERPL-MCNC: 9.5 MG/DL (ref 8.5–10.1)
CHLORIDE SERPL-SCNC: 104 MMOL/L (ref 97–108)
CHLORIDE SERPL-SCNC: 104 MMOL/L (ref 97–108)
CO2 SERPL-SCNC: 27 MMOL/L (ref 21–32)
CO2 SERPL-SCNC: 29 MMOL/L (ref 21–32)
CREAT SERPL-MCNC: 0.98 MG/DL (ref 0.55–1.02)
CREAT SERPL-MCNC: 1.03 MG/DL (ref 0.55–1.02)
ERYTHROCYTE [DISTWIDTH] IN BLOOD BY AUTOMATED COUNT: 12.5 % (ref 11.5–14.5)
GLOBULIN SER CALC-MCNC: 3.9 G/DL (ref 2–4)
GLUCOSE BLD STRIP.AUTO-MCNC: 134 MG/DL (ref 65–100)
GLUCOSE BLD STRIP.AUTO-MCNC: 138 MG/DL (ref 65–100)
GLUCOSE BLD STRIP.AUTO-MCNC: 145 MG/DL (ref 65–100)
GLUCOSE BLD STRIP.AUTO-MCNC: 145 MG/DL (ref 65–100)
GLUCOSE BLD STRIP.AUTO-MCNC: 156 MG/DL (ref 65–100)
GLUCOSE SERPL-MCNC: 137 MG/DL (ref 65–100)
GLUCOSE SERPL-MCNC: 143 MG/DL (ref 65–100)
HCT VFR BLD AUTO: 43 % (ref 35–47)
HGB BLD-MCNC: 13.7 G/DL (ref 11.5–16)
MAGNESIUM SERPL-MCNC: 2.1 MG/DL (ref 1.6–2.4)
MCH RBC QN AUTO: 31.1 PG (ref 26–34)
MCHC RBC AUTO-ENTMCNC: 31.9 G/DL (ref 30–36.5)
MCV RBC AUTO: 97.5 FL (ref 80–99)
NRBC # BLD: 0 K/UL (ref 0–0.01)
NRBC BLD-RTO: 0 PER 100 WBC
PLATELET # BLD AUTO: 248 K/UL (ref 150–400)
PMV BLD AUTO: 11.2 FL (ref 8.9–12.9)
POTASSIUM SERPL-SCNC: 3.2 MMOL/L (ref 3.5–5.1)
POTASSIUM SERPL-SCNC: 3.7 MMOL/L (ref 3.5–5.1)
PROT SERPL-MCNC: 7.3 G/DL (ref 6.4–8.2)
RBC # BLD AUTO: 4.41 M/UL (ref 3.8–5.2)
SERVICE CMNT-IMP: ABNORMAL
SODIUM SERPL-SCNC: 142 MMOL/L (ref 136–145)
SODIUM SERPL-SCNC: 143 MMOL/L (ref 136–145)
WBC # BLD AUTO: 10.6 K/UL (ref 3.6–11)

## 2019-09-11 PROCEDURE — 94760 N-INVAS EAR/PLS OXIMETRY 1: CPT

## 2019-09-11 PROCEDURE — 74011250636 HC RX REV CODE- 250/636: Performed by: HOSPITALIST

## 2019-09-11 PROCEDURE — 74011000250 HC RX REV CODE- 250: Performed by: HOSPITALIST

## 2019-09-11 PROCEDURE — 74011636637 HC RX REV CODE- 636/637: Performed by: FAMILY MEDICINE

## 2019-09-11 PROCEDURE — 97535 SELF CARE MNGMENT TRAINING: CPT

## 2019-09-11 PROCEDURE — 97116 GAIT TRAINING THERAPY: CPT

## 2019-09-11 PROCEDURE — 93005 ELECTROCARDIOGRAM TRACING: CPT

## 2019-09-11 PROCEDURE — 51798 US URINE CAPACITY MEASURE: CPT

## 2019-09-11 PROCEDURE — 36415 COLL VENOUS BLD VENIPUNCTURE: CPT

## 2019-09-11 PROCEDURE — 94640 AIRWAY INHALATION TREATMENT: CPT

## 2019-09-11 PROCEDURE — 74011250637 HC RX REV CODE- 250/637: Performed by: HOSPITALIST

## 2019-09-11 PROCEDURE — 94664 DEMO&/EVAL PT USE INHALER: CPT

## 2019-09-11 PROCEDURE — 65660000000 HC RM CCU STEPDOWN

## 2019-09-11 PROCEDURE — 80053 COMPREHEN METABOLIC PANEL: CPT

## 2019-09-11 PROCEDURE — 83735 ASSAY OF MAGNESIUM: CPT

## 2019-09-11 PROCEDURE — 85027 COMPLETE CBC AUTOMATED: CPT

## 2019-09-11 PROCEDURE — 82962 GLUCOSE BLOOD TEST: CPT

## 2019-09-11 PROCEDURE — 77010033678 HC OXYGEN DAILY

## 2019-09-11 RX ORDER — IPRATROPIUM BROMIDE AND ALBUTEROL SULFATE 2.5; .5 MG/3ML; MG/3ML
3 SOLUTION RESPIRATORY (INHALATION)
Status: DISCONTINUED | OUTPATIENT
Start: 2019-09-11 | End: 2019-09-13 | Stop reason: HOSPADM

## 2019-09-11 RX ORDER — LEVETIRACETAM 500 MG/1
500 TABLET ORAL 2 TIMES DAILY
Qty: 60 TAB | Refills: 0 | Status: SHIPPED | OUTPATIENT
Start: 2019-09-11 | End: 2019-11-06 | Stop reason: SDUPTHER

## 2019-09-11 RX ORDER — POTASSIUM CHLORIDE 750 MG/1
40 TABLET, FILM COATED, EXTENDED RELEASE ORAL EVERY 4 HOURS
Status: DISCONTINUED | OUTPATIENT
Start: 2019-09-11 | End: 2019-09-11 | Stop reason: SDUPTHER

## 2019-09-11 RX ORDER — GUAIFENESIN 100 MG/5ML
81 LIQUID (ML) ORAL DAILY
Qty: 30 TAB | Refills: 0 | Status: SHIPPED | OUTPATIENT
Start: 2019-09-12

## 2019-09-11 RX ORDER — METFORMIN HYDROCHLORIDE 500 MG/1
500 TABLET ORAL 2 TIMES DAILY WITH MEALS
Qty: 60 TAB | Refills: 0 | Status: SHIPPED | OUTPATIENT
Start: 2019-09-11 | End: 2019-11-05 | Stop reason: SDUPTHER

## 2019-09-11 RX ORDER — POTASSIUM CHLORIDE 750 MG/1
40 TABLET, FILM COATED, EXTENDED RELEASE ORAL ONCE
Status: COMPLETED | OUTPATIENT
Start: 2019-09-11 | End: 2019-09-11

## 2019-09-11 RX ORDER — LEVETIRACETAM 500 MG/1
500 TABLET ORAL 2 TIMES DAILY
Status: DISCONTINUED | OUTPATIENT
Start: 2019-09-11 | End: 2019-09-13 | Stop reason: HOSPADM

## 2019-09-11 RX ORDER — METOPROLOL TARTRATE 25 MG/1
25 TABLET, FILM COATED ORAL EVERY 12 HOURS
Status: DISCONTINUED | OUTPATIENT
Start: 2019-09-11 | End: 2019-09-11

## 2019-09-11 RX ADMIN — FUROSEMIDE 40 MG: 10 INJECTION, SOLUTION INTRAMUSCULAR; INTRAVENOUS at 10:49

## 2019-09-11 RX ADMIN — INSULIN LISPRO 2 UNITS: 100 INJECTION, SOLUTION INTRAVENOUS; SUBCUTANEOUS at 05:35

## 2019-09-11 RX ADMIN — LOSARTAN POTASSIUM 25 MG: 25 TABLET ORAL at 10:50

## 2019-09-11 RX ADMIN — Medication 10 ML: at 21:14

## 2019-09-11 RX ADMIN — IPRATROPIUM BROMIDE AND ALBUTEROL SULFATE 3 ML: .5; 3 SOLUTION RESPIRATORY (INHALATION) at 14:00

## 2019-09-11 RX ADMIN — IPRATROPIUM BROMIDE AND ALBUTEROL SULFATE 3 ML: .5; 3 SOLUTION RESPIRATORY (INHALATION) at 21:16

## 2019-09-11 RX ADMIN — BUDESONIDE 500 MCG: 0.5 INHALANT RESPIRATORY (INHALATION) at 21:16

## 2019-09-11 RX ADMIN — Medication 10 ML: at 14:00

## 2019-09-11 RX ADMIN — POTASSIUM CHLORIDE 40 MEQ: 750 TABLET, EXTENDED RELEASE ORAL at 11:07

## 2019-09-11 RX ADMIN — INSULIN LISPRO 2 UNITS: 100 INJECTION, SOLUTION INTRAVENOUS; SUBCUTANEOUS at 13:42

## 2019-09-11 RX ADMIN — FAMOTIDINE 20 MG: 10 INJECTION, SOLUTION INTRAVENOUS at 10:49

## 2019-09-11 RX ADMIN — Medication 10 ML: at 05:36

## 2019-09-11 RX ADMIN — FAMOTIDINE 20 MG: 10 INJECTION, SOLUTION INTRAVENOUS at 21:14

## 2019-09-11 RX ADMIN — LEVETIRACETAM 500 MG: 500 TABLET ORAL at 19:08

## 2019-09-11 RX ADMIN — IPRATROPIUM BROMIDE AND ALBUTEROL SULFATE 3 ML: .5; 3 SOLUTION RESPIRATORY (INHALATION) at 08:18

## 2019-09-11 RX ADMIN — BUDESONIDE 500 MCG: 0.5 INHALANT RESPIRATORY (INHALATION) at 08:18

## 2019-09-11 RX ADMIN — POTASSIUM CHLORIDE 40 MEQ: 750 TABLET, FILM COATED, EXTENDED RELEASE ORAL at 14:49

## 2019-09-11 RX ADMIN — ASPIRIN 81 MG CHEWABLE TABLET 81 MG: 81 TABLET CHEWABLE at 10:49

## 2019-09-11 NOTE — PROGRESS NOTES
Patient listed as not having a primary care physician. Hospital follow-up PCP transitional care appointment has been scheduled with Dr. Timo Munoz for Wednesday, 9/18/19 at 10:30 a.m. Pending patient discharge.   Cynthia Dunlap, Care Management Specialist.

## 2019-09-11 NOTE — PROGRESS NOTES
Pulmonary, Critical Care, and Sleep Medicine~Progress Note    Name: Lyly Samuel MRN: [id number]   : 1960 Hospital: . [address]   Date: 2019 10:26 AM Admission: 2019     Impression Plan   1. Acute on chronic hypercapnic resp failure  2. Seizure disorder  3. S/p tPA for concerns of CVA  4. 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 Active smoker at admission; possibly has obstructive lung disease   6. Obesity  1. Duonebs/brovana/pulmicort; recommend stiolto at discharge  2. On lasix  3. Will need follow up in pulmonary clinic in 1-2 wks with PFTs and sleep clinic. We will help arrange it  4. O2 titration above 90%  5. NIV at night while here. Daily Progression:    Sitting up in chair. Breathing better     I have reviewed the labs and previous days notes. Pertinent items are noted in HPI. OBJECTIVE:     Vital Signs:       Visit Vitals  /59 (BP 1 Location: Left arm, BP Patient Position: At rest;Sitting)   Pulse 77   Temp 98.4 °F (36.9 °C)   Resp 18   Ht 5' 3\" (1.6 m)   Wt 137.9 kg (304 lb)   SpO2 98%   BMI 53.85 kg/m²      Temp (24hrs), Av.4 °F (36.9 °C), Min:97.9 °F (36.6 °C), Max:98.8 °F (37.1 °C)     Intake/Output:     Last shift: No intake/output data recorded.     Last 3 shifts:  1901 -  0700  In: 1851.3 [I.V.:1851.3]  Out: 4250 [Urine:4250]          Intake/Output Summary (Last 24 hours) at 2019 1026  Last data filed at 2019 0403  Gross per 24 hour   Intake 1201.25 ml   Output 1150 ml   Net 51.25 ml       Physical Exam:                                        Exam Findings Other   General: No resp distress noted, appears stated age    HEENT:  No ulcers, JVD not elevated, no cervical LAD    Chest: No pectus deformity, normal chest rise b/l    HEART:  RRR, no murmurs/rubs/gallops    Lungs:  CTA b/l, no rhonchi/crackles/wheeze, diminished BS at bases    ABD: Soft/NT, non rigid mildly distended    EXT: No cyanosis/clubbing/edema, normal peripheral pulses    Skin: No rashes or ulcers, no mottling    Neuro: A/O x 3        Medications:  Current Facility-Administered Medications   Medication Dose Route Frequency    albuterol-ipratropium (DUO-NEB) 2.5 MG-0.5 MG/3 ML  3 mL Nebulization TID RT    potassium chloride SR (KLOR-CON 10) tablet 40 mEq  40 mEq Oral Q4H    0.45% sodium chloride infusion  75 mL/hr IntraVENous CONTINUOUS    losartan (COZAAR) tablet 25 mg  25 mg Oral DAILY    arformoterol (BROVANA) neb solution 15 mcg  15 mcg Nebulization BID RT    And    budesonide (PULMICORT) 500 mcg/2 ml nebulizer suspension  500 mcg Nebulization BID RT    aspirin chewable tablet 81 mg  81 mg Oral DAILY    furosemide (LASIX) injection 40 mg  40 mg IntraVENous DAILY    famotidine (PF) (PEPCID) 20 mg in sodium chloride 0.9% 10 mL injection  20 mg IntraVENous Q12H    fentaNYL citrate (PF) injection 25 mcg  25 mcg IntraVENous Q4H PRN    sodium chloride (NS) flush 5-40 mL  5-40 mL IntraVENous Q8H    sodium chloride (NS) flush 5-40 mL  5-40 mL IntraVENous PRN    bisacodyl (DULCOLAX) suppository 10 mg  10 mg Rectal DAILY PRN    LORazepam (ATIVAN) injection 2 mg  2 mg IntraVENous Q2H PRN    glucose chewable tablet 16 g  4 Tab Oral PRN    glucagon (GLUCAGEN) injection 1 mg  1 mg IntraMUSCular PRN    insulin lispro (HUMALOG) injection   SubCUTAneous Q6H    dextrose 10 % infusion 125-250 mL  125-250 mL IntraVENous PRN       Labs:  ABG Recent Labs     09/10/19  0412   PHI 7.342*   PCO2I 54.7*   PO2I 80   HCO3I 29.6*   SO2I 95   FIO2I 35        CBC Recent Labs     09/11/19  0334 09/10/19  0618 09/09/19  0605   WBC 10.6 8.4 9.3   HGB 13.7 12.0 10.9*   HCT 43.0 38.1 35.0    213 191   MCV 97.5 98.7 99.2*   MCH 31.1 31.1 54.5        Metabolic  Panel Recent Labs     09/11/19  0334 09/10/19  0618 09/09/19  1242 09/09/19  0605    146* 146* 148*   K 3.2* 3.9 3.7 3.7    109* 108 112*   CO2 29 30 32 29   * 131* 139* 120*   BUN 18 8 9 10   CREA 1.03* 0.75 0.86 0.81   CA 9.5 8.8 8.6 8.3*   ALB 3.4*  --  3.4* 2.9*   SGOT 30  --  30 18   ALT 39  --  31 22        Pertinent Labs                Manuelito Winters, [address]  9/11/2019

## 2019-09-11 NOTE — PROGRESS NOTES
Bedside shift change report given to Florian Pillai RN (oncoming nurse) by Dmitriy Bueno RN (offgoing nurse). Report included the following information SBAR, Kardex, MAR, Accordion, Recent Results and Cardiac Rhythm NSR.

## 2019-09-11 NOTE — WOUND CARE
WOCN Note:     New consult for breasts, groin, pannus, sacrum. Chart shows:  Admitted for seizures and resp failure  History of CVA    Assessment:   A&O x 4, continent and mobile - ambulating from bathroom back to recliner using walker. Wearing her own underwear. Bed: bariatric with air  She reports no pain. MASD to inner thighs, labia, and groin with slight redness and dampness with sweating. No redness noted under breasts. Upper gluteal cleft intact with chaffed, red, lines on either buttocks in \"kissing\" pattern.      Recommendations:    Barrier cream to buttocks, groin, and inner thighs daily and as needed    Transition of Care: Plan to follow weekly and as needed while admitted to hospital.     JACQUELIN Snyder, RN, [facility]  Certified Wound, Ostomy, Continence Nurse  office [phone]  pager [phone] or call  to page

## 2019-09-11 NOTE — PROGRESS NOTES
Problem: Falls - Risk of  Goal: *Absence of Falls  Outcome: Progressing Towards Goal  Note:   Fall Risk Interventions:  Mobility Interventions: Patient to call before getting OOB, Communicate number of staff needed for ambulation/transfer, PT Consult for mobility concerns, Utilize walker, cane, or other assistive device    Mentation Interventions: Adequate sleep, hydration, pain control, Door open when patient unattended, Evaluate medications/consider consulting pharmacy, Increase mobility, Room close to nurse's station, Toileting rounds    Medication Interventions: Patient to call before getting OOB, Teach patient to arise slowly    Elimination Interventions: Call light in reach, Patient to call for help with toileting needs, Toilet paper/wipes in reach, Toileting schedule/hourly rounds    History of Falls Interventions: Door open when patient unattended, Evaluate medications/consider consulting pharmacy, Investigate reason for fall, Vital signs minimum Q4HRs X 24 hrs (comment for end date)         Problem: Pressure Injury - Risk of  Goal: *Prevention of pressure injury  Description  Document Philip Scale and appropriate interventions in the flowsheet. Outcome: Progressing Towards Goal  Note:   Pressure Injury Interventions:  Sensory Interventions: Assess need for specialty bed, Chair cushion, Discuss PT/OT consult with provider, Float heels, Keep linens dry and wrinkle-free, Minimize linen layers, Turn and reposition approx. every two hours (pillows and wedges if needed)     Activity Interventions: Assess need for specialty bed, Increase time out of bed, Pressure redistribution bed/mattress(bed type), PT/OT evaluation     Nutrition Interventions: Document food/fluid/supplement intake    Pt on turn team, bilateral heel boots applied, pt remained up in chair for majority of the afternoon. 1900-Pt states that she hears people whispers and laughing behind her curtain in her room.  RN at bedside, there are no whisper or laughing. Xuan Lozoya MD notified. 2000-Bedside shift change report given to Addie Peters (oncoming nurse) by Jose L Chung RN and Estevan Devlin (offgoing nurse). Report included the following information SBAR, Kardex, ED Summary, Intake/Output, MAR, Accordion, Recent Results, Med Rec Status and Cardiac Rhythm NSR .

## 2019-09-11 NOTE — ANCILLARY DISCHARGE INSTRUCTIONS
[address]       9/11/2019      RE: Tanvir Melgoza      To Whom it May Concern: This is to certify that Tanvir Melgoza was admitted to hospital on 9/5/2019   to 9/11/2019. She may return to work after a week. Thank you for your assistance in this matter.     Sincerely,      Jens Collins MD  Adult Hospitalist  [address] [facility] [place] [phone]

## 2019-09-11 NOTE — PROGRESS NOTES
1539: Pt had 14 beat run of Vtach. VSS, pt asymptomatic. Terence El MD notified. Per MD, monitor pt for two hours and then continue with discharge. No additional orders received. Will continue to monitor. 1600: Pt had 30 beat run of Vtach. VSS, pt stated that she felt dizzy and spaced out during this episode. Pt returned to bed from chair. EKG obtained. Terence El MD notified. Order received for cardiology consult, BMP and magnesium lab, and to discontinue discharge order. Will continue to monitor closely. 1653: Pt standing up in middle of room experiencing audible hallucinations. Pt states that she hears fighting and hitting going on outside of her room. Terence El MD at bedside. Pt tearful. Pt returned to chair. Bed/chair alarm placed. Will continue to monitor. 1701: Pt had 23 beat run of Vtach. VSS, pt stated that she again felt dizzy and spaced out during this episode. Kuldeep Dempsey MD notified. No new orders received. Will continue to monitor.     I have reviewed and agree with the documentation and assessment of Barbara Hendrickson RN

## 2019-09-11 NOTE — PROGRESS NOTES
Hospitalist Progress Note  Shaun Dickey MD  Answering service: [phone] from in house phone  Cell: [phone]      Date of Service:  2019  NAME:  Malathi Elena  :  1960  MRN:  [id number]      Admission Summary:     A 77-year-old white female with past medical history of COPD, obstructive sleep apnea, hypertension, morbid obesity, peripheral edema, presented to the emergency department via EMS with reported seizures and altered mental status. Interval history / Subjective:     She said she feels better, she has memory issue,      Assessment & Plan:     Acute on chronic hypoxic hypercapnic respiratory failure due to acute COPD exacerbation, obesity hypoventilation and possible CORWIN   -continue pulmicort, brovana, duo neb, oxygen support   -chest x ray on  unchanged mild pulmonary edema. New left basilar atelectasis and small left pleural effusion. -patient home oxygen dependent 2 l/m  -monitor pulse ox to wean off oxygen to a saturation of >90%     Acute COPD exacerbation   -improving, continue on duo neb, pulmicort and brovana, monitor pulse ox  -home oxygen dependent     Possible Seizure  -no seizure since admission  -not started on seizure medication   -EEG there are no focal abnormalities, epileptiform discharges, or electrographic seizures seen. abnormal eeg due to diffuse beta activity  -discussed with neurologist, start keppra 500 mg po bid and outpatient follow up    Suspected acute CVA, left sided neglect s/p tpA  -continue on aspirin   -CT head no acute intracranial process identified  -CTA head no evidence of large vessel occlusion or perfusion abnormality, no hemodynamically significant carotid artery stenosis.     Acute encephalopathy POA  -improved, patient is conscious and alert, has on and off confusion and forgetful  -no leukocytosis, fever  -alcohol <10  -continue neuro check and supportive care    HTN  -BP normal, continue on losartan, monitor BP    T2DM new diagnosis  -A1c 7.2  -finger stick glucose 145- 172, continue sliding scale and monitor finger stick glucose    Hypokalemia   -resolved    Hypernatremia   -improved    HTN  -BP not at goal, continue losartan, monitor BP    Hx of lower extremities edema  -continue on lasix    Hx of CORWIN  -CPAP q hs    Nonsustained VT   -feels dizziness , no chest pain  -ekg normal sinus vent rate 87 bpm non specific st t wave long   -check electrolyte   -she said her father passed away at age of 52 due to massive MI  -consult cardiologist         Code status: Full Code  DVT prophylaxis: SCD    Care Plan discussed with: Patient/Family and Nurse  Disposition: home with home health  I called and updated her sister, Alvaro Savannah at , questions answered     Hospital Problems  Never Reviewed          Codes Class Noted POA    Seizure ([facility] ICD-10-CM: R56.9  ICD-9-CM: 780.39  9/5/2019 Unknown        Altered mental status ICD-10-CM: R41.82  ICD-9-CM: 780.97  9/5/2019 Unknown        Elevated serum creatinine ICD-10-CM: R79.89  ICD-9-CM: 790.99  9/5/2019 Unknown        Left-sided weakness ICD-10-CM: R53.1  ICD-9-CM: 728.87  9/5/2019 Unknown        Uncontrolled hypertension ICD-10-CM: I10  ICD-9-CM: 401.9  9/5/2019 Unknown              Vital Signs:    Last 24hrs VS reviewed since prior progress note. Most recent are:  Visit Vitals  /40   Pulse 86   Temp 98.4 °F (36.9 °C)   Resp 18   Ht 5' 3\" (1.6 m)   Wt 137.9 kg (304 lb)   SpO2 96%   BMI 53.85 kg/m²         Intake/Output Summary (Last 24 hours) at 9/11/2019 1231  Last data filed at 9/11/2019 1036  Gross per 24 hour   Intake 1001.25 ml   Output 100 ml   Net 901.25 ml        Physical Examination:             Constitutional:  No acute distress, cooperative, pleasant    ENT:  Oral mucous moist, oropharynx benign. Neck supple,    Resp:  CTA bilaterally. No wheezing/rhonchi/rales.  No accessory muscle use   CV: Regular rhythm, normal rate, no murmurs, gallops, rubs    GI:  Soft, non distended, non tender. normoactive bowel sounds, no hepatosplenomegaly     Musculoskeletal:  No edema    Neurologic:  Conscious and alert, oriented to place, time and person, moves all extremities, CN II-XII reviewed     Skin:  Good turgor, no rashes or ulcers       Data Review:    Review and/or order of clinical lab test  Review and/or order of tests in the radiology section of CPT  Review and/or order of tests in the medicine section of CPT      Labs:     Recent Labs     09/11/19  0334 09/10/19  0618   WBC 10.6 8.4   HGB 13.7 12.0   HCT 43.0 38.1    213     Recent Labs     09/11/19  0334 09/10/19  0618 09/09/19  1242    146* 146*   K 3.2* 3.9 3.7    109* 108   CO2 29 30 32   BUN 18 8 9   CREA 1.03* 0.75 0.86   * 131* 139*   CA 9.5 8.8 8.6     Recent Labs     09/11/19 0334 09/09/19  1242 09/09/19  0605   SGOT 30 30 18   ALT 39 31 22   AP 60 58 50   TBILI 0.8 0.8 0.5   TP 7.3 7.0 6.0*   ALB 3.4* 3.4* 2.9*   GLOB 3.9 3.6 3.1     No results for input(s): INR, PTP, APTT in the last 72 hours. No lab exists for component: INREXT, INREXT   No results for input(s): FE, TIBC, PSAT, FERR in the last 72 hours. No results found for: FOL, RBCF   No results for input(s): PH, PCO2, PO2 in the last 72 hours. No results for input(s): CPK, CKNDX, TROIQ in the last 72 hours.     No lab exists for component: CPKMB  Lab Results   Component Value Date/Time    Cholesterol, total 175 09/06/2019 08:56 AM    HDL Cholesterol 39 09/06/2019 08:56 AM    LDL, calculated 97.8 09/06/2019 08:56 AM    Triglyceride 191 (H) 09/06/2019 08:56 AM    CHOL/HDL Ratio 4.5 09/06/2019 08:56 AM     Lab Results   Component Value Date/Time    Glucose (POC) 145 (H) 09/11/2019 05:32 AM    Glucose (POC) 135 (H) 09/10/2019 11:37 PM    Glucose (POC) 150 (H) 09/10/2019 09:00 PM    Glucose (POC) 172 (H) 09/10/2019 06:27 PM    Glucose (POC) 136 (H) 09/10/2019 11:58 AM No results found for: COLOR, APPRN, SPGRU, REFSG, RAMIRO, PROTU, GLUCU, KETU, BILU, UROU, DEAN, LEUKU, GLUKE, EPSU, BACTU, WBCU, RBCU, CASTS, UCRY      Medications Reviewed:     Current Facility-Administered Medications   Medication Dose Route Frequency    albuterol-ipratropium (DUO-NEB) 2.5 MG-0.5 MG/3 ML  3 mL Nebulization TID RT    potassium chloride SR (KLOR-CON 10) tablet 40 mEq  40 mEq Oral Q4H    0.45% sodium chloride infusion  75 mL/hr IntraVENous CONTINUOUS    losartan (COZAAR) tablet 25 mg  25 mg Oral DAILY    arformoterol (BROVANA) neb solution 15 mcg  15 mcg Nebulization BID RT    And    budesonide (PULMICORT) 500 mcg/2 ml nebulizer suspension  500 mcg Nebulization BID RT    aspirin chewable tablet 81 mg  81 mg Oral DAILY    furosemide (LASIX) injection 40 mg  40 mg IntraVENous DAILY    famotidine (PF) (PEPCID) 20 mg in sodium chloride 0.9% 10 mL injection  20 mg IntraVENous Q12H    fentaNYL citrate (PF) injection 25 mcg  25 mcg IntraVENous Q4H PRN    sodium chloride (NS) flush 5-40 mL  5-40 mL IntraVENous Q8H    sodium chloride (NS) flush 5-40 mL  5-40 mL IntraVENous PRN    bisacodyl (DULCOLAX) suppository 10 mg  10 mg Rectal DAILY PRN    LORazepam (ATIVAN) injection 2 mg  2 mg IntraVENous Q2H PRN    glucose chewable tablet 16 g  4 Tab Oral PRN    glucagon (GLUCAGEN) injection 1 mg  1 mg IntraMUSCular PRN    insulin lispro (HUMALOG) injection   SubCUTAneous Q6H    dextrose 10 % infusion 125-250 mL  125-250 mL IntraVENous PRN     ______________________________________________________________________  EXPECTED LENGTH OF STAY: 5d 0h  ACTUAL LENGTH OF STAY:          6                 Kelly Ndiaye MD

## 2019-09-11 NOTE — PROGRESS NOTES
Problem: Non-Violent Restraints  Goal: *Removal from restraints as soon as assessed to be safe  Outcome: Progressing Towards Goal  Note:   Patient is not in restraints and is calm and cooperative     Problem: Falls - Risk of  Goal: *Absence of Falls  Description  Document Carlos Rutherford Fall Risk and appropriate interventions in the flowsheet. Outcome: Progressing Towards Goal  Note:   Fall Risk Interventions:  Bed is in the lowest position and wheels are locked, call bell is within reach, bathroom light is on during evening hours, gripper socks are on and patient has been instructed to call out for assistance if needed. As of now, patient is free from falls and will continue to be monitored. '     Problem: Seizure Disorder (Adult)  Goal: *STG: Remains free of seizure activity  Outcome: Progressing Towards Goal  Note:   Patient has not experienced any other seizures this admission. Goal: *STG: Remains safe in hospital  Outcome: Progressing Towards Goal  Note:   Bed is in the lowest position and wheels are locked, call bell is within reach, bathroom light is on during evening hours, gripper socks are on and patient has been instructed to call out for assistance if needed. As of now, patient is free from falls and will continue to be monitored.

## 2019-09-11 NOTE — CONSULTS
Cardiac Electrophysiology Hospital Consultation Note   REFERRING PROVIDER: Dr Inocente Valero  Subjective:      Gianfranco Bueno is a 64 y.o. patient who is seen for evaluation of monomorphic ventricular tachycardia  She had several runs up to 30 beats and she was dizzy  I spoke to her and her nurses and they said she was about to be discharged when this happened  She has COPD and is on home O2 but did not have CAD or heart disease before  Her nurse took her off oxygen before discharge but she dropped her O2 so he put her back on for 2 hours before these runs of VT happened  This am she had potassium 3.2  She said she had no chest pain   + chronic leg edema and dyspnea and said her doctor had told her she has large heart but stress test several years ago was negative and she had an ECG done in a cardiologist's office that she could not remember his name    Echo 9/5/2019 normal LVEF, mild AR      Patient Active Problem List   Diagnosis Code    [facility] R56.9    Altered mental status R41.82    Elevated serum creatinine R79.89    Left-sided weakness R53.1    Uncontrolled hypertension I10     Current Facility-Administered Medications   Medication Dose Route Frequency Provider Last Rate Last Dose    albuterol-ipratropium (DUO-NEB) 2.5 MG-0.5 MG/3 ML  3 mL Nebulization TID RT Sarina Odom MD   3 mL at 09/11/19 1400    levETIRAcetam (KEPPRA) tablet 500 mg  500 mg Oral BID Sarina Odom MD        losartan (COZAAR) tablet 25 mg  25 mg Oral DAILY Sarina Odom MD   25 mg at 09/11/19 1050    arformoterol (BROVANA) neb solution 15 mcg  15 mcg Nebulization BID RT Amanda Rodriguez MD   Stopped at 09/09/19 2100    And    budesonide (PULMICORT) 500 mcg/2 ml nebulizer suspension  500 mcg Nebulization BID RT Amanda Rodriguez MD   500 mcg at 09/11/19 0818    aspirin chewable tablet 81 mg  81 mg Oral DAILY Amanda Rodriguez MD   81 mg at 09/11/19 1049    furosemide (LASIX) injection 40 mg  40 mg IntraVENous DAILY Maddie Paul MD   40 mg at 19 1049    famotidine (PF) (PEPCID) 20 mg in sodium chloride 0.9% 10 mL injection  20 mg IntraVENous Q12H Maddie Paul MD   20 mg at 19 1049    fentaNYL citrate (PF) injection 25 mcg  25 mcg IntraVENous Q4H PRN Parth Dobson MD        sodium chloride (NS) flush 5-40 mL  5-40 mL IntraVENous Q8H Sury Watkins MD   10 mL at 19 1400    sodium chloride (NS) flush 5-40 mL  5-40 mL IntraVENous PRN Sury Watkins MD        bisacodyl (DULCOLAX) suppository 10 mg  10 mg Rectal DAILY PRN Sury Watkins MD        LORazepam (ATIVAN) injection 2 mg  2 mg IntraVENous Q2H PRN Bhaskar Painting MD   2 mg at 19 1048    glucose chewable tablet 16 g  4 Tab Oral PRN Sury Watkins MD        glucagon (GLUCAGEN) injection 1 mg  1 mg IntraMUSCular PRN Sury Watkins MD        insulin lispro (HUMALOG) injection   SubCUTAneous Q6H Sury Watkins MD   2 Units at 19 1342    dextrose 10 % infusion 125-250 mL  125-250 mL IntraVENous PRN Sury Watkins MD         Allergies   Allergen Reactions    Pcn [Penicillins] Unknown (comments)     Past Medical History:   Diagnosis Date    Chronic obstructive pulmonary disease ([facility]     Hypertension      No past surgical history on file. Father had MI and  at age 52  Social History     Tobacco Use    Smoking status: Former Smoker    Smokeless tobacco: Never Used   Substance Use Topics    Alcohol use: Not on file          Review of Systems:   Constitutional: Negative for fever, chills, weight loss, + malaise/fatigue. HEENT: Negative for nosebleeds, vision changes. Respiratory: Negative for cough, hemoptysis  Cardiovascular: Negative for chest pain, palpitations, orthopnea, claudication, + leg swelling, no syncope, and PND. Gastrointestinal: Negative for nausea, vomiting, diarrhea, blood in stool and melena. Genitourinary: Negative for dysuria, and hematuria.    Musculoskeletal: Negative for myalgias, + arthralgia. Skin: Negative for rash. Heme: Does not bleed or bruise easily. Neurological: Negative for speech change and focal weakness     Objective:     Visit Vitals  /68 (BP 1 Location: Right arm, BP Patient Position: At rest)   Pulse 93   Temp 98.1 °F (36.7 °C)   Resp 27   Ht 5' 3\" (1.6 m)   Wt 304 lb (137.9 kg)   SpO2 96%   BMI 53.85 kg/m²      Physical Exam:   Constitutional: well-developed and well-nourished. No respiratory distress. Head: Normocephalic and atraumatic. Eyes: Pupils are equal, round. xanthelasma  ENT: hearing normal  Neck: supple. No JVD present. Cardiovascular: Normal rate, regular rhythm. Exam reveals no gallop and no friction rub. No murmur heard. Pulmonary/Chest: Effort normal and breath sounds normal. No wheezes. Abdominal: Soft, morbidly obese  Musculoskeletal: trace edema. Neurological: alert,oriented. Skin: Skin is warm and dry  Psychiatric: normal mood and affect.  Behavior is normal. Judgment and thought content normal.      BMP:   Lab Results   Component Value Date/Time     09/11/2019 03:34 AM    K 3.2 (L) 09/11/2019 03:34 AM     09/11/2019 03:34 AM    CO2 29 09/11/2019 03:34 AM    AGAP 10 09/11/2019 03:34 AM     (H) 09/11/2019 03:34 AM    BUN 18 09/11/2019 03:34 AM    CREA 1.03 (H) 09/11/2019 03:34 AM    GFRAA >60 09/11/2019 03:34 AM    GFRNA 55 (L) 09/11/2019 03:34 AM     CBC:   Lab Results   Component Value Date/Time    WBC 10.6 09/11/2019 03:34 AM    HGB 13.7 09/11/2019 03:34 AM    HCT 43.0 09/11/2019 03:34 AM     09/11/2019 03:34 AM        Assessment/Plan:   Monomorphic VT episodes associated with dizziness  COPD on oxygen  Morbid obesity  Mild aortic regurgitation  Hypertension  Seizure disorder  hypokalemia   DM2    Replace potassium  Monitor for more VT  I discussed and recommend work up for ischemia but with COPD and weight she would have a hard time with 2 day nuclear stress test  She agrees to cardiac cath  Will check on schedule in the morning  She agrees to proceed  Keep NPO after midnight    Thank you for involving me in this patient's care and please call with further concerns or questions. eFrnanda Dowd M.D.   Electrophysiology/Cardiology  [facility]  [address] [place] [address]                                [phone]

## 2019-09-11 NOTE — PROGRESS NOTES
Neurology Progress Note     NAME: Sg Buckner   :  1960   MRN:  [id number]   DATE:  2019    Assessment:     Active Problems:    Seizure ([address] (2019)      Altered mental status (2019)      Elevated serum creatinine (2019)      Left-sided weakness (2019)      Uncontrolled hypertension (2019)      Pt is a 62yo female who presented 19 with witnessed seizure x 2, given versed by EMS, , , O2 sat 78%. She reportedly had right gaze pref and left sided weakness in ED. Normal CTH, CTA H/N. Received IV TPA. Given Keppra 2000mg IV. MRI brain neg for stroke or epileptogenic focus.  Initial EEG with changes c/w propofol. Pt denies h/o seizure, h/o concussion as a child, no family h/o epilepsy, no h/o CNS infection. Repeat EEG 9/10/19 with right > left temporal slowing and right FT sharp waves. Findings are concerning for underlying epileptogenic focus given presentation. Plan:   -Discussed EEG findings, recommend she start Keppra 500mg bid  -No driving x 6 months  -F/u in clinic with me at next available,  or Feb  -Agreed to completed [place] paperwork for pt. Would like accommodation to work at home a few days a week while she is unable to drive    Subjective:   Pt is doing well. Currently having a breathing tx and receiving diabetic education. No spells or seizures since admission. Friend notes pt is having some memory impairment still.       Objective:   Chart reviewed since last seen    Current Facility-Administered Medications   Medication Dose Route Frequency    albuterol-ipratropium (DUO-NEB) 2.5 MG-0.5 MG/3 ML  3 mL Nebulization TID RT    potassium chloride SR (KLOR-CON 10) tablet 40 mEq  40 mEq Oral Q4H    levETIRAcetam (KEPPRA) tablet 500 mg  500 mg Oral BID    losartan (COZAAR) tablet 25 mg  25 mg Oral DAILY    arformoterol (BROVANA) neb solution 15 mcg  15 mcg Nebulization BID RT    And    budesonide (PULMICORT) 500 mcg/2 ml nebulizer suspension  500 mcg Nebulization BID RT    aspirin chewable tablet 81 mg  81 mg Oral DAILY    furosemide (LASIX) injection 40 mg  40 mg IntraVENous DAILY    famotidine (PF) (PEPCID) 20 mg in sodium chloride 0.9% 10 mL injection  20 mg IntraVENous Q12H    fentaNYL citrate (PF) injection 25 mcg  25 mcg IntraVENous Q4H PRN    sodium chloride (NS) flush 5-40 mL  5-40 mL IntraVENous Q8H    sodium chloride (NS) flush 5-40 mL  5-40 mL IntraVENous PRN    bisacodyl (DULCOLAX) suppository 10 mg  10 mg Rectal DAILY PRN    LORazepam (ATIVAN) injection 2 mg  2 mg IntraVENous Q2H PRN    glucose chewable tablet 16 g  4 Tab Oral PRN    glucagon (GLUCAGEN) injection 1 mg  1 mg IntraMUSCular PRN    insulin lispro (HUMALOG) injection   SubCUTAneous Q6H    dextrose 10 % infusion 125-250 mL  125-250 mL IntraVENous PRN       Visit Vitals  /40   Pulse 86   Temp 98.4 °F (36.9 °C)   Resp 18   Ht 5' 3\" (1.6 m)   Wt 137.9 kg (304 lb)   SpO2 98%   BMI 53.85 kg/m²     Temp (24hrs), Av.3 °F (36.8 °C), Min:97.9 °F (36.6 °C), Max:98.8 °F (37.1 °C)      701 - 1900  In: -   Out: 100 [Urine:100]  1901 -  0700  In: 1851.3 [I.V.:1851.3]  Out: 4250 [Urine:4250]      Physical Exam:  General: Well developed well nourished patient in no apparent distress. Neurological Exam:  Mental Status: Oriented to time, place and person. Speech and language intact. Attention and fund of knowledge appropriate. Cranial Nerves:   EOMI, no nystagmus, no ptosis. Facial movement is symmetric. Hearing is intact.    Motor:     Reflexes:      Sensory:      Gait:     Cerebellar:           Lab Review   Recent Results (from the past 24 hour(s))   GLUCOSE, POC    Collection Time: 09/10/19  6:27 PM   Result Value Ref Range    Glucose (POC) 172 (H) 65 - 100 mg/dL    Performed by Desmond Garcia GLUCOSE, POC    Collection Time: 09/10/19  9:00 PM   Result Value Ref Range    Glucose (POC) 150 (H) 65 - 100 mg/dL    Performed by Aliyah Vences, POC    Collection Time: 09/10/19 11:37 PM   Result Value Ref Range    Glucose (POC) 135 (H) 65 - 100 mg/dL    Performed by Lucien Robin    CBC W/O DIFF    Collection Time: 09/11/19  3:34 AM   Result Value Ref Range    WBC 10.6 3.6 - 11.0 K/uL    RBC 4.41 3.80 - 5.20 M/uL    HGB 13.7 11.5 - 16.0 g/dL    HCT 43.0 35.0 - 47.0 %    MCV 97.5 80.0 - 99.0 FL    MCH 31.1 26.0 - 34.0 PG    MCHC 31.9 30.0 - 36.5 g/dL    RDW 12.5 11.5 - 14.5 %    PLATELET 323 076 - 540 K/uL    MPV 11.2 8.9 - 12.9 FL    NRBC 0.0 0  WBC    ABSOLUTE NRBC 0.00 0.00 - 1.31 K/uL   METABOLIC PANEL, COMPREHENSIVE    Collection Time: 09/11/19  3:34 AM   Result Value Ref Range    Sodium 143 136 - 145 mmol/L    Potassium 3.2 (L) 3.5 - 5.1 mmol/L    Chloride 104 97 - 108 mmol/L    CO2 29 21 - 32 mmol/L    Anion gap 10 5 - 15 mmol/L    Glucose 143 (H) 65 - 100 mg/dL    BUN 18 6 - 20 MG/DL    Creatinine 1.03 (H) 0.55 - 1.02 MG/DL    BUN/Creatinine ratio 17 12 - 20      GFR est AA >60 >60 ml/min/1.73m2    GFR est non-AA 55 (L) >60 ml/min/1.73m2    Calcium 9.5 8.5 - 10.1 MG/DL    Bilirubin, total 0.8 0.2 - 1.0 MG/DL    ALT (SGPT) 39 12 - 78 U/L    AST (SGOT) 30 15 - 37 U/L    Alk. phosphatase 60 45 - 117 U/L    Protein, total 7.3 6.4 - 8.2 g/dL    Albumin 3.4 (L) 3.5 - 5.0 g/dL    Globulin 3.9 2.0 - 4.0 g/dL    A-G Ratio 0.9 (L) 1.1 - 2.2     GLUCOSE, POC    Collection Time: 09/11/19  5:32 AM   Result Value Ref Range    Glucose (POC) 145 (H) 65 - 100 mg/dL    Performed by Ace Dance    GLUCOSE, POC    Collection Time: 09/11/19 12:40 PM   Result Value Ref Range    Glucose (POC) 156 (H) 65 - 100 mg/dL    Performed by Coty Marie        Additional comments:  I have reviewed the patient's new clinical lab test results.   I have personally reviewed the patient's radiographs. MRI   MRI Results (most recent):  Results from [place] encounter on 09/05/19   MRI BRAIN WO CONT    Narrative BRAIN MRI WITHOUT CONTRAST: 9/6/2019 9:55 PM    INDICATION: Focal neuro deficit, new, fixed or worsening, 3-24 hours. Left  extremity weakness. COMPARISON: CT head 9/5/2019, CTA head and neck and CT perfusion 9/5/2019. TECHNIQUE: Images were obtained in multiple planes and sequences to emphasize  T1, T2, and T2* information. In addition, diffusion-weighted images and ADC maps  were also obtained. FINDINGS: The ventricles and sulci are appropriate for age. The main  intracranial flow-voids are normal. Scattered periventricular and subcortical  white matter signal abnormalities are consistent with chronic small vessel  ischemic disease. No restricted diffusion to suggest acute infarction. No  hemorrhage. Paranasal sinus mucosal disease is mild. There are bilateral mastoid  effusions. The examination is mildly motion limited. Impression IMPRESSION: No acute intracranial abnormality. Care Plan discussed with:  Patient x   Family    Friend x   Care Manager    Hopialist:  x     Signed: Stu Rincon MD

## 2019-09-11 NOTE — PROGRESS NOTES
CM noted home health consult and met with patient. Patient is set for dsicharge home today. CM provided [place] [facility]. Patient has elected [address]. CM completed referral via [employer]-awaiting a response. CM to monitor. Parris Esquivel MS    3:50 Referral sent to [facility]. Patient does not have a PCP. CM met with patient who has consented to receiving assistance from care management specialist for a new PCP appt. CM completed referral to CM specialist. CM to monitor. Join Gaffney MS    4:19 pm CM noted new PCP appt. [address] [place] [address] will follow up with PCP after appt on 9/18/19 regarding home health orders for nursing, PT and OT.      Parris Esquivel, MS

## 2019-09-11 NOTE — PROGRESS NOTES
Problem: Mobility Impaired (Adult and Pediatric)  Goal: *Acute Goals and Plan of Care (Insert Text)  Description  FUNCTIONAL STATUS PRIOR TO ADMISSION: Patient was independent and active without use of DME. Wears 2L O2 at baseline. Denies falls. HOME SUPPORT PRIOR TO ADMISSION: The patient lived with 4 roommates but did not require assist. Works full time. Physical Therapy Goals  Initiated 9/10/2019  1. Patient will move from supine to sit and sit to supine , scoot up and down and roll side to side in bed with modified independence within 7 day(s). 2.  Patient will transfer from bed to chair and chair to bed with modified independence using the least restrictive device within 7 day(s). 3.  Patient will perform sit to stand with modified independence within 7 day(s). 4.  Patient will ambulate with modified independence for 150 feet with the least restrictive device within 7 day(s). 5.  Patient will ascend/descend 3 stairs with handrail(s) with modified independence within 7 day(s). Outcome: Progressing Towards Goal   PHYSICAL THERAPY TREATMENT  Patient: Mauricio Linder (89 y.o. female)  Date: 9/11/2019  Diagnosis: Altered mental status [R41.82]  Seizure ([address] [R56.9]  Uncontrolled hypertension [I10]  Elevated serum creatinine [R79.89]  Left-sided weakness [R53.1]   Precautions: Fall  Chart, physical therapy assessment, plan of care and goals were reviewed. ASSESSMENT  Based on the objective data described below, patient presents with good progress towards goals. Today, ambulated 200 ft with stable vitals () and on 2L O2. No LOB or instability when using RW. Plan to progress away from device if possible next session, although patient does endorse weakness in LEs. Encouraged patient to ambulate with nursing one additional time this evening.      Current Level of Function Impacting Discharge (mobility/balance): Supervision-SBA    Other factors to consider for discharge: previously independent, has roommates but not requiring assist and was working full time         PLAN :  Patient continues to benefit from skilled intervention to address the above impairments. Continue treatment per established plan of care. to address goals. Recommendation for discharge: (in order for the patient to meet his/her long term goals)  Physical therapy at least 2 days/week in the home     This discharge recommendation:  Has not yet been discussed the attending provider and/or case management    Equipment recommendations for successful discharge (if) home: may need RW if unable to progress        SUBJECTIVE:   Patient stated Tabatha Lynnester can move my legs around.     OBJECTIVE DATA SUMMARY:   Critical Behavior:  Neurologic State: Alert  Orientation Level: Oriented X4  Cognition: Follows commands, Decreased attention/concentration  Safety/Judgement: Awareness of environment  Functional Mobility Training:  Transfers:  Sit to Stand: Supervision  Stand to Sit: Supervision  Balance:  Sitting: Intact  Sitting - Static: Good (unsupported)  Standing: Intact; With support  Standing - Static: Good  Standing - Dynamic : Good  Ambulation/Gait Training:  Distance (ft): 200 Feet (ft)  Assistive Device: Gait belt;Walker, rolling  Ambulation - Level of Assistance: Contact guard assistance;Stand-by assistance  Gait Abnormalities: Decreased step clearance;Trunk sway increased  Base of Support: Widened  Speed/Sharon: Slow  Step Length: Right shortened;Left shortened  Therapeutic Exercises:   Encouraged/reviewed LAQs and seated chastity    Activity Tolerance:   Good and only mild SOB with activity   Please refer to the flowsheet for vital signs taken during this treatment.     After treatment patient left in no apparent distress:   Sitting in chair and Call bell within reach    COMMUNICATION/COLLABORATION:   The patients plan of care was discussed with: Occupational Therapist and Registered Nurse    Frances Evans PT, DPT   Time Calculation: 18 mins

## 2019-09-11 NOTE — PROGRESS NOTES
ADULT PROTOCOL: JET AEROSOL ASSESSMENT    Patient  Julia Smith     64 y.o.   female     9/11/2019  3:39 AM    Breath Sounds Pre Procedure: Right Breath Sounds: Diminished                               Left Breath Sounds: Diminished    Breath Sounds Post Procedure: Right Breath Sounds: Diminished                                 Left Breath Sounds: Diminished    Breathing pattern: Pre procedure Breathing Pattern: Regular          Post procedure Breathing Pattern: Regular    Heart Rate: Pre procedure Pulse: 89           Post procedure Pulse: 69    Resp Rate: Pre procedure Respirations: 18           Post procedure Respirations: 19        Oxygen: O2 Device: BIPAP   FiO2 (%) 35     Changed: NO    SpO2: Pre procedure SpO2: 95 %   with oxygen              Post procedure SpO2: 97 %  with oxygen    Nebulizer Therapy: Current medications Aerosolized Medications: DuoNeb, Pulmicort      Changed: YES  Duoneb changed to TID      Problem List:   Patient Active Problem List   Diagnosis Code    Seizure ([facility] R56.9    Altered mental status R41.82    Elevated serum creatinine R79.89    Left-sided weakness R53.1    Uncontrolled hypertension I10       Respiratory Therapist: Becky Rand

## 2019-09-11 NOTE — PROCEDURES
PROCEDURE: ROUTINE INPATIENT EEG  NAME:   Makenzie Velazquez  ACCOUNT NUMBER : [de-identified]  MRN:   [id number]  DATE OF SERVICE: 9/10/19    HISTORY/INDICATION: Pt is a 62yo female presenting with possible seizure activity in setting of COPD exacerbation. EEG is performed to assess for underlying structural or functional abnormality.       MEDICATIONS:   Current Facility-Administered Medications   Medication Dose Route Frequency Provider Last Rate Last Dose    albuterol-ipratropium (DUO-NEB) 2.5 MG-0.5 MG/3 ML  3 mL Nebulization TID RT Laurita Stearns MD   3 mL at 09/11/19 0818    potassium chloride SR (KLOR-CON 10) tablet 40 mEq  40 mEq Oral Q4H Laurita Stearns MD        0.45% sodium chloride infusion  75 mL/hr IntraVENous CONTINUOUS Laurita Stearns MD 75 mL/hr at 09/11/19 0536 75 mL/hr at 09/11/19 0536    losartan (COZAAR) tablet 25 mg  25 mg Oral DAILY Laurita Stearns MD        arformoterol (BROVANA) neb solution 15 mcg  15 mcg Nebulization BID RT Debby Carrion MD   Stopped at 09/09/19 2100    And    budesonide (PULMICORT) 500 mcg/2 ml nebulizer suspension  500 mcg Nebulization BID RT Debby Carrion MD   500 mcg at 09/11/19 0818    aspirin chewable tablet 81 mg  81 mg Oral DAILY Debby Carrion MD   81 mg at 09/10/19 0816    furosemide (LASIX) injection 40 mg  40 mg IntraVENous DAILY Debby Carrion MD   40 mg at 09/10/19 0817    famotidine (PF) (PEPCID) 20 mg in sodium chloride 0.9% 10 mL injection  20 mg IntraVENous Q12H Debby Carrion MD   20 mg at 09/10/19 2136    fentaNYL citrate (PF) injection 25 mcg  25 mcg IntraVENous Q4H PRN Billy Kumar MD        sodium chloride (NS) flush 5-40 mL  5-40 mL IntraVENous Q8H Trino Dash MD   10 mL at 09/11/19 0536    sodium chloride (NS) flush 5-40 mL  5-40 mL IntraVENous PRN Ida Bustos MD        bisacodyl (DULCOLAX) suppository 10 mg  10 mg Rectal DAILY PRN Ida Bustos MD        LORazepam (ATIVAN) injection 2 mg  2 mg IntraVENous Q2H PRN Bhaskar Painting MD   2 mg at 09/06/19 1048    glucose chewable tablet 16 g  4 Tab Oral PRN Riri Amor MD        glucagon (GLUCAGEN) injection 1 mg  1 mg IntraMUSCular PRN Riri Amor MD        insulin lispro (HUMALOG) injection   SubCUTAneous Q6H Riri Amor MD   2 Units at 09/11/19 0535    dextrose 10 % infusion 125-250 mL  125-250 mL IntraVENous PRN Riri Amor MD           CONDITIONS OF RECORDING: This is a routine 21-channel EEG recording performed in accordance with the international 10-20 system with one channel devoted to limited EKG. This study was done during a state of wakefulness. Photic stimulation was performed as an activating procedure. DESCRIPTION:   Upon maximal arousal the posterior dominant rhythm has a frequency of 9Hz with an amplitude of 20uV. This activity is symmetric over the bilateral posterior derivations and attenuates with eye opening. There is intermittent higher amplitude 5Hz slowing seen in the right frontotemporal leads and intermittent 7Hz slowing seen in the left frontotemporal leads, occurring independently. There are occasional right frontotemporal sharp waves seen occurring singly with amplitudes up to 80uV. Photic stimulation did not significantly alter the tracing. No sleep is seen. There are no electrographic seizures seen. INTERPRETATION: Abnormal EEG due to 1) Intermittent left frontotemporal theta slowing, 2) intermittent right frontotemporal delta slowing, and 3) occasional right frontotemporal sharp waves    CLINICAL CORRELATION: Findings are concerning for underlying structural or functional abnormalities in the bilateral frontotemporal regions, as are the focal sharp waves in the frontotemporal region, while non-specific, this could indicate underlying epileptogenic focus. Clinical correlation advised.      Johan Marrufo MD

## 2019-09-11 NOTE — PROGRESS NOTES
Problem: Self Care Deficits Care Plan (Adult)  Goal: *Acute Goals and Plan of Care (Insert Text)  Description    FUNCTIONAL STATUS PRIOR TO ADMISSION: Patient was modified independent using no AD for mobility, uses a shower chair and grab bars in shower. HOME SUPPORT: The patient lived with 4 roommates but did not require assist.    Occupational Therapy Goals  Initiated 9/9/2019  1. Patient will perform grooming sitting unsupported with SBA within 7 day(s). 2.  Patient will perform upper body ADLs with SBA within  day(s). 3.  Patient will perform lower body ADLs with minimal assistance/contact guard assist within 7 day(s). 4.  Patient will perform toilet transfers with minimal assistance/contact guard assist within 7 day(s). 5.  Patient will perform all aspects of toileting with minimal assistance/contact guard assist within 7 day(s). 6.  Patient will participate in upper extremity therapeutic exercise/activities with independence for 5 minutes within 7 day(s). 7.  Patient will utilize energy conservation techniques during functional activities with verbal cues within 7 day(s). Outcome: Progressing Towards Goal    OCCUPATIONAL THERAPY TREATMENT  Patient: Julia Smith (7 y.o. female)  Date: 9/11/2019  Diagnosis: Altered mental status [R41.82]  Seizure ([facility] [R56.9]  Uncontrolled hypertension [I10]  Elevated serum creatinine [R79.89]  Left-sided weakness [R53.1] <principal problem not specified>       Precautions: Fall  Chart, occupational therapy assessment, plan of care, and goals were reviewed. ASSESSMENT  Based on the objective data described below, pt presents with generalized weakness and decreased activity tolerance/performance 2/2 bedrest and medical concerns. Of note, prior OT notes indicated concern with confusion. Pt oriented to self, location, reason for admission, date.  However, pt with difficulty with recall for items discussed during session with therapist. May benefit from further screening ([address] [facility] [place] [address] to determine potential areas of concern with safety at home and confusion. Current Level of Function Impacting Discharge (ADLs): stand by assist/min A for ADLS,    Other factors to consider for discharge: Pt lives with roommates, may not be able to provide assistance in home if safety concerns         PLAN :  Patient continues to benefit from skilled intervention to address the above impairments. Continue treatment per established plan of care. to address goals. Recommend with staff: walker with staff    Recommend next OT session: [address] [facility] [place] [place] (?), full dressing     Recommendation for discharge: (in order for the patient to meet his/her long term goals)  Occupational therapy at least 2 days/week in the home AND ensure assist and/or supervision for safety with iADLs     This discharge recommendation:  Has not yet been discussed the attending provider and/or case management    Equipment recommendations for successful discharge (if) home: shower chair and transfer bench       SUBJECTIVE:   Patient stated i've had these episodes before. I think I have anxiety.     OBJECTIVE DATA SUMMARY:   Cognitive/Behavioral Status:    Pt was alert/oriented x 4, however did demosntrated confusion and diffiuclty with recall with information discussed during session. May benefit from further monitoring/assessment to ensure pt is safe to return home. Functional Mobility and Transfers for ADLs:    Transfers:     Functional Transfers  Bathroom Mobility: Stand-by assistance, pt navigated walker by side stepping due to narrow door with supervision/SBA       Balance:  Sitting: Intact  Sitting - Static: Good (unsupported)  Standing: Intact  Standing - Static: Good  Standing - Dynamic : Fair(SBA)    ADL Intervention:     LB dressing: donned/doff slip on shoes with s/u. Declined to wear socks, reports she doesn't like to wear socks.     donned underwear seated with set-up assistance and SBA when standing to manage pants up. Grooming: Pt brushed teeth at sink with SBA and walker for balance. Able to manage small containers without difficulty. Denied dizziness while performing tasks. Pain:  Denied during session    Activity Tolerance:   Fair  Please refer to the flowsheet for vital signs taken during this treatment. After treatment patient left in no apparent distress:   Sitting in chair, call bell, PT in to see pt.     COMMUNICATION/COLLABORATION:   The patients plan of care was discussed with: Physical Therapist and Registered Nurse   Education: Fall prevention        IZZY Shrestha/L  Time Calculation: 29 mins

## 2019-09-11 NOTE — DIABETES MGMT
[facility]    [facility] Consult Note    Recommendations/ Comments:  BG ranging 133-172 mg/dl over the past 24 hours. She is only receiving correction scale insulin at this time. During visit at bedside, pt informs me that she has been on Metformin in the past but was taken off it during a prior hospitalization. Indicates this is not a new diagnosis. For discharge, recommend resume Metformin, 500 mg BID and titrate to max dose. She denies any side effects with prior use. Current hospital DM medication: lispro correction scale     Consult received for:  [x]             Assessment of home management                [x]      Medication Recommendations                []             Meter/monitoring     []             Insulin instruction     []             New diagnosis     []             Outpatient education     []             Insulin pump patient     []             Insulin infusion     []             DKA/HHS    Chart reviewed and initial evaluation complete on Glenn Kumari. Patient is a 64 y.o. female with pt reported \"borderline\" diabetes PTA. During our conversation, pt states, \"I guess I have diabetes again\". Pt reports to have Reli On meter that was she was using up to a week prior to hospitalization. Usually was checking BG a few times a week. Reports BG stayed in the 150-160 mg/dl range most of the time. Denies hypoglycemia. Assessed and instructed patient on the following:    interpretation of lab results, blood sugar goals, hypoglycemia prevention and treatment, exercise, SMBG skills and nutrition    Review BG/A1c targets and monitoring techniques. Reviewed how to handle hypoglycemia. Discussed benefit of activity on BG mgmt. Reviewed basic diet modifications. Encouraged outpatient follow up but pt will have a 6 month driving restriction after hospitalization. She reports she will give DTC a call to schedule once she arranges transportation.     Encouraged the following:   · increased exercise, dietary modifications: plate method, consistent carb intake, protein with all meals, avoidance of SSB, regular blood sugar monitorin-2 times daily    Provided patient with the following: [x]             Diabetes Self Care Guide               []             Insulin education materials               []             CHO counting education materials               [x]             Outpatient DTC contact number               []             Glucometer                 Discussed with patient and/or family need for follow up appointment for diabetes management after discharge. A1c:   Lab Results   Component Value Date/Time    Hemoglobin A1c 7.2 (H) 2019 08:56 AM       Recent Glucose Results:   Lab Results   Component Value Date/Time     (H) 2019 03:34 AM    GLUCPOC 156 (H) 2019 12:40 PM    GLUCPOC 145 (H) 2019 05:32 AM    GLUCPOC 135 (H) 09/10/2019 11:37 PM        Lab Results   Component Value Date/Time    Creatinine 1.03 (H) 2019 03:34 AM     Estimated Creatinine Clearance: 82.4 mL/min (A) (based on SCr of 1.03 mg/dL (H)). Active Orders   Diet    DIET DIABETIC CONSISTENT CARB Regular        PO intake: No data found. Will continue to follow as needed. Thank you.   Verlean Koyanagi, RD, Diabetes Clinician       Time spent: 15 minutes

## 2019-09-12 PROBLEM — I47.20 VT (VENTRICULAR TACHYCARDIA): Status: ACTIVE | Noted: 2019-09-05

## 2019-09-12 LAB
ALBUMIN SERPL-MCNC: 3.4 G/DL (ref 3.5–5)
ALBUMIN/GLOB SERPL: 0.9 {RATIO} (ref 1.1–2.2)
ALP SERPL-CCNC: 59 U/L (ref 45–117)
ALT SERPL-CCNC: 45 U/L (ref 12–78)
ANION GAP SERPL CALC-SCNC: 10 MMOL/L (ref 5–15)
AST SERPL-CCNC: 29 U/L (ref 15–37)
ATRIAL RATE: 87 BPM
BILIRUB SERPL-MCNC: 0.8 MG/DL (ref 0.2–1)
BUN SERPL-MCNC: 27 MG/DL (ref 6–20)
BUN/CREAT SERPL: 25 (ref 12–20)
CALCIUM SERPL-MCNC: 9.3 MG/DL (ref 8.5–10.1)
CALCULATED P AXIS, ECG09: 49 DEGREES
CALCULATED R AXIS, ECG10: 93 DEGREES
CALCULATED T AXIS, ECG11: 26 DEGREES
CHLORIDE SERPL-SCNC: 105 MMOL/L (ref 97–108)
CO2 SERPL-SCNC: 28 MMOL/L (ref 21–32)
CREAT SERPL-MCNC: 1.09 MG/DL (ref 0.55–1.02)
DIAGNOSIS, 93000: NORMAL
ERYTHROCYTE [DISTWIDTH] IN BLOOD BY AUTOMATED COUNT: 12.8 % (ref 11.5–14.5)
GLOBULIN SER CALC-MCNC: 3.8 G/DL (ref 2–4)
GLUCOSE BLD STRIP.AUTO-MCNC: 116 MG/DL (ref 65–100)
GLUCOSE BLD STRIP.AUTO-MCNC: 124 MG/DL (ref 65–100)
GLUCOSE BLD STRIP.AUTO-MCNC: 140 MG/DL (ref 65–100)
GLUCOSE BLD STRIP.AUTO-MCNC: 149 MG/DL (ref 65–100)
GLUCOSE SERPL-MCNC: 131 MG/DL (ref 65–100)
HCT VFR BLD AUTO: 42 % (ref 35–47)
HGB BLD-MCNC: 13.3 G/DL (ref 11.5–16)
MCH RBC QN AUTO: 31.1 PG (ref 26–34)
MCHC RBC AUTO-ENTMCNC: 31.7 G/DL (ref 30–36.5)
MCV RBC AUTO: 98.4 FL (ref 80–99)
NRBC # BLD: 0 K/UL (ref 0–0.01)
NRBC BLD-RTO: 0 PER 100 WBC
P-R INTERVAL, ECG05: 162 MS
PLATELET # BLD AUTO: 267 K/UL (ref 150–400)
PMV BLD AUTO: 11.4 FL (ref 8.9–12.9)
POTASSIUM SERPL-SCNC: 3.8 MMOL/L (ref 3.5–5.1)
PROT SERPL-MCNC: 7.2 G/DL (ref 6.4–8.2)
Q-T INTERVAL, ECG07: 394 MS
QRS DURATION, ECG06: 82 MS
QTC CALCULATION (BEZET), ECG08: 474 MS
RBC # BLD AUTO: 4.27 M/UL (ref 3.8–5.2)
SERVICE CMNT-IMP: ABNORMAL
SODIUM SERPL-SCNC: 143 MMOL/L (ref 136–145)
VENTRICULAR RATE, ECG03: 87 BPM
WBC # BLD AUTO: 8.6 K/UL (ref 3.6–11)

## 2019-09-12 PROCEDURE — 74011000250 HC RX REV CODE- 250: Performed by: HOSPITALIST

## 2019-09-12 PROCEDURE — 80053 COMPREHEN METABOLIC PANEL: CPT

## 2019-09-12 PROCEDURE — 74011636637 HC RX REV CODE- 636/637: Performed by: FAMILY MEDICINE

## 2019-09-12 PROCEDURE — 82962 GLUCOSE BLOOD TEST: CPT

## 2019-09-12 PROCEDURE — 77030013744: Performed by: INTERNAL MEDICINE

## 2019-09-12 PROCEDURE — 74011250637 HC RX REV CODE- 250/637: Performed by: INTERNAL MEDICINE

## 2019-09-12 PROCEDURE — 74011250636 HC RX REV CODE- 250/636: Performed by: INTERNAL MEDICINE

## 2019-09-12 PROCEDURE — 74011250636 HC RX REV CODE- 250/636

## 2019-09-12 PROCEDURE — 77010033678 HC OXYGEN DAILY

## 2019-09-12 PROCEDURE — 99152 MOD SED SAME PHYS/QHP 5/>YRS: CPT | Performed by: INTERNAL MEDICINE

## 2019-09-12 PROCEDURE — 99153 MOD SED SAME PHYS/QHP EA: CPT | Performed by: INTERNAL MEDICINE

## 2019-09-12 PROCEDURE — 5A09357 ASSISTANCE WITH RESPIRATORY VENTILATION, LESS THAN 24 CONSECUTIVE HOURS, CONTINUOUS POSITIVE AIRWAY PRESSURE: ICD-10-PCS | Performed by: HOSPITALIST

## 2019-09-12 PROCEDURE — 65660000000 HC RM CCU STEPDOWN

## 2019-09-12 PROCEDURE — B2111ZZ FLUOROSCOPY OF MULTIPLE CORONARY ARTERIES USING LOW OSMOLAR CONTRAST: ICD-10-PCS | Performed by: INTERNAL MEDICINE

## 2019-09-12 PROCEDURE — 77030019569 HC BND COMPR RAD TERU -B: Performed by: INTERNAL MEDICINE

## 2019-09-12 PROCEDURE — 94660 CPAP INITIATION&MGMT: CPT

## 2019-09-12 PROCEDURE — 77030004532 HC CATH ANGI DX IMP BSC -A: Performed by: INTERNAL MEDICINE

## 2019-09-12 PROCEDURE — B2151ZZ FLUOROSCOPY OF LEFT HEART USING LOW OSMOLAR CONTRAST: ICD-10-PCS | Performed by: INTERNAL MEDICINE

## 2019-09-12 PROCEDURE — 74011250636 HC RX REV CODE- 250/636: Performed by: HOSPITALIST

## 2019-09-12 PROCEDURE — 36415 COLL VENOUS BLD VENIPUNCTURE: CPT

## 2019-09-12 PROCEDURE — 74011000250 HC RX REV CODE- 250: Performed by: INTERNAL MEDICINE

## 2019-09-12 PROCEDURE — 94640 AIRWAY INHALATION TREATMENT: CPT

## 2019-09-12 PROCEDURE — 85027 COMPLETE CBC AUTOMATED: CPT

## 2019-09-12 PROCEDURE — 94664 DEMO&/EVAL PT USE INHALER: CPT

## 2019-09-12 PROCEDURE — 74011636320 HC RX REV CODE- 636/320: Performed by: INTERNAL MEDICINE

## 2019-09-12 PROCEDURE — 4A023N7 MEASUREMENT OF CARDIAC SAMPLING AND PRESSURE, LEFT HEART, PERCUTANEOUS APPROACH: ICD-10-PCS | Performed by: INTERNAL MEDICINE

## 2019-09-12 PROCEDURE — 93458 L HRT ARTERY/VENTRICLE ANGIO: CPT | Performed by: INTERNAL MEDICINE

## 2019-09-12 PROCEDURE — 74011250637 HC RX REV CODE- 250/637: Performed by: HOSPITALIST

## 2019-09-12 RX ORDER — HEPARIN SODIUM 1000 [USP'U]/ML
INJECTION, SOLUTION INTRAVENOUS; SUBCUTANEOUS AS NEEDED
Status: DISCONTINUED | OUTPATIENT
Start: 2019-09-12 | End: 2019-09-12 | Stop reason: HOSPADM

## 2019-09-12 RX ORDER — POTASSIUM CHLORIDE 14.9 MG/ML
10 INJECTION INTRAVENOUS ONCE
Status: COMPLETED | OUTPATIENT
Start: 2019-09-12 | End: 2019-09-12

## 2019-09-12 RX ORDER — HEPARIN SODIUM 200 [USP'U]/100ML
INJECTION, SOLUTION INTRAVENOUS
Status: COMPLETED | OUTPATIENT
Start: 2019-09-12 | End: 2019-09-12

## 2019-09-12 RX ORDER — SODIUM CHLORIDE 0.9 % (FLUSH) 0.9 %
5-40 SYRINGE (ML) INJECTION AS NEEDED
Status: DISCONTINUED | OUTPATIENT
Start: 2019-09-12 | End: 2019-09-13 | Stop reason: HOSPADM

## 2019-09-12 RX ORDER — SODIUM CHLORIDE 9 MG/ML
500 INJECTION, SOLUTION INTRAVENOUS CONTINUOUS
Status: DISCONTINUED | OUTPATIENT
Start: 2019-09-12 | End: 2019-09-13 | Stop reason: HOSPADM

## 2019-09-12 RX ORDER — SODIUM CHLORIDE 0.9 % (FLUSH) 0.9 %
5-40 SYRINGE (ML) INJECTION EVERY 8 HOURS
Status: DISCONTINUED | OUTPATIENT
Start: 2019-09-12 | End: 2019-09-13 | Stop reason: HOSPADM

## 2019-09-12 RX ORDER — POTASSIUM CHLORIDE 750 MG/1
20 TABLET, FILM COATED, EXTENDED RELEASE ORAL
Status: COMPLETED | OUTPATIENT
Start: 2019-09-12 | End: 2019-09-12

## 2019-09-12 RX ORDER — SODIUM CHLORIDE 0.9 % (FLUSH) 0.9 %
5-40 SYRINGE (ML) INJECTION AS NEEDED
Status: DISCONTINUED | OUTPATIENT
Start: 2019-09-12 | End: 2019-09-12 | Stop reason: HOSPADM

## 2019-09-12 RX ORDER — LIDOCAINE HYDROCHLORIDE 10 MG/ML
INJECTION INFILTRATION; PERINEURAL AS NEEDED
Status: DISCONTINUED | OUTPATIENT
Start: 2019-09-12 | End: 2019-09-12 | Stop reason: HOSPADM

## 2019-09-12 RX ORDER — METOPROLOL SUCCINATE 25 MG/1
25 TABLET, EXTENDED RELEASE ORAL DAILY
Status: DISCONTINUED | OUTPATIENT
Start: 2019-09-12 | End: 2019-09-13 | Stop reason: HOSPADM

## 2019-09-12 RX ORDER — MIDAZOLAM HYDROCHLORIDE 1 MG/ML
INJECTION, SOLUTION INTRAMUSCULAR; INTRAVENOUS AS NEEDED
Status: DISCONTINUED | OUTPATIENT
Start: 2019-09-12 | End: 2019-09-12 | Stop reason: HOSPADM

## 2019-09-12 RX ORDER — FENTANYL CITRATE 50 UG/ML
INJECTION, SOLUTION INTRAMUSCULAR; INTRAVENOUS AS NEEDED
Status: DISCONTINUED | OUTPATIENT
Start: 2019-09-12 | End: 2019-09-12 | Stop reason: HOSPADM

## 2019-09-12 RX ORDER — SODIUM CHLORIDE 0.9 % (FLUSH) 0.9 %
5-40 SYRINGE (ML) INJECTION EVERY 8 HOURS
Status: DISCONTINUED | OUTPATIENT
Start: 2019-09-12 | End: 2019-09-12 | Stop reason: HOSPADM

## 2019-09-12 RX ADMIN — LEVETIRACETAM 500 MG: 500 TABLET ORAL at 18:25

## 2019-09-12 RX ADMIN — METOPROLOL SUCCINATE 25 MG: 25 TABLET, EXTENDED RELEASE ORAL at 09:59

## 2019-09-12 RX ADMIN — Medication 10 ML: at 18:26

## 2019-09-12 RX ADMIN — Medication 10 ML: at 21:03

## 2019-09-12 RX ADMIN — FENTANYL CITRATE 25 MCG: 50 INJECTION, SOLUTION INTRAMUSCULAR; INTRAVENOUS at 21:03

## 2019-09-12 RX ADMIN — Medication 10 ML: at 16:57

## 2019-09-12 RX ADMIN — Medication 10 ML: at 06:20

## 2019-09-12 RX ADMIN — POTASSIUM CHLORIDE 10 MEQ: 200 INJECTION, SOLUTION INTRAVENOUS at 09:59

## 2019-09-12 RX ADMIN — SODIUM CHLORIDE 500 ML: 900 INJECTION, SOLUTION INTRAVENOUS at 16:56

## 2019-09-12 RX ADMIN — IPRATROPIUM BROMIDE AND ALBUTEROL SULFATE 3 ML: .5; 3 SOLUTION RESPIRATORY (INHALATION) at 21:05

## 2019-09-12 RX ADMIN — LEVETIRACETAM 500 MG: 500 TABLET ORAL at 09:59

## 2019-09-12 RX ADMIN — POTASSIUM CHLORIDE 20 MEQ: 750 TABLET, EXTENDED RELEASE ORAL at 11:03

## 2019-09-12 RX ADMIN — INSULIN LISPRO 2 UNITS: 100 INJECTION, SOLUTION INTRAVENOUS; SUBCUTANEOUS at 06:20

## 2019-09-12 RX ADMIN — FAMOTIDINE 20 MG: 10 INJECTION, SOLUTION INTRAVENOUS at 09:59

## 2019-09-12 RX ADMIN — BUDESONIDE 500 MCG: 0.5 INHALANT RESPIRATORY (INHALATION) at 21:05

## 2019-09-12 NOTE — PROGRESS NOTES
Transfer to [facility] from Procedure Area    Verbal report given to Bravo Boston on Bc Webb being transferred to Cardiac Cath Lab  for routine progression of care   Patient is post left heart cath procedure. Patient stable upon transfer to . Report consisted of patients Situation, Background, Assessment and   Recommendations(SBAR). Information from the following report(s) Kardex, Procedure Summary, MAR and Recent Results was reviewed with the receiving nurse. Opportunity for questions and clarification was provided. Patient medicated during procedure with orders obtained and verified by Dr. Luis King. Refer to patient PROCEDURE REPORT for vital signs, assessment, status, and response during procedure.

## 2019-09-12 NOTE — PROGRESS NOTES
Cardiac Cath Lab Procedure Area Arrival Note:    Sg Buckner arrived to Cardiac Cath Lab, Procedure Area. Patient identifiers verified with NAME and DATE OF BIRTH. Procedure verified with patient. Consent forms verified. Allergies verified. Patient informed of procedure and plan of care. Questions answered with review. Patient voiced understanding of procedure and plan of care. Patient on cardiac monitor, non-invasive blood pressure, SPO2 monitor. On O2 @ 3 lpm via nasal cannula. IV of normal saline on pump at 75 ml/hr. Patient status doing well without problems. Patient is A&Ox 4. Patient reports no pain. Patient medicated during procedure with orders obtained and verified by Dr. Waldemar Bragg. Refer to patients Cardiac Cath Lab PROCEDURE REPORT for vital signs, assessment, status, and response during procedure, printed at end of case. Printed report on chart or scanned into chart.

## 2019-09-12 NOTE — PROGRESS NOTES
TRANSFER - IN REPORT:    Verbal report received from Dinh Mayers RN on Gianfranco Bueno, Procedure: Cath procedure with no stents , from the Cardiac Cath lab, for routine progression of care. Report consisted of patients Situation, Background, Assessment and Recommendations(SBAR). Information from the following report(s) Procedure Summary, MAR and Recent Results was reviewed with the receiving clinician. Opportunity for questions and clarification was provided. Assessment completed upon patients arrival to [address] [place] [address] and care assumed. Cardiac Cath Lab Recovery Arrival Note:     Gianfranco Bueno arrived to [facility] recovery area. Patient procedure= cath procedure with no stents. Patient on cardiac monitor, non-invasive blood pressure, Patient status doing well without problems. Patient is A&Ox 4. Patient reports no pain, no chest pain, no n/v. Procedure site without any bleeding and no hematoma.

## 2019-09-12 NOTE — PROGRESS NOTES
Hospitalist Progress Note  Mynor Turpin MD  Answering service: [phone] from in house phone  Cell: [phone]      Date of Service:  2019  NAME:  Polina Castaneda  :  1960  MRN:  [id number]      Admission Summary:     A 72-year-old white female with past medical history of COPD, obstructive sleep apnea, hypertension, morbid obesity, peripheral edema, presented to the emergency department via EMS with reported seizures and altered mental status. Interval history / Subjective:     She said she had dizziness this morning but improved now, no chest pain, discharge cancelled after she had VT on ,      Assessment & Plan:     Recurrent  VT   -on metoprolol   -feels dizziness, no chest pain  -ekg normal sinus vent rate 87 bpm non specific st t wave long   -check electrolyte   -she said her father passed away at age of 52 due to massive MI  -cardiologist on board and plan for cardiac cath    Acute on chronic hypoxic hypercapnic respiratory failure due to acute COPD exacerbation, obesity hypoventilation and possible CORWIN   -continue pulmicort, brovana, duo neb, oxygen support   -chest x ray on  unchanged mild pulmonary edema. New left basilar atelectasis and small left pleural effusion. -patient home oxygen dependent 2 l/m, monitor pulse ox     Acute COPD exacerbation   -improving, continue on duo neb, oxygen support, pulmicort and brovana, monitor pulse ox  -home oxygen dependent     Possible Seizure  -on keppra 500 mg bid  -no seizure since admission  -EEG there are no focal abnormalities, epileptiform discharges, or electrographic seizures seen.   abnormal eeg due to diffuse beta activity  -Neurologist on board    Suspected acute CVA, left sided neglect s/p tpA  -continue on aspirin  -improved   -CT head no acute intracranial process identified  -CTA head no evidence of large vessel occlusion or perfusion abnormality, no hemodynamically significant carotid artery stenosis. Acute encephalopathy POA  -improving, patient is conscious and alert, oriented to place and person, has on and off confusion and she is forgetful  -no leukocytosis, fever  -alcohol <10  -continue neuro check and supportive care    HTN  -BP normal, continue on losartan, monitor BP    T2DM new diagnosis  -A1c 7.2  -continue sliding scale and monitor finger stick glucose  -will add metformin on discharge     Hypokalemia   -resolved    Hypernatremia   -improved    HTN  -BP not at goal, continue losartan, metoprolol is added, monitor BP    Hx of lower extremities edema  -continue on lasix    Hx of CORIWN  -CPAP q hs      Code status: Full Code  DVT prophylaxis: SCD    Care Plan discussed with: Patient/Family and Nurse  Disposition: home with home health  I called and updated her sister, Vicki Moss at , questions answered on 9/11     Hospital Problems  Never Reviewed          Codes Class Noted POA    Seizure ([facility] ICD-10-CM: R56.9  ICD-9-CM: 780.39  9/5/2019 Unknown        Altered mental status ICD-10-CM: R41.82  ICD-9-CM: 780.97  9/5/2019 Unknown        Elevated serum creatinine ICD-10-CM: R79.89  ICD-9-CM: 790.99  9/5/2019 Unknown        Left-sided weakness ICD-10-CM: R53.1  ICD-9-CM: 728.87  9/5/2019 Unknown        Uncontrolled hypertension ICD-10-CM: I10  ICD-9-CM: 401.9  9/5/2019 Unknown        * (Principal) VT (ventricular tachycardia) ([facility] ICD-10-CM: I47.2  ICD-9-CM: 427.1  9/5/2019     Overview Signed 9/12/2019  6:50 AM by Stacie Tolentino MD     Added automatically from request for surgery [id number]                   Vital Signs:    Last 24hrs VS reviewed since prior progress note.  Most recent are:  Visit Vitals  /73 (BP 1 Location: Left arm, BP Patient Position: At rest)   Pulse 86   Temp 97.8 °F (36.6 °C)   Resp 18   Ht 5' 3\" (1.6 m)   Wt 139 kg (306 lb 8 oz)   SpO2 95%   BMI 54.29 kg/m²         Intake/Output Summary (Last 24 hours) at 9/12/2019 0859  Last data filed at 9/12/2019 0425  Gross per 24 hour   Intake 0 ml   Output 300 ml   Net -300 ml        Physical Examination:             Constitutional:  No acute distress, cooperative, pleasant    ENT:  Oral mucous moist, oropharynx benign. Neck supple,    Resp:  CTA bilaterally. No wheezing/rhonchi/rales. No accessory muscle use   CV:  Regular rhythm, normal rate, no murmurs, gallops, rubs    GI:  Soft, non distended, non tender. normoactive bowel sounds, no hepatosplenomegaly     Musculoskeletal:  No edema    Neurologic:  Conscious and alert, oriented to place, time and person, moves all extremities, CN II-XII reviewed     Skin:  Good turgor, no rashes or ulcers       Data Review:    Review and/or order of clinical lab test  Review and/or order of tests in the radiology section of CPT  Review and/or order of tests in the medicine section of [facility]      Labs:     Recent Labs     09/12/19 0457 09/11/19  0334   WBC 8.6 10.6   HGB 13.3 13.7   HCT 42.0 43.0    248     Recent Labs     09/12/19 0457 09/11/19  1816 09/11/19  0334    142 143   K 3.8 3.7 3.2*    104 104   CO2 28 27 29   BUN 27* 22* 18   CREA 1.09* 0.98 1.03*   * 137* 143*   CA 9.3 9.2 9.5   MG  --  2.1  --      Recent Labs     09/12/19 0457 09/11/19  0334 09/09/19  1242   SGOT 29 30 30   ALT 45 39 31   AP 59 60 58   TBILI 0.8 0.8 0.8   TP 7.2 7.3 7.0   ALB 3.4* 3.4* 3.4*   GLOB 3.8 3.9 3.6     No results for input(s): INR, PTP, APTT in the last 72 hours. No lab exists for component: INREXT, INREXT   No results for input(s): FE, TIBC, PSAT, FERR in the last 72 hours. No results found for: FOL, RBCF   No results for input(s): PH, PCO2, PO2 in the last 72 hours. No results for input(s): CPK, CKNDX, TROIQ in the last 72 hours.     No lab exists for component: CPKMB  Lab Results   Component Value Date/Time    Cholesterol, total 175 09/06/2019 08:56 AM    HDL Cholesterol 39 09/06/2019 08:56 AM    LDL, calculated 97.8 09/06/2019 08:56 AM    Triglyceride 191 (H) 09/06/2019 08:56 AM    CHOL/HDL Ratio 4.5 09/06/2019 08:56 AM     Lab Results   Component Value Date/Time    Glucose (POC) 149 (H) 09/12/2019 06:14 AM    Glucose (POC) 138 (H) 09/11/2019 11:20 PM    Glucose (POC) 145 (H) 09/11/2019 09:24 PM    Glucose (POC) 134 (H) 09/11/2019 04:32 PM    Glucose (POC) 156 (H) 09/11/2019 12:40 PM     No results found for: COLOR, APPRN, SPGRU, REFSG, RAMIRO, PROTU, GLUCU, KETU, BILU, UROU, DEAN, LEUKU, GLUKE, EPSU, BACTU, WBCU, RBCU, CASTS, UCRY      Medications Reviewed:     Current Facility-Administered Medications   Medication Dose Route Frequency    metoprolol succinate (TOPROL-XL) XL tablet 25 mg  25 mg Oral DAILY    potassium chloride 10 mEq in 50 ml IVPB  10 mEq IntraVENous ONCE    albuterol-ipratropium (DUO-NEB) 2.5 MG-0.5 MG/3 ML  3 mL Nebulization TID RT    levETIRAcetam (KEPPRA) tablet 500 mg  500 mg Oral BID    arformoterol (BROVANA) neb solution 15 mcg  15 mcg Nebulization BID RT    And    budesonide (PULMICORT) 500 mcg/2 ml nebulizer suspension  500 mcg Nebulization BID RT    aspirin chewable tablet 81 mg  81 mg Oral DAILY    furosemide (LASIX) injection 40 mg  40 mg IntraVENous DAILY    famotidine (PF) (PEPCID) 20 mg in sodium chloride 0.9% 10 mL injection  20 mg IntraVENous Q12H    fentaNYL citrate (PF) injection 25 mcg  25 mcg IntraVENous Q4H PRN    sodium chloride (NS) flush 5-40 mL  5-40 mL IntraVENous Q8H    sodium chloride (NS) flush 5-40 mL  5-40 mL IntraVENous PRN    bisacodyl (DULCOLAX) suppository 10 mg  10 mg Rectal DAILY PRN    LORazepam (ATIVAN) injection 2 mg  2 mg IntraVENous Q2H PRN    glucose chewable tablet 16 g  4 Tab Oral PRN    glucagon (GLUCAGEN) injection 1 mg  1 mg IntraMUSCular PRN    insulin lispro (HUMALOG) injection   SubCUTAneous Q6H    dextrose 10 % infusion 125-250 mL  125-250 mL IntraVENous PRN ______________________________________________________________________  EXPECTED LENGTH OF STAY: 5d 0h  ACTUAL LENGTH OF STAY:          7                 Gi Suarez MD

## 2019-09-12 NOTE — PROGRESS NOTES
I have reviewed and agree with the assesment and charting of Chloe Martinez [place]

## 2019-09-12 NOTE — PROGRESS NOTES
Occupational Therapy  Chart reviewed; patient currently in cardiac cath lab; will retry tomorrow.  Parisa MAGANA/WANG

## 2019-09-12 NOTE — PROCEDURES
DIAGNOSIS  1)Normal LVEDP  2)Normal coronaries  3)Hypoxemia at presentation resolved with O2 likely from OHS    Recommendations  1)Management per EP.     Contrast use 20 cc    Right radial access-- no issues

## 2019-09-12 NOTE — PROGRESS NOTES
Physical Therapy Note:  Patient MALINDA to cardiac cath lab for VT. Will defer and follow up tomorrow.  Agnes Luz, PT, DPT

## 2019-09-12 NOTE — PROGRESS NOTES
Cardiac Electrophysiology Hospital Progress Note   REFERRING PROVIDER: Dr Terence El  Subjective:      Shalini Verduzco is a 64 y.o. patient who is seen for evaluation/management of monomorphic ventricular tachycardia. She had several runs up to 30 beats, symptomatic with dizziness. This occurred just prior to planned discharge. She did desat a couple hours prior to VT while off oxygen, but had resumed it before VT occurred. K 3.2 at the time, supplemented. K 3.7 today. Chronic leg edema & dyspnea, was previously told that she has a large heart, reports negative stress test several years ago. Scheduled for cardiac cath with Dr. Heavenly Subramanian this afternoon. HR 80s this morning, /73. Further monomorphic VT noted last night, was symptomatic with dizziness, but vital signs otherwise stable. Previous:  Echo (09/07/2019): LVEF 56-60%, no RWMA, mild concentric LVH, grade 2 diastolic dysfunction. Mildly dilated LA. Severely elevated CVP. Mild AR. COPD, on home oxygen. No history CAD.           Patient Active Problem List   Diagnosis Code    Seizure ([facility] R56.9    Altered mental status R41.82    Elevated serum creatinine R79.89    Left-sided weakness R53.1    Uncontrolled hypertension I10    VT (ventricular tachycardia) ([facility]) I47.2     Current Facility-Administered Medications   Medication Dose Route Frequency Provider Last Rate Last Dose    metoprolol succinate (TOPROL-XL) XL tablet 25 mg  25 mg Oral DAILY Nubia Agarwal MD        potassium chloride 10 mEq in 50 ml IVPB  10 mEq IntraVENous ONCE Keagan Richardson MD        albuterol-ipratropium (DUO-NEB) 2.5 MG-0.5 MG/3 ML  3 mL Nebulization TID RT Keagan Richardson MD   Stopped at 09/12/19 0800    levETIRAcetam (KEPPRA) tablet 500 mg  500 mg Oral BID Keagan Richardson MD   500 mg at 09/11/19 1908    arformoterol (BROVANA) neb solution 15 mcg  15 mcg Nebulization BID RT Sujata Salcedo MD   Stopped at 09/09/19 2100    And    budesonide (PULMICORT) 500 mcg/2 ml nebulizer suspension  500 mcg Nebulization BID RT Michael Ware MD   Stopped at 19 0900    aspirin chewable tablet 81 mg  81 mg Oral DAILY Michael Ware MD   81 mg at 19 1049    furosemide (LASIX) injection 40 mg  40 mg IntraVENous DAILY Michael Ware MD   40 mg at 19 1049    famotidine (PF) (PEPCID) 20 mg in sodium chloride 0.9% 10 mL injection  20 mg IntraVENous Q12H Michael Ware MD   20 mg at 19 2114    fentaNYL citrate (PF) injection 25 mcg  25 mcg IntraVENous Q4H PRN Anabel Potter MD        sodium chloride (NS) flush 5-40 mL  5-40 mL IntraVENous Q8H EktaTrino oropeza MD   10 mL at 19 0620    sodium chloride (NS) flush 5-40 mL  5-40 mL IntraVENous PRN Kathy Marroquin MD        bisacodyl (DULCOLAX) suppository 10 mg  10 mg Rectal DAILY PRN Kathy Marroquin MD        LORazepam (ATIVAN) injection 2 mg  2 mg IntraVENous Q2H PRN Bhaskar Painting MD   2 mg at 19 1048    glucose chewable tablet 16 g  4 Tab Oral PRN Kathy Marroquin MD        glucagon (GLUCAGEN) injection 1 mg  1 mg IntraMUSCular PRN Kathy Marroquin MD        insulin lispro (HUMALOG) injection   SubCUTAneous Alden Mcguire MD   2 Units at 19 5404    dextrose 10 % infusion 125-250 mL  125-250 mL IntraVENous PRN Kathy Marroquin MD         Allergies   Allergen Reactions    Pcn [Penicillins] Unknown (comments)     Past Medical History:   Diagnosis Date    Chronic obstructive pulmonary disease ([facility]     Hypertension      No past surgical history on file. Father had MI and  at age 52  Social History     Tobacco Use    Smoking status: Former Smoker    Smokeless tobacco: Never Used   Substance Use Topics    Alcohol use: Not on file          Review of Systems:   Constitutional: Negative for fever, chills, weight loss, + malaise/fatigue. HEENT: Negative for nosebleeds, vision changes.    Respiratory: Negative for cough, hemoptysis  Cardiovascular: Negative for chest pain, palpitations, orthopnea, claudication, + leg swelling, no syncope, and PND. + dizziness, chronic GALO  Gastrointestinal: Negative for nausea, vomiting, diarrhea, blood in stool and melena. Genitourinary: Negative for dysuria, and hematuria. Musculoskeletal: Negative for myalgias, + arthralgia. Skin: Negative for rash. Heme: Does not bleed or bruise easily. Neurological: Negative for speech change and focal weakness     Objective:     Visit Vitals  /73 (BP 1 Location: Left arm, BP Patient Position: At rest)   Pulse 86   Temp 97.8 °F (36.6 °C)   Resp 18   Ht 5' 3\" (1.6 m)   Wt 306 lb 8 oz (139 kg)   SpO2 95%   BMI 54.29 kg/m²      Physical Exam:   Constitutional: well-developed and well-nourished. No respiratory distress. Head: Normocephalic and atraumatic. Eyes: Pupils are equal, round. xanthelasma  ENT: Hearing grossly normal  Neck: supple. No JVD present. Cardiovascular: Normal rate, regular rhythm. Exam reveals no gallop and no friction rub. No murmur heard. Pulmonary/Chest: Effort normal and breath sounds normal. No wheezes. Abdominal: Soft, morbidly obese. Musculoskeletal: Trace edema. Neurological: alert,oriented. Skin: Skin is warm and dry  Psychiatric: Normal mood and affect.  Behavior is normal. Judgment and thought content normal.      BMP:   Lab Results   Component Value Date/Time     09/12/2019 04:57 AM    K 3.8 09/12/2019 04:57 AM     09/12/2019 04:57 AM    CO2 28 09/12/2019 04:57 AM    AGAP 10 09/12/2019 04:57 AM     (H) 09/12/2019 04:57 AM    BUN 27 (H) 09/12/2019 04:57 AM    CREA 1.09 (H) 09/12/2019 04:57 AM    GFRAA >60 09/12/2019 04:57 AM    GFRNA 52 (L) 09/12/2019 04:57 AM     CBC:   Lab Results   Component Value Date/Time    WBC 8.6 09/12/2019 04:57 AM    HGB 13.3 09/12/2019 04:57 AM    HCT 42.0 09/12/2019 04:57 AM     09/12/2019 04:57 AM        Assessment/Plan:   Monomorphic VT episodes associated with dizziness  COPD on oxygen  Morbid obesity  Mild aortic regurgitation  Hypertension  Seizure disorder  hypokalemia   DM2    Ms. Nicolette Muhammad has had recurrent symptomatic monomorphic VT. With COPD & weight, nuclear stress test would be difficult. She is scheduled for cardiac cath later today to evaluate for ischemia as cause. Hypokalemic with initial VT, but normal today after supplementation. Further plan pending results of cardiac cath. Continue to monitor. Currently receiving Toprol XL 25 mg po daily. ERIC Hair [address] [facility]  09/12/19      Addendum from EP attending:   I have seen, examined patient, and discussed with nurse practitioner, registered nurse, reviewed, updated note and agree with the assessment and plan    I have talked to her this am. She is feeling fine, said she was upset when she had VT yesterday  Vital signs are stable  Exam shows regular rhythm    Morbid obesity  Assessment and Plan:  Stress, anger, mildly low potassium all can trigger VT but she had it recurrent and has high risk factors for CAD  I discussed and she agreed with cardiac cath this pm  NPO after breakfast  I have asked Dr Anamaria Bashir to help      Thank you for involving me in this patient's care and please call with further concerns or questions. Chika Louise M.D.   Electrophysiology/Cardiology  [address] [facility]  [address] [place] [address]                                [phone]

## 2019-09-12 NOTE — PROGRESS NOTES
Pulmonary, Critical Care, and Sleep Medicine~Progress Note    Name: Julia Smith MRN: [id number]   : 1960 Hospital: [facility]   Date: 2019 10:26 AM Admission: 2019     Impression Plan   1. Acute on chronic hypercapnic resp failure  2. Seizure disorder  3. S/p tPA for concerns of CVA  4. 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 Active smoker at admission; possibly has obstructive lung disease   6. Obesity  1. Duonebs/brovana/pulmicort; recommend stiolto at discharge  2. On lasix  3. Will need follow up in pulmonary clinic in 1-2 wks with PFTs and sleep clinic. We will help arrange it  4. O2 titration above 90%  5. NIV at night while here. 6. Noted getting left heart cath soon     7. We will be available again to see if needed      Daily Progression:    Sitting up in chair. Breathing better     I have reviewed the labs and previous days notes. Pertinent items are noted in HPI. OBJECTIVE:     Vital Signs:       Visit Vitals  /73 (BP 1 Location: Left arm, BP Patient Position: At rest)   Pulse 86   Temp 97.8 °F (36.6 °C)   Resp 18   Ht 5' 3\" (1.6 m)   Wt 139 kg (306 lb 8 oz)   SpO2 95%   BMI 54.29 kg/m²      Temp (24hrs), Av.2 °F (36.8 °C), Min:97.8 °F (36.6 °C), Max:98.8 °F (37.1 °C)     Intake/Output:     Last shift: No intake/output data recorded.     Last 3 shifts: 09/10 1901 -  0700  In: 1001.3 [I.V.:1001.3]  Out: 300 [Urine:300]          Intake/Output Summary (Last 24 hours) at 2019 0920  Last data filed at 2019 0425  Gross per 24 hour   Intake 0 ml   Output 300 ml   Net -300 ml       Physical Exam:                                        Exam Findings Other   General: No resp distress noted, appears stated age    HEENT:  No ulcers, JVD not elevated, no cervical LAD    Chest: No pectus deformity, normal chest rise b/l    HEART:  RRR, no murmurs/rubs/gallops    Lungs:  CTA b/l, no rhonchi/crackles/wheeze, diminished BS at bases    ABD: Soft/NT, non rigid mildly distended    EXT: No cyanosis/clubbing/edema, normal peripheral pulses    Skin: No rashes or ulcers, no mottling    Neuro: A/O x 3        Medications:  Current Facility-Administered Medications   Medication Dose Route Frequency    metoprolol succinate (TOPROL-XL) XL tablet 25 mg  25 mg Oral DAILY    potassium chloride 10 mEq in 50 ml IVPB  10 mEq IntraVENous ONCE    albuterol-ipratropium (DUO-NEB) 2.5 MG-0.5 MG/3 ML  3 mL Nebulization TID RT    levETIRAcetam (KEPPRA) tablet 500 mg  500 mg Oral BID    arformoterol (BROVANA) neb solution 15 mcg  15 mcg Nebulization BID RT    And    budesonide (PULMICORT) 500 mcg/2 ml nebulizer suspension  500 mcg Nebulization BID RT    aspirin chewable tablet 81 mg  81 mg Oral DAILY    furosemide (LASIX) injection 40 mg  40 mg IntraVENous DAILY    famotidine (PF) (PEPCID) 20 mg in sodium chloride 0.9% 10 mL injection  20 mg IntraVENous Q12H    fentaNYL citrate (PF) injection 25 mcg  25 mcg IntraVENous Q4H PRN    sodium chloride (NS) flush 5-40 mL  5-40 mL IntraVENous Q8H    sodium chloride (NS) flush 5-40 mL  5-40 mL IntraVENous PRN    bisacodyl (DULCOLAX) suppository 10 mg  10 mg Rectal DAILY PRN    LORazepam (ATIVAN) injection 2 mg  2 mg IntraVENous Q2H PRN    glucose chewable tablet 16 g  4 Tab Oral PRN    glucagon (GLUCAGEN) injection 1 mg  1 mg IntraMUSCular PRN    insulin lispro (HUMALOG) injection   SubCUTAneous Q6H    dextrose 10 % infusion 125-250 mL  125-250 mL IntraVENous PRN       Labs:  ABG Recent Labs     09/10/19  0412   PHI 7.342*   PCO2I 54.7*   PO2I 80   HCO3I 29.6*   SO2I 95   FIO2I 35        CBC Recent Labs     09/12/19  0457 09/11/19  0334 09/10/19  0618   WBC 8.6 10.6 8.4   HGB 13.3 13.7 12.0   HCT 42.0 43.0 38.1    248 213   MCV 98.4 97.5 98.7   MCH 31.1 31.1 88.2        Metabolic  Panel Recent Labs     09/12/19  0457 09/11/19  1816 09/11/19  0334 09/09/19  1242    142 143   < > 146*   K 3.8 3.7 3.2*   < > 3.7    104 104   < > 108   CO2 28 27 29   < > 32   * 137* 143*   < > 139*   BUN 27* 22* 18   < > 9   CREA 1.09* 0.98 1.03*   < > 0.86   CA 9.3 9.2 9.5   < > 8.6   MG  --  2.1  --   --   --    ALB 3.4*  --  3.4*  --  3.4*   SGOT 29  --  30  --  30   ALT 45  --  39  --  31    < > = values in this interval not displayed.         Pertinent Labs                Mikhail Wong  9/12/2019

## 2019-09-12 NOTE — PROGRESS NOTES
TRANSFER - OUT REPORT:    Verbal report given to Joel Champion RN on Dee Pta being transferred to [facility] for routine progression of care       Report consisted of patients Situation, Background, Assessment and   Recommendations(SBAR). Information from the following report(s) Procedure Summary, MAR and Recent Results was reviewed with the receiving nurse. Opportunity for questions and clarification was provided.

## 2019-09-12 NOTE — PROGRESS NOTES
Bedside and Verbal shift change report given to Ross Sutton RN (oncoming nurse) by Zita King RN (offgoing nurse). Report included the following information SBAR, Kardex, Procedure Summary, Intake/Output, MAR, Accordion, Recent Results and Med Rec Status. Pt has call bell within reach and knows to call out with needs. Problem: Falls - Risk of  Goal: *Absence of Falls  Description  Document Miguelina Tan Fall Risk and appropriate interventions in the flowsheet.   Outcome: Progressing Towards Goal  Note:   Fall Risk Interventions:  Mobility Interventions: Bed/chair exit alarm, Patient to call before getting OOB    Mentation Interventions: Bed/chair exit alarm, Room close to nurse's station    Medication Interventions: Evaluate medications/consider consulting pharmacy, Patient to call before getting OOB, Teach patient to arise slowly    Elimination Interventions: Call light in reach, Patient to call for help with toileting needs, Stay With Me (per policy), Toilet paper/wipes in reach, Toileting schedule/hourly rounds    History of Falls Interventions: Door open when patient unattended, Room close to nurse's station

## 2019-09-12 NOTE — PROGRESS NOTES
Problem: Gas Exchange - Impaired  Goal: *Absence of hypoxia  Outcome: Progressing Towards Goal     Problem: Falls - Risk of  Goal: *Absence of Falls    Pt on 2L NC, baseline 2L, O2 sats greater than 90%. Bed and chair alarm in place. 2000-Bedside shift change report given to Luz Wallace and Asif BENNETT (oncoming nurse) by Chuyita Isidro RN and Felicia Pedraza (offgoing nurse). Report included the following information SBAR, Kardex, ED Summary, MAR, Accordion, Recent Results, Med Rec Status and Cardiac Rhythm NSR with 3 episodes of V-Tach. done

## 2019-09-13 VITALS
BODY MASS INDEX: 51.91 KG/M2 | TEMPERATURE: 98.3 F | HEART RATE: 96 BPM | WEIGHT: 293 LBS | DIASTOLIC BLOOD PRESSURE: 75 MMHG | RESPIRATION RATE: 18 BRPM | SYSTOLIC BLOOD PRESSURE: 162 MMHG | HEIGHT: 63 IN | OXYGEN SATURATION: 96 %

## 2019-09-13 LAB
ALBUMIN SERPL-MCNC: 3.7 G/DL (ref 3.5–5)
ALBUMIN/GLOB SERPL: 0.9 {RATIO} (ref 1.1–2.2)
ALP SERPL-CCNC: 69 U/L (ref 45–117)
ALT SERPL-CCNC: 48 U/L (ref 12–78)
ANION GAP SERPL CALC-SCNC: 6 MMOL/L (ref 5–15)
AST SERPL-CCNC: 26 U/L (ref 15–37)
BILIRUB SERPL-MCNC: 0.5 MG/DL (ref 0.2–1)
BUN SERPL-MCNC: 27 MG/DL (ref 6–20)
BUN/CREAT SERPL: 26 (ref 12–20)
CALCIUM SERPL-MCNC: 9.5 MG/DL (ref 8.5–10.1)
CHLORIDE SERPL-SCNC: 106 MMOL/L (ref 97–108)
CO2 SERPL-SCNC: 29 MMOL/L (ref 21–32)
CREAT SERPL-MCNC: 1.04 MG/DL (ref 0.55–1.02)
ERYTHROCYTE [DISTWIDTH] IN BLOOD BY AUTOMATED COUNT: 12.6 % (ref 11.5–14.5)
GLOBULIN SER CALC-MCNC: 4.1 G/DL (ref 2–4)
GLUCOSE BLD STRIP.AUTO-MCNC: 130 MG/DL (ref 65–100)
GLUCOSE SERPL-MCNC: 151 MG/DL (ref 65–100)
HCT VFR BLD AUTO: 42.9 % (ref 35–47)
HGB BLD-MCNC: 13.3 G/DL (ref 11.5–16)
MCH RBC QN AUTO: 30.3 PG (ref 26–34)
MCHC RBC AUTO-ENTMCNC: 31 G/DL (ref 30–36.5)
MCV RBC AUTO: 97.7 FL (ref 80–99)
NRBC # BLD: 0 K/UL (ref 0–0.01)
NRBC BLD-RTO: 0 PER 100 WBC
PLATELET # BLD AUTO: 292 K/UL (ref 150–400)
PMV BLD AUTO: 11.2 FL (ref 8.9–12.9)
POTASSIUM SERPL-SCNC: 4.2 MMOL/L (ref 3.5–5.1)
PROT SERPL-MCNC: 7.8 G/DL (ref 6.4–8.2)
RBC # BLD AUTO: 4.39 M/UL (ref 3.8–5.2)
SERVICE CMNT-IMP: ABNORMAL
SODIUM SERPL-SCNC: 141 MMOL/L (ref 136–145)
WBC # BLD AUTO: 9.1 K/UL (ref 3.6–11)

## 2019-09-13 PROCEDURE — 74011250637 HC RX REV CODE- 250/637: Performed by: HOSPITALIST

## 2019-09-13 PROCEDURE — 74011250637 HC RX REV CODE- 250/637: Performed by: INTERNAL MEDICINE

## 2019-09-13 PROCEDURE — 74011000250 HC RX REV CODE- 250: Performed by: HOSPITALIST

## 2019-09-13 PROCEDURE — 36415 COLL VENOUS BLD VENIPUNCTURE: CPT

## 2019-09-13 PROCEDURE — 97116 GAIT TRAINING THERAPY: CPT

## 2019-09-13 PROCEDURE — 5A09357 ASSISTANCE WITH RESPIRATORY VENTILATION, LESS THAN 24 CONSECUTIVE HOURS, CONTINUOUS POSITIVE AIRWAY PRESSURE: ICD-10-PCS | Performed by: HOSPITALIST

## 2019-09-13 PROCEDURE — 80053 COMPREHEN METABOLIC PANEL: CPT

## 2019-09-13 PROCEDURE — 97530 THERAPEUTIC ACTIVITIES: CPT

## 2019-09-13 PROCEDURE — 94660 CPAP INITIATION&MGMT: CPT

## 2019-09-13 PROCEDURE — 74011250636 HC RX REV CODE- 250/636: Performed by: HOSPITALIST

## 2019-09-13 PROCEDURE — 94640 AIRWAY INHALATION TREATMENT: CPT

## 2019-09-13 PROCEDURE — 85027 COMPLETE CBC AUTOMATED: CPT

## 2019-09-13 PROCEDURE — 82962 GLUCOSE BLOOD TEST: CPT

## 2019-09-13 RX ORDER — METOPROLOL SUCCINATE 25 MG/1
25 TABLET, EXTENDED RELEASE ORAL DAILY
Qty: 30 TAB | Refills: 0 | Status: SHIPPED | OUTPATIENT
Start: 2019-09-14 | End: 2019-11-21 | Stop reason: SDUPTHER

## 2019-09-13 RX ADMIN — FUROSEMIDE 40 MG: 10 INJECTION, SOLUTION INTRAMUSCULAR; INTRAVENOUS at 09:02

## 2019-09-13 RX ADMIN — IPRATROPIUM BROMIDE AND ALBUTEROL SULFATE 3 ML: .5; 3 SOLUTION RESPIRATORY (INHALATION) at 14:50

## 2019-09-13 RX ADMIN — IPRATROPIUM BROMIDE AND ALBUTEROL SULFATE 3 ML: .5; 3 SOLUTION RESPIRATORY (INHALATION) at 09:04

## 2019-09-13 RX ADMIN — METOPROLOL SUCCINATE 25 MG: 25 TABLET, EXTENDED RELEASE ORAL at 09:00

## 2019-09-13 RX ADMIN — ASPIRIN 81 MG CHEWABLE TABLET 81 MG: 81 TABLET CHEWABLE at 09:00

## 2019-09-13 RX ADMIN — Medication 10 ML: at 05:44

## 2019-09-13 RX ADMIN — LEVETIRACETAM 500 MG: 500 TABLET ORAL at 09:00

## 2019-09-13 RX ADMIN — BUDESONIDE 500 MCG: 0.5 INHALANT RESPIRATORY (INHALATION) at 09:04

## 2019-09-13 NOTE — ROUTINE PROCESS
Rolling walker order, MD note and PT note to be sent to [facility]. Awaiting approval. [facility] [address] to deliver before d/c. The Rehabilitation department at [facility] has ordered the following durable medical equipment (DME):   Bariatric Caren Carpenter  From:  [facility] [address] [phone]  If the rehab department or DME company is waiting for insurance approval for the equipment and the patient decides to discharge from the hospital before the medical equipment arrives, the patient may contact the company above to work out the delivery. Please keep in mind that some DME companies WILL NOT deliver to the home. Insurance companies and DME companies are not open on the weekends to approve authorization and deliver to the hospital. Therefore it is the patient's responsibility to figure out a way to access the DME medical equipment. Thank you so much for your help as we provide the equipment the patient requires.

## 2019-09-13 NOTE — PROGRESS NOTES
Pulmonary, Critical Care, and Sleep Medicine~Progress Note    Name: Christopher Bamberger MRN: [id number]   : 1960 Hospital: [facility]   Date: 2019 10:26 AM Admission: 2019     Impression Plan   1. Acute on chronic hypercapnic resp failure  2. Seizure disorder  3. S/p tPA for concerns of CVA  4. 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 Active smoker at admission; possibly has obstructive lung disease   6. Obesity  1. Duonebs/brovana/pulmicort; recommend stiolto at discharge  2. On lasix  3. Will need follow up in pulmonary clinic in 1-2 wks with PFTs and sleep clinic. We will help arrange it  4. O2 titration above 90%  5. NIV at night while here. 6. Noted left heart cath results   7. We will be available again to see if needed      Daily Progression:    Sitting up in chair. Breathing better     I have reviewed the labs and previous days notes. Pertinent items are noted in HPI. OBJECTIVE:     Vital Signs:       Visit Vitals  /75   Pulse 96   Temp 98.3 °F (36.8 °C)   Resp 18   Ht 5' 3\" (1.6 m)   Wt 139.4 kg (307 lb 5.1 oz)   SpO2 96%   BMI 54.44 kg/m²      Temp (24hrs), Av.2 °F (36.8 °C), Min:97.6 °F (36.4 °C), Max:98.4 °F (36.9 °C)     Intake/Output:     Last shift: No intake/output data recorded.     Last 3 shifts:  1901 -  0700  In: 2394.6 [P.O.:480; I.V.:1914.6]  Out: 950 [Urine:950]          Intake/Output Summary (Last 24 hours) at 2019 0918  Last data filed at 2019 0423  Gross per 24 hour   Intake 2154.58 ml   Output 750 ml   Net 1404.58 ml       Physical Exam:                                        Exam Findings Other   General: No resp distress noted, appears stated age    [de-identified]:  No ulcers, JVD not elevated, no cervical LAD    Chest: No pectus deformity, normal chest rise b/l    HEART:  RRR, no murmurs/rubs/gallops    Lungs:  CTA b/l, no rhonchi/crackles/wheeze, diminished BS at bases ABD: Soft/NT, non rigid mildly distended    EXT: No cyanosis/clubbing/edema, normal peripheral pulses    Skin: No rashes or ulcers, no mottling    Neuro: A/O x 3        Medications:  Current Facility-Administered Medications   Medication Dose Route Frequency    metoprolol succinate (TOPROL-XL) XL tablet 25 mg  25 mg Oral DAILY    0.9% sodium chloride infusion 500 mL  500 mL IntraVENous CONTINUOUS    sodium chloride (NS) flush 5-40 mL  5-40 mL IntraVENous Q8H    sodium chloride (NS) flush 5-40 mL  5-40 mL IntraVENous PRN    albuterol-ipratropium (DUO-NEB) 2.5 MG-0.5 MG/3 ML  3 mL Nebulization TID RT    levETIRAcetam (KEPPRA) tablet 500 mg  500 mg Oral BID    arformoterol (BROVANA) neb solution 15 mcg  15 mcg Nebulization BID RT    And    budesonide (PULMICORT) 500 mcg/2 ml nebulizer suspension  500 mcg Nebulization BID RT    aspirin chewable tablet 81 mg  81 mg Oral DAILY    furosemide (LASIX) injection 40 mg  40 mg IntraVENous DAILY    fentaNYL citrate (PF) injection 25 mcg  25 mcg IntraVENous Q4H PRN    sodium chloride (NS) flush 5-40 mL  5-40 mL IntraVENous Q8H    sodium chloride (NS) flush 5-40 mL  5-40 mL IntraVENous PRN    bisacodyl (DULCOLAX) suppository 10 mg  10 mg Rectal DAILY PRN    LORazepam (ATIVAN) injection 2 mg  2 mg IntraVENous Q2H PRN    glucose chewable tablet 16 g  4 Tab Oral PRN    glucagon (GLUCAGEN) injection 1 mg  1 mg IntraMUSCular PRN    insulin lispro (HUMALOG) injection   SubCUTAneous Q6H    dextrose 10 % infusion 125-250 mL  125-250 mL IntraVENous PRN       Labs:  ABG No results for input(s): PHI, PCO2I, PO2I, HCO3I, SO2I, FIO2I in the last 72 hours.      CBC Recent Labs     09/13/19 0206 09/12/19 0457 09/11/19  0334   WBC 9.1 8.6 10.6   HGB 13.3 13.3 13.7   HCT 42.9 42.0 43.0    267 248   MCV 97.7 98.4 97.5   MCH 30.3 31.1 33.4        Metabolic  Panel Recent Labs     09/13/19 0206 09/12/19 0457 09/11/19  1816 09/11/19  0334    143 142 143   K 4.2 3.8 3.7 3.2*    105 104 104   CO2 29 28 27 29   * 131* 137* 143*   BUN 27* 27* 22* 18   CREA 1.04* 1.09* 0.98 1.03*   CA 9.5 9.3 9.2 9.5   MG  --   --  2.1  --    ALB 3.7 3.4*  --  3.4*   SGOT 26 29  --  30   ALT 48 45  --  39        Pertinent Labs                Donis Magallanesma  9/13/2019

## 2019-09-13 NOTE — DISCHARGE INSTRUCTIONS
-No Driving x 6 months seizure free  -Follow up with Dr. Carley Forbes in clinic at next available (Jan or Feb). Please call for Keppra refills or with any questions. [phone]. Discharge Instructions       PATIENT ID: Hali Lombardo  MRN: [id number]   YOB: 1960    DATE OF ADMISSION: 9/5/2019  8:45 PM    DATE OF DISCHARGE: 9/13/2019    PRIMARY CARE PROVIDER: None     ATTENDING PHYSICIAN: Neyda Corbin MD  DISCHARGING PROVIDER: Cruzito Zapien MD    To contact this individual call [phone] and ask the  to page. If unavailable ask to be transferred the Adult Hospitalist Department. DISCHARGE DIAGNOSES   Acute on chronic hypoxic hypercapnic respiratory failure due to acute COPD exacerbation, obesity hypoventilation and possible CORWIN   Acute COPD exacerbation   Possible Seizure  Suspected acute CVA, left sided neglect s/p tpA  Acute encephalopathy POA  HTN  T2DM new diagnosis  Hypokalemia   Hypernatremia   HTN  Hx of lower extremities edema  Hx of CORWIN    CONSULTATIONS: IP CONSULT TO INTENSIVIST  IP CONSULT TO NEUROLOGY  IP CONSULT TO NEUROLOGY  IP CONSULT TO CARDIOLOGY    PROCEDURES/SURGERIES: * No surgery found *    PENDING TEST RESULTS:   At the time of discharge the following test results are still pending: None    FOLLOW UP APPOINTMENTS:   Follow-up Information     Follow up With Specialties Details Why Contact Info   1. Primary Care Physician   In one week  None ([phone]) Patient stated that they have no PCP       2. Follow up with Dr. Carley Forbes in clinic at next available (Jan or Feb). Please call for Keppra refills or with any questions. [phone] Shore, MD, Electrophysiology/Cardiology  [facility] and [facility] [place]  [address]                                [phone]        4.  Roxanna Demarco MD, Pulmonologist in one 1-2 weeks      ADDITIONAL CARE RECOMMENDATIONS: No Driving x 6 months seizure free    DIET: Diabetic Diet    ACTIVITY: Activity as tolerated    WOUND CARE: None    EQUIPMENT needed: None      DISCHARGE MEDICATIONS:   See Medication Reconciliation Form    · It is important that you take the medication exactly as they are prescribed. · Keep your medication in the bottles provided by the pharmacist and keep a list of the medication names, dosages, and times to be taken in your wallet. · Do not take other medications without consulting your doctor. NOTIFY YOUR PHYSICIAN FOR ANY OF THE FOLLOWING:   Fever over 101 degrees for 24 hours. Chest pain, shortness of breath, fever, chills, nausea, vomiting, diarrhea, change in mentation, falling, weakness, bleeding. Severe pain or pain not relieved by medications. Or, any other signs or symptoms that you may have questions about.       DISPOSITION:   x Home With:  x OT x PT x HH x RN       SNF/Inpatient Rehab/LTAC    Independent/assisted living    Hospice    Other:     CDMP Checked:   Yes x     PROBLEM LIST Updated:  Yes x       Signed:   Jens Collins MD  9/13/2019  2:12 PM

## 2019-09-13 NOTE — PROGRESS NOTES
LUIS; Discharge home today    Pt to discharge home. CM talked with pt about follow appointment with transitional PCP. CM put information on AVS for pt to contact other physicians to make follow up appts. Pt's roommates will provide her transportation home. They will bring portable O2 for pt to use. Pt was told to contact Dr. Vandana King for the [facility] and short term disability.     Yan Samaniego RN  ACM CRM

## 2019-09-13 NOTE — PROGRESS NOTES
Bedside shift change report given to Sydney Flor RN (oncoming nurse) by Juanito Crandall RN (offgoing nurse). Report included the following information SBAR, Kardex, MAR, Accordion, Recent Results and Cardiac Rhythm NSR/Sinus Tach.

## 2019-09-13 NOTE — PROGRESS NOTES
Cardiac Electrophysiology Hospital Progress Note   REFERRING PROVIDER: Dr Marina Singh  Subjective:      Hali Lombardo is a 64 y.o. patient who is seen for evaluation/management of monomorphic ventricular tachycardia. She had several runs up to 30 beats, symptomatic with dizziness. This occurred just prior to planned discharge. She did desat a couple hours prior to VT while off oxygen, but had resumed it before VT occurred. K 3.2 at the time, supplemented. Chronic leg edema & dyspnea, was previously told that she has a large heart, reports negative stress test several years ago. Cardiac cath with Dr. Mago King 9/12/2019 did not show CAD  She was hypoxemic coming in to cath lab     This am she said she does not have lung doctor  She has home O2    Previous:  Echo (09/07/2019): LVEF 56-60%, no RWMA, mild concentric LVH, grade 2 diastolic dysfunction. Mildly dilated LA. Severely elevated CVP. Mild AR. COPD, on home oxygen. No history CAD.           Patient Active Problem List   Diagnosis Code    Seizure ([facility] R56.9    Altered mental status R41.82    Elevated serum creatinine R79.89    Left-sided weakness R53.1    Uncontrolled hypertension I10    VT (ventricular tachycardia) ([facility]) I47.2     Current Facility-Administered Medications   Medication Dose Route Frequency Provider Last Rate Last Dose    metoprolol succinate (TOPROL-XL) XL tablet 25 mg  25 mg Oral DAILY Francesco Cuevas MD   25 mg at 09/13/19 0900    0.9% sodium chloride infusion 500 mL  500 mL IntraVENous CONTINUOUS Trudy Easley  mL/hr at 09/12/19 1656 500 mL at 09/12/19 1656    sodium chloride (NS) flush 5-40 mL  5-40 mL IntraVENous Q8H Trudy Easley MD   10 mL at 09/13/19 0544    sodium chloride (NS) flush 5-40 mL  5-40 mL IntraVENous PRN Trudy Easley MD        albuterol-ipratropium (DUO-NEB) 2.5 MG-0.5 MG/3 ML  3 mL Nebulization TID RT Neyda Corbin MD   3 mL at 09/13/19 0904    levETIRAcetam (KEPPRA) tablet 500 mg  500 mg Oral BID Kareen Bruno MD   500 mg at 19 0900    arformoterol (BROVANA) neb solution 15 mcg  15 mcg Nebulization BID RT Carmela Lockhart MD   Stopped at 19 2100    And    budesonide (PULMICORT) 500 mcg/2 ml nebulizer suspension  500 mcg Nebulization BID RT Carmela Lockhart MD   500 mcg at 19 8758    aspirin chewable tablet 81 mg  81 mg Oral DAILY Carmela Lockhart MD   81 mg at 19 0900    furosemide (LASIX) injection 40 mg  40 mg IntraVENous DAILY Carmela Lockhart MD   40 mg at 19 0902    fentaNYL citrate (PF) injection 25 mcg  25 mcg IntraVENous Q4H PRN Tiajuana Goodell, MD   25 mcg at 19 2103    sodium chloride (NS) flush 5-40 mL  5-40 mL IntraVENous Q8H Jerald Andersen MD   10 mL at 19 0544    sodium chloride (NS) flush 5-40 mL  5-40 mL IntraVENous PRN Jerald Andersen MD        bisacodyl (DULCOLAX) suppository 10 mg  10 mg Rectal DAILY PRN Jerald Andersen MD        LORazepam (ATIVAN) injection 2 mg  2 mg IntraVENous Q2H PRN Bhaskar Painting MD   2 mg at 19 1048    glucose chewable tablet 16 g  4 Tab Oral PRN Jerald Andersen MD        glucagon (GLUCAGEN) injection 1 mg  1 mg IntraMUSCular PRN Jerald Andersen MD        insulin lispro (HUMALOG) injection   SubCUTAneous Pat Love MD   Stopped at 19 1200    dextrose 10 % infusion 125-250 mL  125-250 mL IntraVENous PRN Jerald Andersen MD         Allergies   Allergen Reactions    Pcn [Penicillins] Unknown (comments)     Past Medical History:   Diagnosis Date    Chronic obstructive pulmonary disease ([facility]     Hypertension      No past surgical history on file. Father had MI and  at age 52  Social History     Tobacco Use    Smoking status: Former Smoker    Smokeless tobacco: Never Used   Substance Use Topics    Alcohol use: Not on file          Review of Systems:   Constitutional: Negative for fever, chills, weight loss, + malaise/fatigue.    HEENT: Negative for nosebleeds, vision changes. Respiratory: Negative for cough, hemoptysis  Cardiovascular: Negative for chest pain, palpitations, orthopnea, claudication, + leg swelling, no syncope, and PND. + dizziness, chronic GALO  Gastrointestinal: Negative for nausea, vomiting, diarrhea, blood in stool and melena. Genitourinary: Negative for dysuria, and hematuria. Musculoskeletal: Negative for myalgias, + arthralgia. Skin: Negative for rash. Heme: Does not bleed or bruise easily. Neurological: Negative for speech change and focal weakness     Objective:     Visit Vitals  /75   Pulse 96   Temp 98.3 °F (36.8 °C)   Resp 18   Ht 5' 3\" (1.6 m)   Wt 307 lb 5.1 oz (139.4 kg)   SpO2 96%   BMI 54.44 kg/m²      Physical Exam:   Constitutional: well-developed and well-nourished. No respiratory distress. Head: Normocephalic and atraumatic. Eyes: Pupils are equal, round. ENT: Hearing grossly normal  Neck: supple. No JVD present. Cardiovascular: Normal rate, regular rhythm. Exam reveals no gallop and no friction rub. No murmur heard. Pulmonary/Chest: Effort normal and breath sounds normal. No wheezes. Abdominal: Soft, morbidly obese. Musculoskeletal: Trace edema. Neurological: alert, oriented. Skin: Skin is warm and dry  Psychiatric: Normal mood and affect.  Behavior is normal. Judgment and thought content normal.      BMP:   Lab Results   Component Value Date/Time     09/13/2019 02:06 AM    K 4.2 09/13/2019 02:06 AM     09/13/2019 02:06 AM    CO2 29 09/13/2019 02:06 AM    AGAP 6 09/13/2019 02:06 AM     (H) 09/13/2019 02:06 AM    BUN 27 (H) 09/13/2019 02:06 AM    CREA 1.04 (H) 09/13/2019 02:06 AM    GFRAA >60 09/13/2019 02:06 AM    GFRNA 55 (L) 09/13/2019 02:06 AM     CBC:   Lab Results   Component Value Date/Time    WBC 9.1 09/13/2019 02:06 AM    HGB 13.3 09/13/2019 02:06 AM    HCT 42.9 09/13/2019 02:06 AM     09/13/2019 02:06 AM        Assessment/Plan:   Monomorphic VT episodes associated with dizziness, no significant CAD  COPD on oxygen  Morbid obesity  Mild aortic regurgitation  Hypertension  Seizure disorder  hypokalemia   DM2    Ms. Tariq Ponce has had recurrent symptomatic monomorphic VT. With COPD and CORWIN, she needs pulmonary consult and treatment  Continue with toprol  Will make appt for clinic follow up  If VT is recurrent despite adequate O2 sat then she needs more treatment  I have discussed with her and Dr Celso Flannery today  She may go home and follow up with me in office    Thank you for involving me in this patient's care and please call with further concerns or questions. Rosa Elena Forbes M.D.   Electrophysiology/Cardiology  [facility] and [facility] [place]  [address] [place] [address]                                [phone]

## 2019-09-13 NOTE — PROGRESS NOTES
Patient is seen  Full note to follow  Will follow up on VT in office  COPD and CORWIN  Home O2  Need pulm referral

## 2019-09-13 NOTE — PROGRESS NOTES
PT has had no seizure activity since the beginning of this admission.     Problem: Seizure Disorder (Adult)  Goal: *STG: Remains free of seizure activity  Outcome: Progressing Towards Goal

## 2019-09-13 NOTE — PROGRESS NOTES
Hospitalist Progress Note  Sandip Desai MD  Answering service: [phone] from in house phone  Cell: [phone]      Date of Service:  2019  NAME:  Dee Pat  :  1960  MRN:  [id number]      Admission Summary:     A 56-year-old white female with past medical history of COPD, obstructive sleep apnea, hypertension, morbid obesity, peripheral edema, presented to the emergency department via EMS with reported seizures and altered mental status. Interval history / Subjective:     She said she feels better      Assessment & Plan:     Recurrent  VT on  , Normal Sinus on monitor overnight  -s/p cardiac cath on  normal LVEDP, Normal coronaries, Hypoxemia at presentation resolved with O2 likely from OHS  -continue metoprolol XL 25 mg po daily  -dizziness improved  -ekg normal sinus vent rate 87 bpm non specific st t wave long   -she said her father passed away at age of 52 due to massive MI  -cardiologist on board    Acute on chronic hypoxic hypercapnic respiratory failure due to acute COPD exacerbation, obesity hypoventilation and possible CORWIN   -stable, continue oxygen, pulmicort, brovana, duo neb, oxygen support   -chest x ray on  unchanged mild pulmonary edema. New left basilar atelectasis and small left pleural effusion. -patient home oxygen dependent 2 l/m, monitor pulse ox     Acute COPD exacerbation   -improving, continue on duo neb, oxygen support, pulmicort and brovana, monitor pulse ox  -home oxygen dependent     Possible Seizure  -continue on keppra 500 mg bid  -no seizure since admission  -EEG there are no focal abnormalities, epileptiform discharges, or electrographic seizures seen.   abnormal eeg due to diffuse beta activity  -Neurologist on board    Suspected acute CVA, left sided neglect s/p tpA  -continue on aspirin  -improved   -CT head no acute intracranial process identified  -CTA head no evidence of large vessel occlusion or perfusion abnormality, no hemodynamically significant carotid artery stenosis. Acute encephalopathy POA  -improving, patient is conscious and alert, oriented to place and person, has on and off confusion and she is forgetful  -no leukocytosis, fever  -alcohol <10  -continue neuro check and supportive care    HTN  -BP normal, continue on losartan, monitor BP    T2DM new diagnosis  -A1c 7.2  -continue sliding scale and monitor finger stick glucose  -continue metformin on discharge     Hypokalemia   -resolved    Hypernatremia   -improved    HTN  -BP not at goal, continue losartan, metoprolol is added, monitor BP    Hx of lower extremities edema  -continue on lasix    Hx of CORWIN  -continue CPAP q hs      Code status: Full Code  DVT prophylaxis: SCD    Care Plan discussed with: Patient/Family and Nurse  Disposition: home with home health  I called and updated her sister, Arlin Rubin at , questions answered on 9/11     Hospital Problems  Never Reviewed          Codes Class Noted POA    [facility] ICD-10-CM: R56.9  ICD-9-CM: 780.39  9/5/2019 Unknown        Altered mental status ICD-10-CM: R41.82  ICD-9-CM: 780.97  9/5/2019 Unknown        Elevated serum creatinine ICD-10-CM: R79.89  ICD-9-CM: 790.99  9/5/2019 Unknown        Left-sided weakness ICD-10-CM: R53.1  ICD-9-CM: 728.87  9/5/2019 Unknown        Uncontrolled hypertension ICD-10-CM: I10  ICD-9-CM: 401.9  9/5/2019 Unknown        * (Principal) VT (ventricular tachycardia) ([facility] ICD-10-CM: I47.2  ICD-9-CM: 427.1  9/5/2019     Overview Signed 9/12/2019  6:50 AM by Troy Kapoor MD     Added automatically from request for surgery [id number]                   Vital Signs:    Last 24hrs VS reviewed since prior progress note.  Most recent are:  Visit Vitals  /75   Pulse 96   Temp 98.3 °F (36.8 °C)   Resp 18   Ht 5' 3\" (1.6 m)   Wt 139.4 kg (307 lb 5.1 oz)   SpO2 96%   BMI 54.44 kg/m²         Intake/Output Summary (Last 24 hours) at 9/13/2019 1033  Last data filed at 9/13/2019 0423  Gross per 24 hour   Intake 2154.58 ml   Output 750 ml   Net 1404.58 ml        Physical Examination:             Constitutional:  No acute distress, cooperative, pleasant    ENT:  Oral mucous moist, oropharynx benign. Neck supple,    Resp:  CTA bilaterally. No wheezing/rhonchi/rales. No accessory muscle use   CV:  Regular rhythm, normal rate, no murmurs, gallops, rubs    GI:  Soft, non distended, non tender. normoactive bowel sounds, no hepatosplenomegaly     Musculoskeletal:  No edema    Neurologic:  Conscious and alert, oriented to place, time and person, moves all extremities, CN II-XII reviewed     Skin:  Good turgor, no rashes or ulcers       Data Review:    Review and/or order of clinical lab test  Review and/or order of tests in the radiology section of CPT  Review and/or order of tests in the medicine section of [facility]      Labs:     Recent Labs     09/13/19 0206 09/12/19 0457   WBC 9.1 8.6   HGB 13.3 13.3   HCT 42.9 42.0    267     Recent Labs     09/13/19 0206 09/12/19 0457 09/11/19  1816    143 142   K 4.2 3.8 3.7    105 104   CO2 29 28 27   BUN 27* 27* 22*   CREA 1.04* 1.09* 0.98   * 131* 137*   CA 9.5 9.3 9.2   MG  --   --  2.1     Recent Labs     09/13/19 0206 09/12/19 0457 09/11/19  0334   SGOT 26 29 30   ALT 48 45 39   AP 69 59 60   TBILI 0.5 0.8 0.8   TP 7.8 7.2 7.3   ALB 3.7 3.4* 3.4*   GLOB 4.1* 3.8 3.9     No results for input(s): INR, PTP, APTT in the last 72 hours. No lab exists for component: INREXT, INREXT   No results for input(s): FE, TIBC, PSAT, FERR in the last 72 hours. No results found for: FOL, RBCF   No results for input(s): PH, PCO2, PO2 in the last 72 hours. No results for input(s): CPK, CKNDX, TROIQ in the last 72 hours.     No lab exists for component: CPKMB  Lab Results   Component Value Date/Time    Cholesterol, total 175 09/06/2019 08:56 AM    HDL Cholesterol 39 09/06/2019 08:56 AM LDL, calculated 97.8 09/06/2019 08:56 AM    Triglyceride 191 (H) 09/06/2019 08:56 AM    CHOL/HDL Ratio 4.5 09/06/2019 08:56 AM     Lab Results   Component Value Date/Time    Glucose (POC) 130 (H) 09/13/2019 05:41 AM    Glucose (POC) 140 (H) 09/12/2019 11:11 PM    Glucose (POC) 116 (H) 09/12/2019 05:41 PM    Glucose (POC) 124 (H) 09/12/2019 12:17 PM    Glucose (POC) 149 (H) 09/12/2019 06:14 AM     No results found for: COLOR, APPRN, SPGRU, REFSG, RAMIRO, PROTU, GLUCU, KETU, BILU, UROU, DEAN, LEUKU, GLUKE, EPSU, BACTU, WBCU, RBCU, CASTS, UCRY      Medications Reviewed:     Current Facility-Administered Medications   Medication Dose Route Frequency    metoprolol succinate (TOPROL-XL) XL tablet 25 mg  25 mg Oral DAILY    0.9% sodium chloride infusion 500 mL  500 mL IntraVENous CONTINUOUS    sodium chloride (NS) flush 5-40 mL  5-40 mL IntraVENous Q8H    sodium chloride (NS) flush 5-40 mL  5-40 mL IntraVENous PRN    albuterol-ipratropium (DUO-NEB) 2.5 MG-0.5 MG/3 ML  3 mL Nebulization TID RT    levETIRAcetam (KEPPRA) tablet 500 mg  500 mg Oral BID    arformoterol (BROVANA) neb solution 15 mcg  15 mcg Nebulization BID RT    And    budesonide (PULMICORT) 500 mcg/2 ml nebulizer suspension  500 mcg Nebulization BID RT    aspirin chewable tablet 81 mg  81 mg Oral DAILY    furosemide (LASIX) injection 40 mg  40 mg IntraVENous DAILY    fentaNYL citrate (PF) injection 25 mcg  25 mcg IntraVENous Q4H PRN    sodium chloride (NS) flush 5-40 mL  5-40 mL IntraVENous Q8H    sodium chloride (NS) flush 5-40 mL  5-40 mL IntraVENous PRN    bisacodyl (DULCOLAX) suppository 10 mg  10 mg Rectal DAILY PRN    LORazepam (ATIVAN) injection 2 mg  2 mg IntraVENous Q2H PRN    glucose chewable tablet 16 g  4 Tab Oral PRN    glucagon (GLUCAGEN) injection 1 mg  1 mg IntraMUSCular PRN    insulin lispro (HUMALOG) injection   SubCUTAneous Q6H    dextrose 10 % infusion 125-250 mL  125-250 mL IntraVENous PRN ______________________________________________________________________  EXPECTED LENGTH OF STAY: 5d 0h  ACTUAL LENGTH OF STAY:          8                 Felipe Aponte MD

## 2019-09-13 NOTE — PROGRESS NOTES
Problem: Mobility Impaired (Adult and Pediatric)  Goal: *Acute Goals and Plan of Care (Insert Text)  Description  FUNCTIONAL STATUS PRIOR TO ADMISSION: Patient was independent and active without use of DME. Wears 2L O2 at baseline. Denies falls. HOME SUPPORT PRIOR TO ADMISSION: The patient lived with 4 roommates but did not require assist. Works full time. Physical Therapy Goals  Initiated 9/10/2019  1. Patient will move from supine to sit and sit to supine , scoot up and down and roll side to side in bed with modified independence within 7 day(s). 2.  Patient will transfer from bed to chair and chair to bed with modified independence using the least restrictive device within 7 day(s). 3.  Patient will perform sit to stand with modified independence within 7 day(s). 4.  Patient will ambulate with modified independence for 150 feet with the least restrictive device within 7 day(s). 5.  Patient will ascend/descend 3 stairs with handrail(s) with modified independence within 7 day(s). Outcome: Progressing Towards Goal    PHYSICAL THERAPY TREATMENT  Patient: Anju Millan (49 y.o. female)  Date: 9/13/2019  Diagnosis: Altered mental status [R41.82]  Seizure ([address] [R56.9]  Uncontrolled hypertension [I10]  Elevated serum creatinine [R79.89]  Left-sided weakness [R53.1] VT (ventricular tachycardia) ([facility])  Procedure(s) (LRB):  LEFT HEART CATH / CORONARY ANGIOGRAPHY (Right) 1 Day Post-Op  Precautions: Fall  Chart, physical therapy assessment, plan of care and goals were reviewed. ASSESSMENT  Based on the objective data described below, pt continues to make gains with mobility. Had her amb both with and without a walker. Without walker support note some slight gait instability, particularly during changing of direction with pt reaching out for support of nearby wall in the hallway. Gait more stable with the walker for changing of direction.  With continued PT, anticipate gains towards weaning off the walker. Current Level of Function Impacting Discharge (mobility/balance): impaired dynamic standing balance and up to contact guard provided. Other factors to consider for discharge: was independent and working at baseline, used home 02         PLAN :  Patient continues to benefit from skilled intervention to address the above impairments. Continue treatment per established plan of care. to address goals. Recommendation for discharge: (in order for the patient to meet his/her long term goals)  Physical therapy at least 2 days/week in the home     This discharge recommendation:  A follow-up discussion with the attending provider and/or case management is planned    Equipment recommendations for successful discharge (if) home: a bariatric rolling walker order has been entered into pt's chart and will be ordered to be delivered for discharge today       SUBJECTIVE:   Patient stated I feel more stable with the walker.     OBJECTIVE DATA SUMMARY:   Chart checked, pt cleared by nursing  Critical Behavior:  Neurologic State: Alert  Orientation Level: Oriented X4  Cognition: Appropriate decision making, Appropriate for age attention/concentration, Appropriate safety awareness, Follows commands  Safety/Judgement: Awareness of environment  Functional Mobility Training:  Bed Mobility:            Not assessed        Transfers:  Sit to Stand: Supervision  Stand to Sit: Supervision                             Balance:  Sitting: Intact; Without support  Standing: Impaired; Without support  Standing - Static: Good  Standing - Dynamic : Fair  Ambulation/Gait Training:  Distance (ft): 250 Feet (ft)  Assistive Device: Gait belt;Walker, rolling(some amb without a walker)  Ambulation - Level of Assistance: Contact guard assistance        Gait Abnormalities: Decreased step clearance;Trunk sway increased        Base of Support: Widened     Speed/Sharon: Slow  Step Length: Left shortened;Right shortened Stairs: Therapeutic Exercises:     Pain Rating:  None mentioned     Activity Tolerance:   Good and SpO2 stable on 2 liters of 02  Please refer to the flowsheet for vital signs taken during this treatment.     After treatment patient left in no apparent distress:   Sitting in chair and Call bell within reach    COMMUNICATION/COLLABORATION:   The patients plan of care was discussed with: Registered Nurse    Bryan Franco   Time Calculation: 23 mins

## 2019-09-16 ENCOUNTER — TELEPHONE (OUTPATIENT)
Dept: CARDIOLOGY CLINIC | Age: 59
End: 2019-09-16

## 2019-09-16 NOTE — TELEPHONE ENCOUNTER
----- Message from Dante Kumari MD sent at 9/13/2019  9:48 AM EDT -----  VT seen in hospital  Cath negative  Ok with 1 month visit

## 2019-09-18 ENCOUNTER — TELEPHONE (OUTPATIENT)
Dept: FAMILY MEDICINE CLINIC | Age: 59
End: 2019-09-18

## 2019-09-18 ENCOUNTER — TELEPHONE (OUTPATIENT)
Dept: NEUROLOGY | Age: 59
End: 2019-09-18

## 2019-09-18 ENCOUNTER — OFFICE VISIT (OUTPATIENT)
Dept: FAMILY MEDICINE CLINIC | Age: 59
End: 2019-09-18

## 2019-09-18 VITALS
DIASTOLIC BLOOD PRESSURE: 100 MMHG | HEART RATE: 54 BPM | RESPIRATION RATE: 24 BRPM | BODY MASS INDEX: 51.91 KG/M2 | HEIGHT: 63 IN | TEMPERATURE: 97.9 F | WEIGHT: 293 LBS | SYSTOLIC BLOOD PRESSURE: 148 MMHG | OXYGEN SATURATION: 93 %

## 2019-09-18 DIAGNOSIS — J96.22 ACUTE ON CHRONIC RESPIRATORY FAILURE WITH HYPOXIA AND HYPERCAPNIA (HCC): ICD-10-CM

## 2019-09-18 DIAGNOSIS — E66.01 OBESITY, MORBID (HCC): ICD-10-CM

## 2019-09-18 DIAGNOSIS — R60.9 EDEMA, UNSPECIFIED TYPE: ICD-10-CM

## 2019-09-18 DIAGNOSIS — G47.33 OSA AND COPD OVERLAP SYNDROME (HCC): ICD-10-CM

## 2019-09-18 DIAGNOSIS — J44.9 CHRONIC OBSTRUCTIVE PULMONARY DISEASE, UNSPECIFIED COPD TYPE (HCC): ICD-10-CM

## 2019-09-18 DIAGNOSIS — F41.9 ANXIETY: ICD-10-CM

## 2019-09-18 DIAGNOSIS — J44.9 OSA AND COPD OVERLAP SYNDROME (HCC): ICD-10-CM

## 2019-09-18 DIAGNOSIS — I10 ELEVATED BLOOD PRESSURE READING IN OFFICE WITH DIAGNOSIS OF HYPERTENSION: ICD-10-CM

## 2019-09-18 DIAGNOSIS — G89.29 CHRONIC BILATERAL LOW BACK PAIN, WITH SCIATICA PRESENCE UNSPECIFIED: ICD-10-CM

## 2019-09-18 DIAGNOSIS — G47.33 OSA ON CPAP: ICD-10-CM

## 2019-09-18 DIAGNOSIS — Z23 ENCOUNTER FOR IMMUNIZATION: ICD-10-CM

## 2019-09-18 DIAGNOSIS — I10 ESSENTIAL HYPERTENSION: ICD-10-CM

## 2019-09-18 DIAGNOSIS — R41.82 ALTERED MENTAL STATUS, UNSPECIFIED ALTERED MENTAL STATUS TYPE: ICD-10-CM

## 2019-09-18 DIAGNOSIS — J96.21 ACUTE ON CHRONIC RESPIRATORY FAILURE WITH HYPOXIA AND HYPERCAPNIA (HCC): ICD-10-CM

## 2019-09-18 DIAGNOSIS — R56.9 SEIZURE (HCC): Primary | ICD-10-CM

## 2019-09-18 DIAGNOSIS — M54.5 CHRONIC BILATERAL LOW BACK PAIN, WITH SCIATICA PRESENCE UNSPECIFIED: ICD-10-CM

## 2019-09-18 DIAGNOSIS — Z99.89 OSA ON CPAP: ICD-10-CM

## 2019-09-18 DIAGNOSIS — I47.20 VT (VENTRICULAR TACHYCARDIA): ICD-10-CM

## 2019-09-18 PROBLEM — M54.50 CHRONIC BILATERAL LOW BACK PAIN: Status: ACTIVE | Noted: 2019-09-18

## 2019-09-18 RX ORDER — TORSEMIDE 20 MG/1
20 TABLET ORAL 2 TIMES DAILY
COMMUNITY
Start: 2019-07-21 | End: 2021-04-23 | Stop reason: SDUPTHER

## 2019-09-18 RX ORDER — BUSPIRONE HYDROCHLORIDE 10 MG/1
5-20 TABLET ORAL
Qty: 30 TAB | Refills: 0 | Status: SHIPPED | OUTPATIENT
Start: 2019-09-18 | End: 2019-10-06 | Stop reason: SDUPTHER

## 2019-09-18 RX ORDER — CARVEDILOL 6.25 MG/1
6.25 TABLET ORAL 2 TIMES DAILY
COMMUNITY
Start: 2019-07-21 | End: 2019-11-21

## 2019-09-18 RX ORDER — ALBUTEROL SULFATE 0.83 MG/ML
SOLUTION RESPIRATORY (INHALATION)
COMMUNITY
Start: 2019-09-04

## 2019-09-18 NOTE — PROGRESS NOTES
Verbal Order Read Back Per Shawna, Ruth Callahan MD for Flu vaccine QUAD PF, # 1 injection, IM, 0 refills on 2019 due to Need for immunization. Emeka Jackson verified correct name and  with PCP. Immunization/s administered 2019 by Isaiah Junior with patient's consent. Patient tolerated procedure well. No reactions noted.

## 2019-09-18 NOTE — TELEPHONE ENCOUNTER
Called Pt. Two patient identifiers confirmed. Notified Pt about Dr. Adelfa Kayser findings and recommendations. Scheduled to see Dr. Allie Richardson on 10/24/2019 at 3:20. Pt verbalized understanding of information discussed w/ no further questions at this time.

## 2019-09-18 NOTE — TELEPHONE ENCOUNTER
----- Message from Calvin Ceja sent at 9/18/2019 12:42 PM EDT -----  Regarding: Dr. Alvino Lieberman/Telephone  General Message/Vendor Calls    Caller's first and last name:Megan Chin      Reason for call:hosp f/up      Callback required yes/no and why: yes hosp f/up      Best contact number(s): [phone]      Details to clarify the request:Pt would like a call to schedule a hosp f/up appt to [facility] [place].  Pt has a referral from her pcp(Dr. Timo Munoz)      Calvin Ceja

## 2019-09-18 NOTE — PROGRESS NOTES
Nancy Rolle  Identified pt with two pt identifiers(name and ). Chief Complaint   Patient presents with    New Patient   [facility] Follow Up     [facility] [place]    Documentation     Disability paperwork       1. Have you been to the ER, urgent care clinic since your last visit? Hospitalized since your last visit? Yes    2. Have you seen or consulted any other health care providers outside of the [address] [place] [address] since your last visit? Include any pap smears or colon screening. No      Would you like to sign up for MyChart today, if you have not already done so? No  If not, would you like information on MyChart, and how to sign up at a later time? No      Medication reconciliation up to date and corrected with patient at this time. Today's provider has been notified of reason for visit, vitals and flowsheets obtained on patients. Reviewed record in preparation for visit, huddled with provider and have obtained necessary documentation.       Health Maintenance Due   Topic    Hepatitis C Screening     Pneumococcal 0-64 years (1 of 1 - PPSV23)    FOOT EXAM Q1     MICROALBUMIN Q1     EYE EXAM RETINAL OR DILATED     DTaP/Tdap/Td series (1 - Tdap)    PAP AKA CERVICAL CYTOLOGY     Shingrix Vaccine Age 50> (1 of 2)    BREAST CANCER SCRN MAMMOGRAM     FOBT Q 1 YEAR AGE 54-65     Influenza Age 5 to Adult        Wt Readings from Last 3 Encounters:   19 307 lb 9.6 oz (139.5 kg)   19 307 lb 5.1 oz (139.4 kg)   19 326 lb (147.9 kg)     Temp Readings from Last 3 Encounters:   19 97.9 °F (36.6 °C) (Oral)   19 98.3 °F (36.8 °C)     BP Readings from Last 3 Encounters:   19 (!) 148/100   19 162/75     Pulse Readings from Last 3 Encounters:   19 (!) 54   19 96     Vitals:    19 1049   BP: (!) 148/100   Pulse: (!) 54   Resp: 24   Temp: 97.9 °F (36.6 °C)   TempSrc: Oral   SpO2: 93%   Weight: 307 lb 9.6 oz (139.5 kg)   Height: 5' 3\" (1.6 m)   PainSc:   5   PainLoc: Back         Learning Assessment:  :     Learning Assessment 9/18/2019   PRIMARY LEARNER Patient   PRIMARY LANGUAGE ENGLISH   LEARNER PREFERENCE PRIMARY DEMONSTRATION   ANSWERED BY patient   RELATIONSHIP SELF       Depression Screening:  :     3 most recent PHQ Screens 9/18/2019   Little interest or pleasure in doing things Not at all   Feeling down, depressed, irritable, or hopeless Not at all   Total Score PHQ 2 0       Fall Risk Assessment:  :     Fall Risk Assessment, last 12 mths 9/18/2019   Able to walk? Yes   Fall in past 12 months? No       Abuse Screening:  :     Abuse Screening Questionnaire 9/18/2019   Do you ever feel afraid of your partner? N   Are you in a relationship with someone who physically or mentally threatens you? N   Is it safe for you to go home?  Y       ADL Screening:  :     ADL Assessment 9/18/2019   Feeding yourself No Help Needed   Getting from bed to chair No Help Needed   Getting dressed No Help Needed   Bathing or showering No Help Needed   Walk across the room (includes cane/walker) Help Needed   Using the telphone No Help Needed   Taking your medications No Help Needed   Preparing meals No Help Needed   Managing money (expenses/bills) No Help Needed   Moderately strenuous housework (laundry) No Help Needed   Shopping for personal items (toiletries/medicines) No Help Needed   Shopping for groceries No Help Needed   Driving Help Needed   Climbing a flight of stairs No Help Needed   Getting to places beyond walking distances No Help Needed

## 2019-09-18 NOTE — PROGRESS NOTES
Sent over here post discharge. Here in [address] since March. Prev PCP in [place]. Discharged past Friday. Not yet waylon f/u with card, pulm. Chronic LBP from falling off horse age 14. Had been off Metformin for 6 mos. Pbs with anxiety. Never on meds. Visit Vitals  BP (!) 148/100 (BP 1 Location: Right arm, BP Patient Position: Sitting)   Pulse (!) 54   Temp 97.9 °F (36.6 °C) (Oral)   Resp 24   Ht 5' 3\" (1.6 m)   Wt 307 lb 9.6 oz (139.5 kg)   LMP  (LMP Unknown)   SpO2 93% Comment: on 2L/min O2 via NC, continuously   BMI 54.49 kg/m²     Patient alert and cooperative. Reviewed above. Assessment:  1. Recent hospitalization for seizure, V-tach, altered mental status. 2. Has CORWIN and COPD, on chronic oxygen and CPAP. Plan:  1. Completed disability form. 2. Set up referrals to neuro, pulmonary, cardiologist and ortho. 3. Has chronic low back pain from remote injury at age 14.  4. Elevated BP, anxiety. Will try BuSpar. 5. Follow up if persistent elevated BP, otherwise recheck uncontrolled diabetes in three months. TOTAL FACE TO FACE TIME OF 20 MINUTES SPENT WITH PATIENT. GREATER THAN 50% OF TIME WAS SPENT IN COUNSELING AND/OR COORDINATION OF CARE. SEE ASSESSMENT AND PLAN FOR DETAILS.

## 2019-09-18 NOTE — TELEPHONE ENCOUNTER
called Christel with [facility] back regarding patient needing home assistance. Was going to notify that Dr. Rachel Puente believes patient would benefit from home assistance. Christel did not answer.  left  requesting phone call back.

## 2019-09-18 NOTE — TELEPHONE ENCOUNTER
Christel from [facility] called to see if MD think that patient need Home Care assistance. Stated patient was discharged last week from [place] and just wanted to follow up with pcp since she didn't have one until today  P: [phone]

## 2019-09-19 ENCOUNTER — TELEPHONE (OUTPATIENT)
Dept: FAMILY MEDICINE CLINIC | Age: 59
End: 2019-09-19

## 2019-09-19 NOTE — TELEPHONE ENCOUNTER
Writer called patient back regarding physician's statement form. Writer spoke with patient. Patient verified . Writer verbalized to patient that statement was faxed to Cheri Marvin on the same day as her appointment. Patient verbalized understanding and appreciation.

## 2019-09-19 NOTE — TELEPHONE ENCOUNTER
Patient was calling to see if her Attending Physician statement form for Betty Mcbride was fax in yet.  Please give patient a call [phone]

## 2019-09-20 ENCOUNTER — TELEPHONE (OUTPATIENT)
Dept: NEUROLOGY | Age: 59
End: 2019-09-20

## 2019-09-23 ENCOUNTER — TELEPHONE (OUTPATIENT)
Dept: NEUROLOGY | Age: 59
End: 2019-09-23

## 2019-09-23 NOTE — TELEPHONE ENCOUNTER
----- Message from Gini Peabody sent at 9/23/2019 11:44 AM EDT -----  Regarding: Dr. Anisha Kimble  General Message/Vendor Calls    Caller's first and last name:Megan Giuseppe      Reason for call: hosp f/up      Callback required yes/no and why:yes, hosp f/up      Best contact number(s):[phone]      Details to clarify the request: Pt would like a call to schedule a hosp f/up appt.       Gini Peabody

## 2019-09-23 NOTE — TELEPHONE ENCOUNTER
Message from Libia below, okay to schedule pt for next available 1/13/2020 or with Wendy 12/5/2019? Please advise      ----- Message from Manish Jasso sent at 9/19/2019  3:11 PM EDT -----  Regarding: Dr. Basia Galeas  Pt requesting a call back to schedule hospital f/u within 1-2 weeks. Pt was seen by provider for seizures. Best contact [phone].

## 2019-09-23 NOTE — PROGRESS NOTES
TRANSFER - IN REPORT:    Verbal report received from [place] (name) on Carla Acuna  being received from Cath lab (unit) for routine progression of care      Report consisted of patients Situation, Background, Assessment and   Recommendations(SBAR). Information from the following report(s) SBAR, Kardex, OR Summary, Procedure Summary and MAR was reviewed with the receiving nurse. Opportunity for questions and clarification was provided. Assessment completed upon patients arrival to unit and care assumed. arm pain/injury

## 2019-09-23 NOTE — TELEPHONE ENCOUNTER
Milind Henry - She does not need urgent f/u, but see if we can find a cancellation spot for her to see me. Also, it looks like someone sent a referral to Dr. Dominic Osuna to see the pt. Does the pt want to see someone else or is [facility] closer to her, or did her PCP not realize she had already established care with me? I am fine if she wants to see Dr. Dominic Osuna, but she should not be making calls and appts to both of us. Thanks.

## 2019-09-24 ENCOUNTER — TELEPHONE (OUTPATIENT)
Dept: FAMILY MEDICINE CLINIC | Age: 59
End: 2019-09-24

## 2019-09-24 NOTE — TELEPHONE ENCOUNTER
Spoke with patient, scheduled follow up with  for 9/26.  She says if anyone calls from 's office she will tell them to cancel that referral.

## 2019-09-24 NOTE — TELEPHONE ENCOUNTER
Writer called patient back regarding paperwork her friend faxed over yesterday for Dr. Katy Chin. Writer spoke with patient. Patient verified . Patient stated that her friend faxed over paperwork that she forgot to bring on day of visit. Writer verbalized understanding and told patient that paperwork would be gone through to see if it was received or not, and she will be given a phone call back. Patient verbalized understanding and appreciation.

## 2019-09-25 NOTE — TELEPHONE ENCOUNTER
Writer called patient back regarding FMLA form. Writer spoke with patient. Patient verified . Writer confirmed fax number for FMLA form to go to, as well as, who to attention it. Writer verbalized to patient form would be faxed this afternoon. Patient verbalized understanding and appreciation.

## 2019-09-25 NOTE — TELEPHONE ENCOUNTER
Writer faxed FMLA form to fax number provided and attentioned to [address]. Confirmation attached and placed in scan bucket.

## 2019-09-26 ENCOUNTER — OFFICE VISIT (OUTPATIENT)
Dept: NEUROLOGY | Age: 59
End: 2019-09-26

## 2019-09-26 VITALS
HEIGHT: 63 IN | HEART RATE: 76 BPM | BODY MASS INDEX: 51.91 KG/M2 | DIASTOLIC BLOOD PRESSURE: 80 MMHG | WEIGHT: 293 LBS | RESPIRATION RATE: 18 BRPM | SYSTOLIC BLOOD PRESSURE: 140 MMHG | OXYGEN SATURATION: 96 %

## 2019-09-26 DIAGNOSIS — G40.109 LOCALIZATION-RELATED EPILEPSY (HCC): Primary | ICD-10-CM

## 2019-09-26 NOTE — PROGRESS NOTES
Neurology Consult Note      HISTORY PROVIDED BY: patient and friend    Chief Complaint:   Chief Complaint   Patient presents with    Seizure      Subjective:    Rosa Garcia is a 64 y.o. female initially and last seen while hospitalized 9/5/19 - 9/13/19 after presenting with witnessed seizure x 2, given versed by EMS, , , O2 sat 78%. She reportedly had right gaze pref and left sided weakness in ED. Normal CTH, CTA H/N. Received IV TPA. Given Keppra 2000mg IV. MRI brain neg for stroke or epileptogenic focus.  Initial EEG with changes c/w propofol. Pt denied h/o seizure, h/o concussion as a child, no family h/o epilepsy, no h/o CNS infection.  Repeat EEG 9/10/19 with right > left temporal slowing and right FT sharp waves, concerning for underlying epileptogenic focus given presentation. Recommended she start Keppra 500mg bid, discussed DMV regulations -no driving x 6 months. She returns for f/u. She is taking Keppra and has not had any seizures. She is using CPAP nightly, but has not seen a sleep physician in a few years. Has an appt with a pulmonologist tomorrow, Dr. Sabina Mercado. She is doing much better from a cognitive standpoint. No new complaints. Past Medical History:   Diagnosis Date    Chronic obstructive pulmonary disease ([address]     Hypertension     Seizures ([facility] [address]       History reviewed. No pertinent surgical history.    Social History     Socioeconomic History    Marital status: SINGLE     Spouse name: Not on file    Number of children: Not on file    Years of education: Not on file    Highest education level: Not on file   Occupational History    Occupation: Adm Asst   Social Needs    Financial resource strain: Not on file    Food insecurity:     Worry: Not on file     Inability: Not on file    Transportation needs:     Medical: Not on file     Non-medical: Not on file   Tobacco Use    Smoking status: Former Smoker    Smokeless tobacco: Never Used   Substance and Sexual Activity    Alcohol use: Not Currently    Drug use: Not Currently    Sexual activity: Not Currently   Lifestyle    Physical activity:     Days per week: Not on file     Minutes per session: Not on file    Stress: Not on file   Relationships    Social connections:     Talks on phone: Not on file     Gets together: Not on file     Attends Congregation service: Not on file     Active member of club or organization: Not on file     Attends meetings of clubs or organizations: Not on file     Relationship status: Not on file    Intimate partner violence:     Fear of current or ex partner: Not on file     Emotionally abused: Not on file     Physically abused: Not on file     Forced sexual activity: Not on file   Other Topics Concern    Not on file   Social History Narrative    Lives in [facility]      Family History   Problem Relation Age of Onset    Breast Cancer Mother     Heart Attack Father     Hypertension Sister     Hypertension Brother     Cancer Maternal Aunt         \"bone\"         Objective:   Review of Systems   Constitutional: Positive for malaise/fatigue. HENT: Positive for hearing loss. Eyes: Negative. Respiratory: Positive for shortness of breath. Cardiovascular: Positive for leg swelling. Gastrointestinal: Positive for constipation. Genitourinary: Negative. Musculoskeletal: Negative. Skin: Negative. Neurological: Negative. Endo/Heme/Allergies: Negative. Psychiatric/Behavioral: Positive for memory loss. The patient is nervous/anxious. Allergies   Allergen Reactions    Pcn [Penicillins] Unknown (comments)        Meds:  Outpatient Medications Prior to Visit   Medication Sig Dispense Refill    albuterol (PROVENTIL VENTOLIN) 2.5 mg /3 mL (0.083 %) nebu       torsemide (DEMADEX) 20 mg tablet Take 20 mg by mouth two (2) times a day.  carvedilol (COREG) 6.25 mg tablet Take 6.25 mg by mouth two (2) times a day.       metoprolol succinate (TOPROL-XL) 25 mg XL tablet Take 1 Tab by mouth daily. Indications: VT 30 Tab 0    aspirin 81 mg chewable tablet Take 1 Tab by mouth daily. 30 Tab 0    levETIRAcetam (KEPPRA) 500 mg tablet Take 1 Tab by mouth two (2) times a day. 60 Tab 0    metFORMIN (GLUCOPHAGE) 500 mg tablet Take 1 Tab by mouth two (2) times daily (with meals). 60 Tab 0    losartan (COZAAR) 25 mg tablet Take 25 mg by mouth daily.  naproxen (NAPROSYN) 375 mg tablet Take 375 mg by mouth two (2) times daily (with meals).  umeclidinium-vilanterol (ANORO ELLIPTA) 62.5-25 mcg/actuation inhaler Take 1 Puff by inhalation daily.  fluticasone propionate (FLOVENT DISKUS) 250 mcg/actuation dsdv Take 250 mcg by inhalation two (2) times a day.  albuterol (PROVENTIL HFA, VENTOLIN HFA, PROAIR HFA) 90 mcg/actuation inhaler Take 1 Puff by inhalation every six (6) hours as needed for Wheezing.  furosemide (LASIX) 40 mg tablet Take 40 mg by mouth daily.  busPIRone (BUSPAR) 10 mg tablet Take 0.5-2 Tabs by mouth three (3) times daily as needed (anxiety). 30 Tab 0     No facility-administered medications prior to visit. Imaging:  MRI Results (most recent):  Results from Hospital Encounter encounter on 09/05/19   MRI BRAIN WO CONT    Narrative BRAIN MRI WITHOUT CONTRAST: 9/6/2019 9:55 PM    INDICATION: Focal neuro deficit, new, fixed or worsening, 3-24 hours. Left  extremity weakness. COMPARISON: CT head 9/5/2019, CTA head and neck and CT perfusion 9/5/2019. TECHNIQUE: Images were obtained in multiple planes and sequences to emphasize  T1, T2, and T2* information. In addition, diffusion-weighted images and ADC maps  were also obtained. FINDINGS: The ventricles and sulci are appropriate for age. The main  intracranial flow-voids are normal. Scattered periventricular and subcortical  white matter signal abnormalities are consistent with chronic small vessel  ischemic disease. No restricted diffusion to suggest acute infarction. No  hemorrhage. Paranasal sinus mucosal disease is mild. There are bilateral mastoid  effusions. The examination is mildly motion limited. Impression IMPRESSION: No acute intracranial abnormality. CT Results (most recent):     Results from Hospital Encounter encounter on 09/05/19   CT CODE NEURO PERF W CBF    Narrative *PRELIMINARY REPORT*    No acute occlusion    Preliminary report was provided by Dr. Kimberlyn Vick, the on-call radiologist, at 2152  hours    Final report to follow. *END PRELIMINARY REPORT*  CLINICAL HISTORY: Altered mental status, left-sided weakness, seizure. EXAMINATION:  CT ANGIOGRAPHY HEAD AND NECK, CT PERFUSION    COMPARISON: None    TECHNIQUE:  Following the uneventful administration of iodinated contrast  material, axial CT angiography of the head and neck was performed. Delayed axial  images through the head were also obtained. Coronal and sagittal reconstructions  were obtained. Manual postprocessing of images was performed. 3-D  Sagittal  maximal intensity projection images were obtained. 3-D Coronal maximal  intensity projections were obtained. CT brain perfusion was performed with  generation of hemodynamic maps of multiple parameters, including cerebral blood  flow, cerebral blood volume, mean transit time, and TMAX. CT dose reduction was  achieved through use of a standardized protocol tailored for this examination  and automatic exposure control for dose modulation. Adaptive statistical  iterative reconstruction (ASIR) was utilized. FINDINGS:    Delayed contrast-enhanced head CT:    The ventricles are midline without hydrocephalus. There is no acute intra or  extra-axial hemorrhage. The basal cisterns are clear. The paranasal sinuses are  clear. CTA NECK:    Great vessels: Normal arch anatomy with the origins patent.     Right subclavian artery: Patent    Left subclavian artery: Patent     Right common carotid artery: Patent     Left common carotid artery: Patent     Cervical right internal carotid artery: Patent with no significant stenosis by  NASCET criteria. Cervical left internal carotid artery: Mixed plaque at the carotid bifurcation  with less than 50% stenosis by NASCET criteria. Right vertebral artery: Patent    Left vertebral artery: Patent    The lung apices are clear. The thyroid is homogeneous. No cervical  lymphadenopathy. CTA HEAD:    Right cavernous internal carotid artery: Patent    Left cavernous internal carotid artery: Patent    Anterior cerebral arteries: Patent    Anterior communicating artery: Patent    Right middle cerebral artery: Patent    Left middle cerebral artery: Patent    Posterior communicating arteries: Diminutive    Posterior cerebral arteries: Patent    Basilar artery: Patent    Distal vertebral arteries: Patent    No evidence for intracranial aneurysm or hemodynamically significant stenosis. CT Perfusion:  Normal CT perfusion. Impression IMPRESSION:  No evidence of large vessel occlusion or perfusion abnormality. No hemodynamically significant carotid artery stenosis. Reviewed records in [employer] and [employer] today    Lab Review   Results for orders placed or performed during the hospital encounter of 09/05/19   CBC WITH AUTOMATED DIFF   Result Value Ref Range    WBC 16.7 (H) 3.6 - 11.0 K/uL    RBC 4.64 3.80 - 5.20 M/uL    HGB 14.4 11.5 - 16.0 g/dL    HCT 45.5 35.0 - 47.0 %    MCV 98.1 80.0 - 99.0 FL    MCH 31.0 26.0 - 34.0 PG    MCHC 31.6 30.0 - 36.5 g/dL    RDW 12.4 11.5 - 14.5 %    PLATELET 203 308 - 461 K/uL    MPV 11.4 8.9 - 12.9 FL    NRBC 0.0 0  WBC    ABSOLUTE NRBC 0.00 0.00 - 0.01 K/uL    NEUTROPHILS 74 32 - 75 %    LYMPHOCYTES 15 12 - 49 %    MONOCYTES 7 5 - 13 %    EOSINOPHILS 1 0 - 7 %    BASOPHILS 1 0 - 1 %    IMMATURE GRANULOCYTES 2 (H) 0.0 - 0.5 %    ABS. NEUTROPHILS 12.3 (H) 1.8 - 8.0 K/UL    ABS. LYMPHOCYTES 2.5 0.8 - 3.5 K/UL    ABS. MONOCYTES 1.2 (H) 0.0 - 1.0 K/UL    ABS.  EOSINOPHILS 0.2 0.0 - 0.4 K/UL    ABS. BASOPHILS 0.2 (H) 0.0 - 0.1 K/UL    ABS. IMM. GRANS. 0.3 (H) 0.00 - 0.04 K/UL    DF SMEAR SCANNED      RBC COMMENTS MACROCYTOSIS  1+       METABOLIC PANEL, COMPREHENSIVE   Result Value Ref Range    Sodium 140 136 - 145 mmol/L    Potassium 3.7 3.5 - 5.1 mmol/L    Chloride 101 97 - 108 mmol/L    CO2 32 21 - 32 mmol/L    Anion gap 7 5 - 15 mmol/L    Glucose 233 (H) 65 - 100 mg/dL    BUN 20 6 - 20 MG/DL    Creatinine 1.13 (H) 0.55 - 1.02 MG/DL    BUN/Creatinine ratio 18 12 - 20      GFR est AA >60 >60 ml/min/1.73m2    GFR est non-AA 50 (L) >60 ml/min/1.73m2    Calcium 9.3 8.5 - 10.1 MG/DL    Bilirubin, total 0.5 0.2 - 1.0 MG/DL    ALT (SGPT) 25 12 - 78 U/L    AST (SGOT) 14 (L) 15 - 37 U/L    Alk.  phosphatase 91 45 - 117 U/L    Protein, total 8.3 (H) 6.4 - 8.2 g/dL    Albumin 4.4 3.5 - 5.0 g/dL    Globulin 3.9 2.0 - 4.0 g/dL    A-G Ratio 1.1 1.1 - 2.2     PROTHROMBIN TIME + INR   Result Value Ref Range    INR 1.0 0.9 - 1.1      Prothrombin time 9.9 9.0 - 11.1 sec   HEMOGLOBIN A1C WITH EAG   Result Value Ref Range    Hemoglobin A1c 6.9 (H) 4.2 - 6.3 %    Est. average glucose 151 mg/dL   DRUG SCREEN, URINE   Result Value Ref Range    AMPHETAMINES NEGATIVE  NEG      BARBITURATES NEGATIVE  NEG      BENZODIAZEPINES POSITIVE (A) NEG      COCAINE NEGATIVE  NEG      METHADONE NEGATIVE  NEG      OPIATES NEGATIVE  NEG      PCP(PHENCYCLIDINE) NEGATIVE  NEG      THC (TH-CANNABINOL) NEGATIVE  NEG      Drug screen comment (NOTE)    AMMONIA   Result Value Ref Range    Ammonia 22 <32 UMOL/L   LACTIC ACID   Result Value Ref Range    Lactic acid 1.5 0.4 - 2.0 MMOL/L   LIPID PANEL   Result Value Ref Range    LIPID PROFILE          Cholesterol, total 175 <200 MG/DL    Triglyceride 191 (H) <150 MG/DL    HDL Cholesterol 39 MG/DL    LDL, calculated 97.8 0 - 100 MG/DL    VLDL, calculated 38.2 MG/DL    CHOL/HDL Ratio 4.5 0.0 - 5.0     HEMOGLOBIN A1C WITH EAG   Result Value Ref Range    Hemoglobin A1c 7.2 (H) 4.2 - 6.3 %    Est. average glucose 566 mg/dL   SALICYLATE   Result Value Ref Range    Salicylate level <5.5 (L) 2.8 - 20.0 MG/DL   TSH 3RD GENERATION   Result Value Ref Range    TSH 0.83 0.36 - 3.74 uIU/mL   ETHYL ALCOHOL   Result Value Ref Range    ALCOHOL(ETHYL),SERUM <10 <10 MG/DL   ACETAMINOPHEN   Result Value Ref Range    Acetaminophen level <2 (L) 10 - 30 ug/mL   CK W/ CKMB & INDEX   Result Value Ref Range    CK 81 26 - 192 U/L    CK - MB 1.3 <3.6 NG/ML    CK-MB Index 1.6 0.0 - 2.5     TROPONIN I   Result Value Ref Range    Troponin-I, Qt. 0.07 (H) <0.05 ng/mL   NT-PRO BNP   Result Value Ref Range    NT pro-BNP 1,065 (H) <125 PG/ML   CBC WITH AUTOMATED DIFF   Result Value Ref Range    WBC 8.8 3.6 - 11.0 K/uL    RBC 3.53 (L) 3.80 - 5.20 M/uL    HGB 11.2 (L) 11.5 - 16.0 g/dL    HCT 34.4 (L) 35.0 - 47.0 %    MCV 97.5 80.0 - 99.0 FL    MCH 31.7 26.0 - 34.0 PG    MCHC 32.6 30.0 - 36.5 g/dL    RDW 12.6 11.5 - 14.5 %    PLATELET 644 997 - 829 K/uL    MPV 11.2 8.9 - 12.9 FL    NRBC 0.3 (H) 0  WBC    ABSOLUTE NRBC 0.03 (H) 0.00 - 0.01 K/uL    NEUTROPHILS 74 32 - 75 %    LYMPHOCYTES 17 12 - 49 %    MONOCYTES 7 5 - 13 %    EOSINOPHILS 2 0 - 7 %    BASOPHILS 0 0 - 1 %    IMMATURE GRANULOCYTES 0 0.0 - 0.5 %    ABS. NEUTROPHILS 6.5 1.8 - 8.0 K/UL    ABS. LYMPHOCYTES 1.5 0.8 - 3.5 K/UL    ABS. MONOCYTES 0.6 0.0 - 1.0 K/UL    ABS. EOSINOPHILS 0.2 0.0 - 0.4 K/UL    ABS. BASOPHILS 0.0 0.0 - 0.1 K/UL    ABS. IMM.  GRANS. 0.0 0.00 - 0.04 K/UL    DF AUTOMATED     MAGNESIUM   Result Value Ref Range    Magnesium 2.0 1.6 - 2.4 mg/dL   METABOLIC PANEL, COMPREHENSIVE   Result Value Ref Range    Sodium 146 (H) 136 - 145 mmol/L    Potassium 3.2 (L) 3.5 - 5.1 mmol/L    Chloride 111 (H) 97 - 108 mmol/L    CO2 27 21 - 32 mmol/L    Anion gap 8 5 - 15 mmol/L    Glucose 159 (H) 65 - 100 mg/dL    BUN 11 6 - 20 MG/DL    Creatinine 0.77 0.55 - 1.02 MG/DL    BUN/Creatinine ratio 14 12 - 20      GFR est AA >60 >60 ml/min/1.73m2    GFR est non-AA >60 >60 ml/min/1.73m2    Calcium 7.8 (L) 8.5 - 10.1 MG/DL    Bilirubin, total 0.3 0.2 - 1.0 MG/DL    ALT (SGPT) 15 12 - 78 U/L    AST (SGOT) 10 (L) 15 - 37 U/L    Alk. phosphatase 46 45 - 117 U/L    Protein, total 5.4 (L) 6.4 - 8.2 g/dL    Albumin 2.8 (L) 3.5 - 5.0 g/dL    Globulin 2.6 2.0 - 4.0 g/dL    A-G Ratio 1.1 1.1 - 2.2     PHOSPHORUS   Result Value Ref Range    Phosphorus 2.7 2.6 - 4.7 MG/DL   CK W/ CKMB & INDEX   Result Value Ref Range    CK 50 26 - 192 U/L    CK - MB <1.0 <3.6 NG/ML    CK-MB Index Cannot be calculated 0.0 - 2.5     TROPONIN I   Result Value Ref Range    Troponin-I, Qt. <0.05 <0.05 ng/mL   POC G3 - PUL   Result Value Ref Range    FIO2 (POC) 50 %    pH (POC) 7.351 7.35 - 7.45      pCO2 (POC) 51.9 (H) 35.0 - 45.0 MMHG    pO2 (POC) 75 (L) 80 - 100 MMHG    HCO3 (POC) 28.7 (H) 22 - 26 MMOL/L    sO2 (POC) 94 92 - 97 %    Base excess (POC) 3 mmol/L    Site LEFT RADIAL      Device: VENT      Mode ASSIST CONTROL      Tidal volume 450 ml    Set Rate 16 bpm    PEEP/CPAP (POC) 8 cmH2O    Allens test (POC) YES      Specimen type (POC) ARTERIAL      Total resp.  rate 16     CBC W/O DIFF   Result Value Ref Range    WBC 9.6 3.6 - 11.0 K/uL    RBC 3.18 (L) 3.80 - 5.20 M/uL    HGB 10.0 (L) 11.5 - 16.0 g/dL    HCT 31.9 (L) 35.0 - 47.0 %    .3 (H) 80.0 - 99.0 FL    MCH 31.4 26.0 - 34.0 PG    MCHC 31.3 30.0 - 36.5 g/dL    RDW 12.9 11.5 - 14.5 %    PLATELET 892 109 - 876 K/uL    MPV 11.5 8.9 - 12.9 FL    NRBC 0.0 0  WBC    ABSOLUTE NRBC 0.00 0.00 - 5.58 K/uL   METABOLIC PANEL, BASIC   Result Value Ref Range    Sodium 146 (H) 136 - 145 mmol/L    Potassium 4.0 3.5 - 5.1 mmol/L    Chloride 112 (H) 97 - 108 mmol/L    CO2 28 21 - 32 mmol/L    Anion gap 6 5 - 15 mmol/L    Glucose 135 (H) 65 - 100 mg/dL    BUN 11 6 - 20 MG/DL    Creatinine 0.85 0.55 - 1.02 MG/DL    BUN/Creatinine ratio 13 12 - 20      GFR est AA >60 >60 ml/min/1.73m2    GFR est non-AA >60 >60 ml/min/1.73m2    Calcium 8.2 (L) 8.5 - 10.1 MG/DL   CBC WITH AUTOMATED DIFF   Result Value Ref Range    WBC 9.3 3.6 - 11.0 K/uL    RBC 3.53 (L) 3.80 - 5.20 M/uL    HGB 10.9 (L) 11.5 - 16.0 g/dL    HCT 35.0 35.0 - 47.0 %    MCV 99.2 (H) 80.0 - 99.0 FL    MCH 30.9 26.0 - 34.0 PG    MCHC 31.1 30.0 - 36.5 g/dL    RDW 13.1 11.5 - 14.5 %    PLATELET 862 057 - 121 K/uL    MPV 11.6 8.9 - 12.9 FL    NRBC 0.0 0  WBC    ABSOLUTE NRBC 0.00 0.00 - 0.01 K/uL    NEUTROPHILS 74 32 - 75 %    LYMPHOCYTES 12 12 - 49 %    MONOCYTES 10 5 - 13 %    EOSINOPHILS 3 0 - 7 %    BASOPHILS 1 0 - 1 %    IMMATURE GRANULOCYTES 0 0.0 - 0.5 %    ABS. NEUTROPHILS 7.0 1.8 - 8.0 K/UL    ABS. LYMPHOCYTES 1.1 0.8 - 3.5 K/UL    ABS. MONOCYTES 0.9 0.0 - 1.0 K/UL    ABS. EOSINOPHILS 0.3 0.0 - 0.4 K/UL    ABS. BASOPHILS 0.1 0.0 - 0.1 K/UL    ABS. IMM. GRANS. 0.0 0.00 - 0.04 K/UL    DF AUTOMATED     METABOLIC PANEL, COMPREHENSIVE   Result Value Ref Range    Sodium 148 (H) 136 - 145 mmol/L    Potassium 3.7 3.5 - 5.1 mmol/L    Chloride 112 (H) 97 - 108 mmol/L    CO2 29 21 - 32 mmol/L    Anion gap 7 5 - 15 mmol/L    Glucose 120 (H) 65 - 100 mg/dL    BUN 10 6 - 20 MG/DL    Creatinine 0.81 0.55 - 1.02 MG/DL    BUN/Creatinine ratio 12 12 - 20      GFR est AA >60 >60 ml/min/1.73m2    GFR est non-AA >60 >60 ml/min/1.73m2    Calcium 8.3 (L) 8.5 - 10.1 MG/DL    Bilirubin, total 0.5 0.2 - 1.0 MG/DL    ALT (SGPT) 22 12 - 78 U/L    AST (SGOT) 18 15 - 37 U/L    Alk.  phosphatase 50 45 - 117 U/L    Protein, total 6.0 (L) 6.4 - 8.2 g/dL    Albumin 2.9 (L) 3.5 - 5.0 g/dL    Globulin 3.1 2.0 - 4.0 g/dL    A-G Ratio 0.9 (L) 1.1 - 2.2     METABOLIC PANEL, COMPREHENSIVE   Result Value Ref Range    Sodium 146 (H) 136 - 145 mmol/L    Potassium 3.7 3.5 - 5.1 mmol/L    Chloride 108 97 - 108 mmol/L    CO2 32 21 - 32 mmol/L    Anion gap 6 5 - 15 mmol/L    Glucose 139 (H) 65 - 100 mg/dL    BUN 9 6 - 20 MG/DL    Creatinine 0.86 0.55 - 1.02 MG/DL    BUN/Creatinine ratio 10 (L) 12 - 20      GFR est AA >60 >60 ml/min/1.73m2    GFR est non-AA >60 >60 ml/min/1.73m2    Calcium 8.6 8.5 - 10.1 MG/DL    Bilirubin, total 0.8 0.2 - 1.0 MG/DL    ALT (SGPT) 31 12 - 78 U/L    AST (SGOT) 30 15 - 37 U/L    Alk. phosphatase 58 45 - 117 U/L    Protein, total 7.0 6.4 - 8.2 g/dL    Albumin 3.4 (L) 3.5 - 5.0 g/dL    Globulin 3.6 2.0 - 4.0 g/dL    A-G Ratio 0.9 (L) 1.1 - 2.2     METABOLIC PANEL, BASIC   Result Value Ref Range    Sodium 146 (H) 136 - 145 mmol/L    Potassium 3.9 3.5 - 5.1 mmol/L    Chloride 109 (H) 97 - 108 mmol/L    CO2 30 21 - 32 mmol/L    Anion gap 7 5 - 15 mmol/L    Glucose 131 (H) 65 - 100 mg/dL    BUN 8 6 - 20 MG/DL    Creatinine 0.75 0.55 - 1.02 MG/DL    BUN/Creatinine ratio 11 (L) 12 - 20      GFR est AA >60 >60 ml/min/1.73m2    GFR est non-AA >60 >60 ml/min/1.73m2    Calcium 8.8 8.5 - 10.1 MG/DL   CBC WITH AUTOMATED DIFF   Result Value Ref Range    WBC 8.4 3.6 - 11.0 K/uL    RBC 3.86 3.80 - 5.20 M/uL    HGB 12.0 11.5 - 16.0 g/dL    HCT 38.1 35.0 - 47.0 %    MCV 98.7 80.0 - 99.0 FL    MCH 31.1 26.0 - 34.0 PG    MCHC 31.5 30.0 - 36.5 g/dL    RDW 12.6 11.5 - 14.5 %    PLATELET 147 918 - 330 K/uL    MPV 11.1 8.9 - 12.9 FL    NRBC 0.0 0  WBC    ABSOLUTE NRBC 0.00 0.00 - 0.01 K/uL    NEUTROPHILS 70 32 - 75 %    LYMPHOCYTES 17 12 - 49 %    MONOCYTES 10 5 - 13 %    EOSINOPHILS 3 0 - 7 %    BASOPHILS 0 0 - 1 %    IMMATURE GRANULOCYTES 0 0.0 - 0.5 %    ABS. NEUTROPHILS 5.9 1.8 - 8.0 K/UL    ABS. LYMPHOCYTES 1.5 0.8 - 3.5 K/UL    ABS. MONOCYTES 0.8 0.0 - 1.0 K/UL    ABS. EOSINOPHILS 0.3 0.0 - 0.4 K/UL    ABS. BASOPHILS 0.0 0.0 - 0.1 K/UL    ABS. IMM.  GRANS. 0.0 0.00 - 0.04 K/UL    DF AUTOMATED     POC G3 - PUL   Result Value Ref Range    FIO2 (POC) 35 %    pH (POC) 7.342 (L) 7.35 - 7.45      pCO2 (POC) 54.7 (H) 35.0 - 45.0 MMHG    pO2 (POC) 80 80 - 100 MMHG    HCO3 (POC) 29.6 (H) 22 - 26 MMOL/L    sO2 (POC) 95 92 - 97 %    Base excess (POC) 4 mmol/L    Site RIGHT RADIAL      Device: BIPAP      PEEP/CPAP (POC) 7 cmH2O    Pressure support 3 cmH2O Allens test (POC) YES      Specimen type (POC) ARTERIAL      Total resp. rate 20     CBC W/O DIFF   Result Value Ref Range    WBC 10.6 3.6 - 11.0 K/uL    RBC 4.41 3.80 - 5.20 M/uL    HGB 13.7 11.5 - 16.0 g/dL    HCT 43.0 35.0 - 47.0 %    MCV 97.5 80.0 - 99.0 FL    MCH 31.1 26.0 - 34.0 PG    MCHC 31.9 30.0 - 36.5 g/dL    RDW 12.5 11.5 - 14.5 %    PLATELET 932 729 - 946 K/uL    MPV 11.2 8.9 - 12.9 FL    NRBC 0.0 0  WBC    ABSOLUTE NRBC 0.00 0.00 - 5.41 K/uL   METABOLIC PANEL, COMPREHENSIVE   Result Value Ref Range    Sodium 143 136 - 145 mmol/L    Potassium 3.2 (L) 3.5 - 5.1 mmol/L    Chloride 104 97 - 108 mmol/L    CO2 29 21 - 32 mmol/L    Anion gap 10 5 - 15 mmol/L    Glucose 143 (H) 65 - 100 mg/dL    BUN 18 6 - 20 MG/DL    Creatinine 1.03 (H) 0.55 - 1.02 MG/DL    BUN/Creatinine ratio 17 12 - 20      GFR est AA >60 >60 ml/min/1.73m2    GFR est non-AA 55 (L) >60 ml/min/1.73m2    Calcium 9.5 8.5 - 10.1 MG/DL    Bilirubin, total 0.8 0.2 - 1.0 MG/DL    ALT (SGPT) 39 12 - 78 U/L    AST (SGOT) 30 15 - 37 U/L    Alk.  phosphatase 60 45 - 117 U/L    Protein, total 7.3 6.4 - 8.2 g/dL    Albumin 3.4 (L) 3.5 - 5.0 g/dL    Globulin 3.9 2.0 - 4.0 g/dL    A-G Ratio 0.9 (L) 1.1 - 2.2     MAGNESIUM   Result Value Ref Range    Magnesium 2.1 1.6 - 2.4 mg/dL   METABOLIC PANEL, BASIC   Result Value Ref Range    Sodium 142 136 - 145 mmol/L    Potassium 3.7 3.5 - 5.1 mmol/L    Chloride 104 97 - 108 mmol/L    CO2 27 21 - 32 mmol/L    Anion gap 11 5 - 15 mmol/L    Glucose 137 (H) 65 - 100 mg/dL    BUN 22 (H) 6 - 20 MG/DL    Creatinine 0.98 0.55 - 1.02 MG/DL    BUN/Creatinine ratio 22 (H) 12 - 20      GFR est AA >60 >60 ml/min/1.73m2    GFR est non-AA 59 (L) >60 ml/min/1.73m2    Calcium 9.2 8.5 - 10.1 MG/DL   CBC W/O DIFF   Result Value Ref Range    WBC 8.6 3.6 - 11.0 K/uL    RBC 4.27 3.80 - 5.20 M/uL    HGB 13.3 11.5 - 16.0 g/dL    HCT 42.0 35.0 - 47.0 %    MCV 98.4 80.0 - 99.0 FL    MCH 31.1 26.0 - 34.0 PG    MCHC 31.7 30.0 - 36.5 g/dL    RDW 12.8 11.5 - 14.5 %    PLATELET 298 800 - 913 K/uL    MPV 11.4 8.9 - 12.9 FL    NRBC 0.0 0  WBC    ABSOLUTE NRBC 0.00 0.00 - 1.47 K/uL   METABOLIC PANEL, COMPREHENSIVE   Result Value Ref Range    Sodium 143 136 - 145 mmol/L    Potassium 3.8 3.5 - 5.1 mmol/L    Chloride 105 97 - 108 mmol/L    CO2 28 21 - 32 mmol/L    Anion gap 10 5 - 15 mmol/L    Glucose 131 (H) 65 - 100 mg/dL    BUN 27 (H) 6 - 20 MG/DL    Creatinine 1.09 (H) 0.55 - 1.02 MG/DL    BUN/Creatinine ratio 25 (H) 12 - 20      GFR est AA >60 >60 ml/min/1.73m2    GFR est non-AA 52 (L) >60 ml/min/1.73m2    Calcium 9.3 8.5 - 10.1 MG/DL    Bilirubin, total 0.8 0.2 - 1.0 MG/DL    ALT (SGPT) 45 12 - 78 U/L    AST (SGOT) 29 15 - 37 U/L    Alk. phosphatase 59 45 - 117 U/L    Protein, total 7.2 6.4 - 8.2 g/dL    Albumin 3.4 (L) 3.5 - 5.0 g/dL    Globulin 3.8 2.0 - 4.0 g/dL    A-G Ratio 0.9 (L) 1.1 - 2.2     CBC W/O DIFF   Result Value Ref Range    WBC 9.1 3.6 - 11.0 K/uL    RBC 4.39 3.80 - 5.20 M/uL    HGB 13.3 11.5 - 16.0 g/dL    HCT 42.9 35.0 - 47.0 %    MCV 97.7 80.0 - 99.0 FL    MCH 30.3 26.0 - 34.0 PG    MCHC 31.0 30.0 - 36.5 g/dL    RDW 12.6 11.5 - 14.5 %    PLATELET 913 493 - 783 K/uL    MPV 11.2 8.9 - 12.9 FL    NRBC 0.0 0  WBC    ABSOLUTE NRBC 0.00 0.00 - 0.35 K/uL   METABOLIC PANEL, COMPREHENSIVE   Result Value Ref Range    Sodium 141 136 - 145 mmol/L    Potassium 4.2 3.5 - 5.1 mmol/L    Chloride 106 97 - 108 mmol/L    CO2 29 21 - 32 mmol/L    Anion gap 6 5 - 15 mmol/L    Glucose 151 (H) 65 - 100 mg/dL    BUN 27 (H) 6 - 20 MG/DL    Creatinine 1.04 (H) 0.55 - 1.02 MG/DL    BUN/Creatinine ratio 26 (H) 12 - 20      GFR est AA >60 >60 ml/min/1.73m2    GFR est non-AA 55 (L) >60 ml/min/1.73m2    Calcium 9.5 8.5 - 10.1 MG/DL    Bilirubin, total 0.5 0.2 - 1.0 MG/DL    ALT (SGPT) 48 12 - 78 U/L    AST (SGOT) 26 15 - 37 U/L    Alk.  phosphatase 69 45 - 117 U/L    Protein, total 7.8 6.4 - 8.2 g/dL    Albumin 3.7 3.5 - 5.0 g/dL    Globulin 4.1 (H) 2.0 - 4.0 g/dL    A-G Ratio 0.9 (L) 1.1 - 2.2     POC VENOUS BLOOD GAS   Result Value Ref Range    Device: NASAL CANNULA      Flow rate (POC) 3.5 L/M    pH, venous (POC) 7.157 (LL) 7.32 - 7.42      pCO2, venous (POC) >90.0 (H) 41 - 51 MMHG    pO2, venous (POC) 56 (H) 25 - 40 mmHg    Allens test (POC) N/A      Site OTHER      Specimen type (POC) VENOUS BLOOD     GLUCOSE, POC   Result Value Ref Range    Glucose (POC) 125 (H) 65 - 100 mg/dL    Performed by [facility]    GLUCOSE, POC   Result Value Ref Range    Glucose (POC) 162 (H) 65 - 100 mg/dL    Performed by [facility]    GLUCOSE, POC   Result Value Ref Range    Glucose (POC) 188 (H) 65 - 100 mg/dL    Performed by [employer]    GLUCOSE, POC   Result Value Ref Range    Glucose (POC) 179 (H) 65 - 100 mg/dL    Performed by Leslie Card    GLUCOSE, POC   Result Value Ref Range    Glucose (POC) 131 (H) 65 - 100 mg/dL    Performed by [employer]    GLUCOSE, POC   Result Value Ref Range    Glucose (POC) 138 (H) 65 - 100 mg/dL    Performed by [employer]    GLUCOSE, POC   Result Value Ref Range    Glucose (POC) 156 (H) 65 - 100 mg/dL    Performed by Leslie Card    GLUCOSE, POC   Result Value Ref Range    Glucose (POC) 128 (H) 65 - 100 mg/dL    Performed by Leslie Card    GLUCOSE, POC   Result Value Ref Range    Glucose (POC) 137 (H) 65 - 100 mg/dL    Performed by [email or web address]    GLUCOSE, POC   Result Value Ref Range    Glucose (POC) 115 (H) 65 - 100 mg/dL    Performed by [employer]    GLUCOSE, POC   Result Value Ref Range    Glucose (POC) 132 (H) 65 - 100 mg/dL    [facility]   Result Value Ref Range    Glucose (POC) 157 (H) 65 - 100 mg/dL    Performed by [employer]    GLUCOSE, POC   Result Value Ref Range    Glucose (POC) 115 (H) 65 - 100 mg/dL    Performed by [employer]    GLUCOSE, POC   Result Value Ref Range    Glucose (POC) 124 (H) 65 - 100 mg/dL    [facility]   Result Value Ref Range    Glucose (POC) 133 (H) 65 - 100 mg/dL    Performed by [address] [place] [address], POC   Result Value Ref Range    Glucose (POC) 136 (H) 65 - 100 mg/dL    Performed by Cherelle Marion    GLUCOSE, POC   Result Value Ref Range    Glucose (POC) 172 (H) 65 - 100 mg/dL    Performed by Johnny Arbour    GLUCOSE, POC   Result Value Ref Range    Glucose (POC) 150 (H) 65 - 100 mg/dL    Performed by Johnny Arbour    GLUCOSE, POC   Result Value Ref Range    Glucose (POC) 135 (H) 65 - 100 mg/dL    Performed by Johnny Arbour    GLUCOSE, POC   Result Value Ref Range    Glucose (POC) 145 (H) 65 - 100 mg/dL    Performed by Jc Lias    GLUCOSE, POC   Result Value Ref Range    Glucose (POC) 156 (H) 65 - 100 mg/dL    Performed by Shaun Miranda    GLUCOSE, POC   Result Value Ref Range    Glucose (POC) 134 (H) 65 - 100 mg/dL    Performed by Lorie Banda.     GLUCOSE, POC   Result Value Ref Range    Glucose (POC) 145 (H) 65 - 100 mg/dL    Performed by Merlyn Limon    GLUCOSE, POC   Result Value Ref Range    Glucose (POC) 138 (H) 65 - 100 mg/dL    Performed by Jc Lias    GLUCOSE, POC   Result Value Ref Range    Glucose (POC) 149 (H) 65 - 100 mg/dL    Performed by Jo Pro    GLUCOSE, POC   Result Value Ref Range    Glucose (POC) 124 (H) 65 - 100 mg/dL    Performed by Douangphoumy Spaphay P    GLUCOSE, POC   Result Value Ref Range    Glucose (POC) 116 (H) 65 - 100 mg/dL    Performed by [employer] P    GLUCOSE, POC   Result Value Ref Range    Glucose (POC) 140 (H) 65 - 100 mg/dL    Performed by Trent LOCKE    GLUCOSE, POC   Result Value Ref Range    Glucose (POC) 130 (H) 65 - 100 mg/dL    Performed by Merlyn Limon    EKG, 12 LEAD, INITIAL   Result Value Ref Range    Ventricular Rate 92 BPM    Atrial Rate 92 BPM    P-R Interval 202 ms    QRS Duration 104 ms    Q-T Interval 390 ms    QTC Calculation (Bezet) 482 ms    Calculated P Axis 57 degrees    Calculated R Axis 66 degrees    Calculated T Axis 33 degrees    Diagnosis       Normal sinus rhythm with sinus arrhythmia  Prolonged QT  No previous ECGs available  Confirmed by Julian Watts MD, Simón Powell ([id number]) on 9/6/2019 10:48:15 AM     EKG, 12 LEAD, INITIAL   Result Value Ref Range    Ventricular Rate 87 BPM    Atrial Rate 87 BPM    P-R Interval 162 ms    QRS Duration 82 ms    Q-T Interval 394 ms    QTC Calculation (Bezet) 474 ms    Calculated P Axis 49 degrees    Calculated R Axis 93 degrees    Calculated T Axis 26 degrees    Diagnosis       Normal sinus rhythm  Rightward axis  QT prolongation  When compared with ECG of 05-SEP-2019 22:29,  QRS duration has decreased  Confirmed by Tee Sherman MD, Blu Pop ([id number]) on 9/12/2019 8:56:49 AM     ECHO ADULT COMPLETE   Result Value Ref Range    LV E' Lateral Velocity 7.12 cm/s    LV E' Septal Velocity 7.39 cm/s    Tapse 2.50 (A) 1.5 - 2.0 cm    Ao Root D 3.34 cm    Aortic Valve Systolic Peak Velocity 562.16 cm/s    Aortic Valve Area by Continuity of Peak Velocity 2.3 cm2    AoV PG 9.2 mmHg    LVIDd 4.56 3.9 - 5.3 cm    LVPWd 1.07 (A) 0.6 - 0.9 cm    LVIDs 2.93 cm    IVSd 1.16 (A) 0.6 - 0.9 cm    LVOT d 1.79 cm    LVOT Peak Velocity 140.20 cm/s    LVOT Peak Gradient 7.9 mmHg    MVA (PHT) 2.5 cm2    MV A Nicolas 73.07 cm/s    MV E Nicolas 84.42 cm/s    MV E/A 1.16     Left Atrium to Aortic Root Ratio 1.24     RVIDd 3.41 cm    LV Mass .5 (A) 67 - 162 g    LV Mass AL Index 88.7 43 - 95 g/m2    E/E' lateral 11.86     E/E' septal 11.42     E/E' ratio (averaged) 11.64     Mitral Valve E Wave Deceleration Time 297.8 ms    Mitral Valve Pressure Half-time 86.4 ms    Left Atrium Major Axis 4.15 cm    Triscuspid Valve Regurgitation Peak Gradient 13.5 mmHg    Aortic Regurgitant Pressure Half-time 659.6 cm    Pulmonic Valve Max Velocity 120.67 cm/s    TR Max Velocity 183.58 cm/s    AV Cusp 2.24 cm    AR Max Nicolas 314.76 cm/s    PV peak gradient 5.8 mmHg        Exam:  Visit Vitals  /80 (BP 1 Location: Left arm, BP Patient Position: Sitting)   Pulse 76   Resp 18   Ht 5' 3\" (1.6 m)   Wt 138.3 kg (305 lb)   LMP  (LMP Unknown)   SpO2 96%   BMI 54.03 kg/m²     General:  Alert, cooperative, no distress. Head:  Normocephalic, without obvious abnormality, atraumatic. Respiratory:  Heart:   Non labored breathing  Regular rate and rhythm, no murmurs   Neck:   2+ carotids, no bruits   Extremities: Warm, no cyanosis or edema. Pulses: 2+ radial pulses. Neurologic:  MS: Alert and Oriented x 4, speech intact. Language intact, able to name, repeat, and follow all commands. Attention and fund of knowledge appropriate. Recent and remote memory intact. Cranial Nerves:  II: visual fields Full to confrontation   II: pupils Equal, round, reactive to light   II: optic disc    III,VII: ptosis none   III,IV,VI: extraocular muscles  EOMI, no nystagmus or diplopia   V: facial light touch sensation     VII: facial muscle function   symmetric   VIII: hearing intact   IX: soft palate elevation  normal   XI: trapezius strength     XI: sternocleidomastoid strength    XII: tongue  Midline     Motor: normal bulk and tone, no tremor              Strength: 5/5 throughout, no PD  Sensory:   Coordination: FTN and HTS intact, CASEY intact  Gait: normal gait, able to tandem walk  Reflexes: 2+ symmetric           Assessment/Plan   Pt is a 64 y.o. Female hospitalized 9/5/19 - 9/13/19 after presenting with witnessed seizure x 2, given versed by EMS, , , O2 sat 78%. She reportedly had right gaze pref and left sided weakness in ED. Normal CTH, CTA H/N. Received IV TPA. Given Keppra 2000mg IV.  MRI brain neg for stroke or epileptogenic focus.  Initial EEG with changes c/w propofol. Pt denied h/o seizure, h/o concussion as a child, no family h/o epilepsy, no h/o CNS infection.  Repeat EEG 9/10/19 with right > left temporal slowing and right FT sharp waves, concerning for underlying epileptogenic focus given presentation. Exam with BMI 54, o/w non-focal and unremarkable. Seizures well controlled on Keppra 500mg bid, discussed DMV regulations -no driving x 6 months, discussed work accommodations while she is unable to drive. F/u in clinic in 4 months, instructed to call in the interim if needed. ICD-10-CM ICD-9-CM    1. [facility] G40.109 345.50        Signed:   Vernon Real MD  9/26/2019

## 2019-09-27 ENCOUNTER — TELEPHONE (OUTPATIENT)
Dept: NEUROLOGY | Age: 59
End: 2019-09-27

## 2019-09-27 NOTE — TELEPHONE ENCOUNTER
Patient calling in need a letter stating that she may return to work on Monday, 09/30/19, with no restrictions. Please advise.       Best # [phone]

## 2019-09-27 NOTE — TELEPHONE ENCOUNTER
Melanieorrsara Jolley - Please prepare letter. State in letter that patient cannot drive, but is able to return to work without restrictions.

## 2019-09-27 NOTE — TELEPHONE ENCOUNTER
Informed patient that Vester Dakins has typed up her letter under the condition of no driving. She verbalized understanding. She will have someone come by the office and  the letter.

## 2019-10-06 DIAGNOSIS — F41.9 ANXIETY: ICD-10-CM

## 2019-10-07 RX ORDER — BUSPIRONE HYDROCHLORIDE 10 MG/1
TABLET ORAL
Qty: 30 TAB | Refills: 0 | Status: SHIPPED | OUTPATIENT
Start: 2019-10-07 | End: 2019-10-17 | Stop reason: SDUPTHER

## 2019-10-07 NOTE — TELEPHONE ENCOUNTER
PCP: Emeka Sanders MD    Last appt: 9/18/2019  Future Appointments   Date Time Provider Jose Valentino   10/24/2019  3:20 PM Jayne Rosales  [address]   1/31/2020  2:40 PM MD Mera Lawrence       Requested Prescriptions     Pending Prescriptions Disp Refills    busPIRone (BUSPAR) 10 mg tablet [Pharmacy Med Name: BUSPIRONE HCL 10MG  TAB] 30 Tab 0     Sig: TAKE 1/2 TABLETS  (ONE-HALF) TO 2 TABLETS BY MOUTH THREE TIMES DAILY AS NEEDED FOR ANXIETY       Prior labs and Blood pressures:  BP Readings from Last 3 Encounters:   09/26/19 140/80   09/18/19 (!) 148/100   09/13/19 162/75     Lab Results   Component Value Date/Time    Sodium 141 09/13/2019 02:06 AM    Potassium 4.2 09/13/2019 02:06 AM    Chloride 106 09/13/2019 02:06 AM    CO2 29 09/13/2019 02:06 AM    Anion gap 6 09/13/2019 02:06 AM    Glucose 151 (H) 09/13/2019 02:06 AM    BUN 27 (H) 09/13/2019 02:06 AM    Creatinine 1.04 (H) 09/13/2019 02:06 AM    BUN/Creatinine ratio 26 (H) 09/13/2019 02:06 AM    GFR est AA >60 09/13/2019 02:06 AM    GFR est non-AA 55 (L) 09/13/2019 02:06 AM    Calcium 9.5 09/13/2019 02:06 AM     Lab Results   Component Value Date/Time    Hemoglobin A1c 7.2 (H) 09/06/2019 08:56 AM     Lab Results   Component Value Date/Time    Cholesterol, total 175 09/06/2019 08:56 AM    HDL Cholesterol 39 09/06/2019 08:56 AM    LDL, calculated 97.8 09/06/2019 08:56 AM    VLDL, calculated 38.2 09/06/2019 08:56 AM    Triglyceride 191 (H) 09/06/2019 08:56 AM    CHOL/HDL Ratio 4.5 09/06/2019 08:56 AM     No results found for: SANDRO Flores    Lab Results   Component Value Date/Time    TSH 0.83 09/06/2019 08:56 AM

## 2019-10-15 DIAGNOSIS — F41.9 ANXIETY: ICD-10-CM

## 2019-10-15 NOTE — TELEPHONE ENCOUNTER
----- Message from Francisca Regalado sent at 10/15/2019  3:54 PM EDT -----  Regarding: Dr. Aj Bro Telephone/ Refill   General Message/Vendor Calls    Caller's first and last name: Patient     Reason for call: Pt is needing a refill on Busprione. She is also needing refills on her medications previously prescribed by her other doctors. Metformin 500mg, Metoprolol 25mg, Torsemide 20mg, Levetiracetam 500mg (seizures) and Carvedilol 6.25mg to be sent into [facility] (on file). Pt is requesting a few months worth of her medications because her insurance is running out in December.        Callback required yes/no and why: yes       Best contact number(s): [phone]      Details to clarify the request:      Francisca Regalado

## 2019-10-15 NOTE — TELEPHONE ENCOUNTER
----- Message from Keyla Hernández sent at 10/15/2019  3:54 PM EDT -----  Regarding: Dr. Mendy Soto Telephone/ Refill   General Message/Vendor Calls    Caller's first and last name: Patient     Reason for call: Pt is needing a refill on Busprione. She is also needing refills on her medications previously prescribed by her other doctors. Metformin 500mg, Metoprolol 25mg, Torsemide 20mg, Levetiracetam 500mg (seizures) and Carvedilol 6.25mg to be sent into [facility] [address] (on file). Pt is requesting a few months worth of her medications because her insurance is running out in December.        Callback required yes/no and why: yes       Best contact number(s): [phone]      Details to clarify the request:      Keyla Hernández

## 2019-10-15 NOTE — TELEPHONE ENCOUNTER
Requested Prescriptions     Pending Prescriptions Disp Refills    busPIRone (BUSPAR) 10 mg tablet 30 Tab 0     I put in a request for the Buspar because Dr. Chey Emanuel did fill it, but what to do with the other medications she been taking ?

## 2019-10-16 NOTE — TELEPHONE ENCOUNTER
PCP: Thi Mullen MD    Last appt: 9/18/2019  Future Appointments   Date Time Provider Jose Valentino   10/24/2019  3:20 PM aMry Case  [address]   1/31/2020  2:40 PM Kavon Hannon MD [address]       Requested Prescriptions     Pending Prescriptions Disp Refills    busPIRone (BUSPAR) 10 mg tablet 30 Tab 0       Prior labs and Blood pressures:  BP Readings from Last 3 Encounters:   09/26/19 140/80   09/18/19 (!) 148/100   09/13/19 162/75     Lab Results   Component Value Date/Time    Sodium 141 09/13/2019 02:06 AM    Potassium 4.2 09/13/2019 02:06 AM    Chloride 106 09/13/2019 02:06 AM    CO2 29 09/13/2019 02:06 AM    Anion gap 6 09/13/2019 02:06 AM    Glucose 151 (H) 09/13/2019 02:06 AM    BUN 27 (H) 09/13/2019 02:06 AM    Creatinine 1.04 (H) 09/13/2019 02:06 AM    BUN/Creatinine ratio 26 (H) 09/13/2019 02:06 AM    GFR est AA >60 09/13/2019 02:06 AM    GFR est non-AA 55 (L) 09/13/2019 02:06 AM    Calcium 9.5 09/13/2019 02:06 AM     Lab Results   Component Value Date/Time    Hemoglobin A1c 7.2 (H) 09/06/2019 08:56 AM     Lab Results   Component Value Date/Time    Cholesterol, total 175 09/06/2019 08:56 AM    HDL Cholesterol 39 09/06/2019 08:56 AM    LDL, calculated 97.8 09/06/2019 08:56 AM    VLDL, calculated 38.2 09/06/2019 08:56 AM    Triglyceride 191 (H) 09/06/2019 08:56 AM    CHOL/HDL Ratio 4.5 09/06/2019 08:56 AM     No results found for: SANDRO Fraire    Lab Results   Component Value Date/Time    TSH 0.83 09/06/2019 08:56 AM

## 2019-10-17 DIAGNOSIS — F41.9 ANXIETY: ICD-10-CM

## 2019-10-17 RX ORDER — BUSPIRONE HYDROCHLORIDE 10 MG/1
TABLET ORAL
Qty: 30 TAB | Refills: 0 | Status: SHIPPED | OUTPATIENT
Start: 2019-10-17 | End: 2019-12-04 | Stop reason: SDUPTHER

## 2019-10-17 RX ORDER — BUSPIRONE HYDROCHLORIDE 10 MG/1
TABLET ORAL
Qty: 30 TAB | Refills: 0 | OUTPATIENT
Start: 2019-10-17

## 2019-10-17 NOTE — TELEPHONE ENCOUNTER
PCP: Brandon Zaidi MD    Last appt: 9/18/2019  Future Appointments   Date Time Provider Jose Valentino   10/24/2019  3:20 PM Gena London  [address]   1/31/2020  2:40 PM Jeanette Regalado  [address]       Requested Prescriptions     Pending Prescriptions Disp Refills    busPIRone (BUSPAR) 10 mg tablet [Pharmacy Med Name: BUSPIRONE HCL 10MG  TAB] 30 Tab 0     Sig: TAKE 1/2 TABLETS  (ONE-HALF) TO 2 TABLETS BY MOUTH THREE TIMES DAILY AS NEEDED FOR ANXIETY       Prior labs and Blood pressures:  BP Readings from Last 3 Encounters:   09/26/19 140/80   09/18/19 (!) 148/100   09/13/19 162/75     Lab Results   Component Value Date/Time    Sodium 141 09/13/2019 02:06 AM    Potassium 4.2 09/13/2019 02:06 AM    Chloride 106 09/13/2019 02:06 AM    CO2 29 09/13/2019 02:06 AM    Anion gap 6 09/13/2019 02:06 AM    Glucose 151 (H) 09/13/2019 02:06 AM    BUN 27 (H) 09/13/2019 02:06 AM    Creatinine 1.04 (H) 09/13/2019 02:06 AM    BUN/Creatinine ratio 26 (H) 09/13/2019 02:06 AM    GFR est AA >60 09/13/2019 02:06 AM    GFR est non-AA 55 (L) 09/13/2019 02:06 AM    Calcium 9.5 09/13/2019 02:06 AM     Lab Results   Component Value Date/Time    Hemoglobin A1c 7.2 (H) 09/06/2019 08:56 AM     Lab Results   Component Value Date/Time    Cholesterol, total 175 09/06/2019 08:56 AM    HDL Cholesterol 39 09/06/2019 08:56 AM    LDL, calculated 97.8 09/06/2019 08:56 AM    VLDL, calculated 38.2 09/06/2019 08:56 AM    Triglyceride 191 (H) 09/06/2019 08:56 AM    CHOL/HDL Ratio 4.5 09/06/2019 08:56 AM     No results found for: SANDRO Sosa    Lab Results   Component Value Date/Time    TSH 0.83 09/06/2019 08:56 AM

## 2019-10-30 NOTE — TELEPHONE ENCOUNTER
----- Message from Rogelio Nesbitt sent at 10/30/2019  3:45 PM EDT -----  Regarding: Dr. Lin Saha Telephone  Pt would like a call back regarding refill on her Carvedilol called to [facility] [phone]. Also wanted to know if she can get on a plane.  Contact is [phone]

## 2019-10-31 RX ORDER — CARVEDILOL 6.25 MG/1
6.25 TABLET ORAL 2 TIMES DAILY
Qty: 60 TAB | Status: CANCELLED | OUTPATIENT
Start: 2019-10-31

## 2019-10-31 NOTE — TELEPHONE ENCOUNTER
LOV  19    Pendin20      Pt is asking that you fill her coreg. She was only given 30d supply by THE [facility] doctor in the hospital\". She wants to go on a trip and is asking if she ok to be on a plane for about 2 hours.

## 2019-10-31 NOTE — TELEPHONE ENCOUNTER
Butch Farias -   1) She will need to get Coreg refilled by her PCP or cardiologist if she has one. 2) There is no reason from a seizure standpoint that she cannot fly. Remind her to take her CPAP machine if she is staying overnight.

## 2019-11-04 NOTE — TELEPHONE ENCOUNTER
Called pt and got her VM. LM with the Drs recommendations. Advised call back if she has further questions.

## 2019-11-05 NOTE — TELEPHONE ENCOUNTER
Pt calling to find out if she needs to continue to take her Keppra and if so if Dr. Naz Marques can be the prescribing doctor.  Please call back

## 2019-11-05 NOTE — TELEPHONE ENCOUNTER
Requested Prescriptions     Pending Prescriptions Disp Refills    metFORMIN (GLUCOPHAGE) 500 mg tablet 60 Tab 0     Sig: Take 1 Tab by mouth two (2) times daily (with meals).

## 2019-11-05 NOTE — TELEPHONE ENCOUNTER
----- Message from Michel Arana sent at 11/5/2019 11:32 AM EST -----  Regarding: Dr. Alison Marquis: [phone]  Medication Refill    Caller (if not patient):      Relationship of caller (if not patient):      Best contact number(s):[phone]      Name of medication and dosage if known: \"Metformin 500mg\"      Is patient out of this medication (yes/no):Yes       Pharmacy name:[facility]     Pharmacy listed in chart? (yes/no):on file   Pharmacy phone number:      Details to clarify the request: Please contact patient to confirm information is received.       Michel Arana

## 2019-11-06 RX ORDER — LEVETIRACETAM 500 MG/1
500 TABLET ORAL 2 TIMES DAILY
Qty: 60 TAB | Refills: 5 | Status: CANCELLED | OUTPATIENT
Start: 2019-11-06

## 2019-11-06 RX ORDER — METFORMIN HYDROCHLORIDE 500 MG/1
500 TABLET ORAL 2 TIMES DAILY WITH MEALS
Qty: 180 TAB | Refills: 1 | Status: SHIPPED | OUTPATIENT
Start: 2019-11-06 | End: 2020-12-31

## 2019-11-06 RX ORDER — LEVETIRACETAM 500 MG/1
500 TABLET ORAL 2 TIMES DAILY
Qty: 60 TAB | Refills: 5 | Status: SHIPPED | OUTPATIENT
Start: 2019-11-06 | End: 2020-08-13

## 2019-11-06 NOTE — TELEPHONE ENCOUNTER
Patient is currently taking Keppra 500mg BID. She has not had any more seizures. Continue medication, correct? Patient would also like to know if Dr. Rosi Fitzgerald is willing to take on prescribing this medication.    Thanks

## 2019-11-06 NOTE — TELEPHONE ENCOUNTER
Patient stated she has been out of [address]. She is requesting a prescription to be sent in to [facility]. Patient has an upcoming appointment here 1/31/20.   Thanks

## 2019-11-06 NOTE — TELEPHONE ENCOUNTER
PCP: Eliseo Gottron, MD    Last appt: 9/18/2019  Future Appointments   Date Time Provider Jose Valentino   11/21/2019  1:00 PM Ross Olivia  [address] 1/31/2020  2:40 PM Briseyda Fontanez MD [address]       Requested Prescriptions     Pending Prescriptions Disp Refills    metFORMIN (GLUCOPHAGE) 500 mg tablet 60 Tab 2     Sig: Take 1 Tab by mouth two (2) times daily (with meals).

## 2019-11-06 NOTE — TELEPHONE ENCOUNTER
PCP: Esther Saez MD    Last appt: 9/18/2019  Future Appointments   Date Time Provider Jose Valentino   11/21/2019  1:00 PM Ilda Clay  [address] 1/31/2020  2:40 PM Manolo Tillman MD [address]       Requested Prescriptions     Pending Prescriptions Disp Refills    metFORMIN (GLUCOPHAGE) 500 mg tablet 60 Tab 0     Sig: Take 1 Tab by mouth two (2) times daily (with meals).

## 2019-11-06 NOTE — TELEPHONE ENCOUNTER
Eric Tomlin - Please call pt:  Yes, as we discussed at [address], she needs to stay on [address] and yes, she needs to f/u with me for ongoing management of seizures as my note indicates. It looks like she would have run out of [address] on 10/11/19 based on Rx sent in by the hospitalist.  How has she been getting her Keppra?

## 2019-11-21 ENCOUNTER — OFFICE VISIT (OUTPATIENT)
Dept: CARDIOLOGY CLINIC | Age: 59
End: 2019-11-21

## 2019-11-21 VITALS
HEART RATE: 80 BPM | BODY MASS INDEX: 51.91 KG/M2 | DIASTOLIC BLOOD PRESSURE: 92 MMHG | SYSTOLIC BLOOD PRESSURE: 142 MMHG | WEIGHT: 293 LBS | HEIGHT: 63 IN

## 2019-11-21 DIAGNOSIS — I47.20 VT (VENTRICULAR TACHYCARDIA): Primary | ICD-10-CM

## 2019-11-21 DIAGNOSIS — E66.01 OBESITY, MORBID (HCC): ICD-10-CM

## 2019-11-21 DIAGNOSIS — Z99.89 OSA ON CPAP: ICD-10-CM

## 2019-11-21 DIAGNOSIS — G47.33 OSA ON CPAP: ICD-10-CM

## 2019-11-21 DIAGNOSIS — J44.9 CHRONIC OBSTRUCTIVE PULMONARY DISEASE, UNSPECIFIED COPD TYPE (HCC): ICD-10-CM

## 2019-11-21 RX ORDER — CARVEDILOL 12.5 MG/1
12.5 TABLET ORAL 2 TIMES DAILY
Qty: 180 TAB | Refills: 3 | Status: SHIPPED | OUTPATIENT
Start: 2019-11-21 | End: 2020-05-27 | Stop reason: SDUPTHER

## 2019-11-21 NOTE — PROGRESS NOTES
Cardiac Electrophysiology OFFICE Note     Subjective:      Rosa Garcia is a 64 y.o. patient who is seen for follow up of VT  I saw her in hospital   Cardiac cath by Dr Julee Walter did not show CAD  She had  monomorphic ventricular tachycardia.     She had several runs up to 30 beats, symptomatic with dizziness. This occurred just prior to planned discharge. She did desat a couple hours prior to VT while off oxygen, but had resumed it before VT occurred. K 3.2 at the time, supplemented.       Chronic leg edema & dyspnea, was previously told that she has a large heart, reports negative stress test several years ago.     Cardiac cath with Dr. Julee Walter 9/12/2019 did not show CAD  She was hypoxemic coming in to cath lab     she does not have lung doctor so I referred her to Dr Candis Malik. Dr Gabe Baires is seeing her tomorrow  She has home O2     Previous:  Echo (09/07/2019): LVEF 56-60%, no RWMA, mild concentric LVH, grade 2 diastolic dysfunction. Mildly dilated LA. Severely elevated CVP. Mild AR.     COPD, on home oxygen.     No history CAD. Patient Active Problem List   Diagnosis Code    Seizure ([facility] [address] R56.9    Altered mental status R41.82    Elevated serum creatinine R79.89    Left-sided weakness R53.1    Essential hypertension I10    VT (ventricular tachycardia) ([facility]) I47.2    Obesity, morbid ([facility]) E66.01    Type II diabetes mellitus with complication, uncontrolled ([facility]) E11.8, E11.65    Acute on chronic respiratory failure with hypoxia and hypercapnia ([facility]) J96.21, J96.22    Edema R60.9    CORWIN and COPD overlap syndrome ([facility]) G47.33, J44.9    Chronic obstructive pulmonary disease ([facility]) J44.9    Elevated blood pressure reading in office with diagnosis of hypertension I10    Chronic bilateral low back pain M54.5, G89.29    CORWIN on CPAP G47.33, Z99.89     Current Outpatient Medications   Medication Sig Dispense Refill    carvedilol (COREG) 12.5 mg tablet Take 1 Tab by mouth two (2) times a day. Indications: high blood pressure 180 Tab 3    metFORMIN (GLUCOPHAGE) 500 mg tablet Take 1 Tab by mouth two (2) times daily (with meals). 180 Tab 1    levETIRAcetam (KEPPRA) 500 mg tablet Take 1 Tab by mouth two (2) times a day. 60 Tab 5    busPIRone (BUSPAR) 10 mg tablet TAKE 1/2 TABLETS  (ONE-HALF) TO 2 TABLETS BY MOUTH THREE TIMES DAILY AS NEEDED FOR ANXIETY 30 Tab 0    albuterol (PROVENTIL VENTOLIN) 2.5 mg /3 mL (0.083 %) nebu       torsemide (DEMADEX) 20 mg tablet Take 20 mg by mouth two (2) times a day.  aspirin 81 mg chewable tablet Take 1 Tab by mouth daily. 30 Tab 0    losartan (COZAAR) 25 mg tablet Take 25 mg by mouth daily.  naproxen (NAPROSYN) 375 mg tablet Take 375 mg by mouth two (2) times daily (with meals).  umeclidinium-vilanterol (ANORO ELLIPTA) 62.5-25 mcg/actuation inhaler Take 1 Puff by inhalation daily.  fluticasone propionate (FLOVENT DISKUS) 250 mcg/actuation dsdv Take 250 mcg by inhalation two (2) times a day.  albuterol (PROVENTIL HFA, VENTOLIN HFA, PROAIR HFA) 90 mcg/actuation inhaler Take 1 Puff by inhalation every six (6) hours as needed for Wheezing. Allergies   Allergen Reactions    Pcn [Penicillins] Unknown (comments)     Past Medical History:   Diagnosis Date    [facility] obstructive pulmonary disease ([facility] [address]     Hypertension     Seizures ([facility] [address]        Family History   Problem Relation Age of Onset    Breast Cancer Mother     Heart Attack Father     Hypertension Sister     Hypertension Brother     Cancer Maternal Aunt         \"bone\"     Social History     Tobacco Use    Smoking status: Former Smoker    Smokeless tobacco: Never Used   Substance Use Topics    Alcohol use: Not Currently        Review of Systems:   Constitutional: Negative for fever, chills, weight loss, malaise/fatigue. HEENT: Negative for nosebleeds, vision changes.    Respiratory: Negative for cough, hemoptysis  Cardiovascular: Negative for chest pain, palpitations, orthopnea, claudication, leg swelling, syncope, and PND. Gastrointestinal: Negative for nausea, vomiting, diarrhea, blood in stool and melena. Genitourinary: Negative for dysuria, and hematuria. Musculoskeletal: Negative for myalgias, arthralgia. Skin: Negative for rash. Heme: Does not bleed or bruise easily. Neurological: Negative for speech change and focal weakness     Objective:     Visit Vitals  BP (!) 142/92   Pulse 80   Ht 5' 3\" (1.6 m)   Wt 312 lb (141.5 kg)   BMI 55.27 kg/m²      Physical Exam:   Constitutional: well-developed and well-nourished. No respiratory distress. Head: Normocephalic and atraumatic. Eyes: Pupils are equal, round  ENT: hearing normal.  She is on oxygen  Neck: supple. No JVD present. Cardiovascular: Normal rate, regular rhythm. Exam reveals no gallop and no friction rub. No murmur heard. Pulmonary/Chest: Effort normal and breath sounds normal. No wheezes. Abdominal: Soft, morbidly obese  Musculoskeletal: 3+ edema. Neurological: alert, oriented. Skin: Skin is warm and dry  Psychiatric: normal mood and affect. Behavior is normal. Judgment and thought content normal.        Assessment/Plan:       ICD-10-CM ICD-9-CM    1. VT (ventricular tachycardia) ([facility]) I47.2 427.1    2. Obesity, morbid ([address] E66.01 278.01    3. Chronic obstructive pulmonary disease, unspecified COPD type ([facility] [address] J44.9 496    4.  CORWIN on CPAP G47.33 327.23     Z99.89 V46.8      reviewed diet, exercise and weight control  reviewed medications and side effects in detail   Dr Cayden Ward and I felt that she has cor pulmonale and hypoxemia driving her VT  She does not have CAD  She needs to see Dr Aimee Cortez tomorrow  She probably should see bariatric surgery clinic but before referral she needs lung evaluation first  I asked her to follow up with Dr Diane Delatorre as well and she agreed  Since she was discharged she is taking both lasix and demadex and coreg with toprol  I increase coreg to 12.5 mg bid and stopped toprol  I stopped lasix and keep her on demadex  Future Appointments   Date Time Provider Jose Valentino   1/31/2020  2:40 PM MD DOMINGUEZ Corey/ Ean Hooker   5/26/2020  3:40 PM Zoe Echeverria  [address]         Thank you for involving me in this patient's care and please call with further concerns or questions. Kim Cordero M.D.   Electrophysiology/Cardiology  [facility]  [address] [place] [address]                             [phone]

## 2019-12-04 DIAGNOSIS — F41.9 ANXIETY: ICD-10-CM

## 2019-12-04 RX ORDER — BUSPIRONE HYDROCHLORIDE 10 MG/1
TABLET ORAL
Qty: 30 TAB | Refills: 0 | Status: SHIPPED | OUTPATIENT
Start: 2019-12-04 | End: 2020-01-20

## 2020-01-18 DIAGNOSIS — F41.9 ANXIETY: ICD-10-CM

## 2020-01-20 RX ORDER — BUSPIRONE HYDROCHLORIDE 10 MG/1
TABLET ORAL
Qty: 30 TAB | Refills: 0 | Status: SHIPPED | OUTPATIENT
Start: 2020-01-20

## 2020-02-26 ENCOUNTER — HOSPITAL ENCOUNTER (OUTPATIENT)
Dept: CARDIAC REHAB | Age: 60
End: 2020-02-26

## 2020-03-23 ENCOUNTER — APPOINTMENT (OUTPATIENT)
Dept: CARDIAC REHAB | Age: 60
End: 2020-03-23

## 2020-04-15 ENCOUNTER — TELEPHONE (OUTPATIENT)
Dept: FAMILY MEDICINE CLINIC | Age: 60
End: 2020-04-15

## 2020-04-15 NOTE — TELEPHONE ENCOUNTER
Patient called back into the office stating that she just recently had an eye exam done at [address] [place] [address].

## 2020-04-15 NOTE — TELEPHONE ENCOUNTER
Called, left vm for pt to return call to office.      Please inform pt per Dr. Meneses Fleeting she is over due for eye exam.

## 2020-04-16 NOTE — TELEPHONE ENCOUNTER
Placed call to pt. Two pt identifiers confirmed. Informed pt to contact [facility] to have copy of eye exam record faxed over to Dr. Ludy Mcfarlane. Pt was provided with fax number for [facility]. Pt verbalized understanding of information discussed w/ no further questions at this time.

## 2020-05-06 ENCOUNTER — APPOINTMENT (OUTPATIENT)
Dept: CARDIAC REHAB | Age: 60
End: 2020-05-06

## 2020-05-19 ENCOUNTER — TELEPHONE (OUTPATIENT)
Dept: CARDIOLOGY CLINIC | Age: 60
End: 2020-05-19

## 2020-05-19 NOTE — TELEPHONE ENCOUNTER
5/19/2020 at 4:33-lm call to rs 5/26 3:40 in office w/Dr. Enid Gutierres to video or telephone same day, different time

## 2020-05-21 NOTE — TELEPHONE ENCOUNTER
5/21/2020 at 8:20 appointment rescheduled with patient     Future Appointments   Date Time Provider Jose Valentino   5/27/2020 11:30 AM Lidya Munoz  [address]   7/16/2020  3:40 PM Monisha Mcpherson  [facility]   7/29/2020  9:00 AM Skiff, [place] [address] [facility]      Telephone visit

## 2020-05-27 ENCOUNTER — VIRTUAL VISIT (OUTPATIENT)
Dept: CARDIOLOGY CLINIC | Age: 60
End: 2020-05-27

## 2020-05-27 VITALS — WEIGHT: 293 LBS | BODY MASS INDEX: 51.91 KG/M2 | HEIGHT: 63 IN

## 2020-05-27 DIAGNOSIS — I47.20 VT (VENTRICULAR TACHYCARDIA): Primary | ICD-10-CM

## 2020-05-27 DIAGNOSIS — Z99.89 OSA ON CPAP: ICD-10-CM

## 2020-05-27 DIAGNOSIS — J44.9 CHRONIC OBSTRUCTIVE PULMONARY DISEASE, UNSPECIFIED COPD TYPE (HCC): ICD-10-CM

## 2020-05-27 DIAGNOSIS — G47.33 OSA ON CPAP: ICD-10-CM

## 2020-05-27 DIAGNOSIS — E66.01 OBESITY, MORBID (HCC): ICD-10-CM

## 2020-05-27 RX ORDER — LOSARTAN POTASSIUM 25 MG/1
25 TABLET ORAL DAILY
Qty: 90 TAB | Refills: 3 | Status: SHIPPED | OUTPATIENT
Start: 2020-05-27 | End: 2021-04-23 | Stop reason: SDUPTHER

## 2020-05-27 RX ORDER — CARVEDILOL 12.5 MG/1
12.5 TABLET ORAL 2 TIMES DAILY
Qty: 180 TAB | Refills: 3 | Status: SHIPPED | OUTPATIENT
Start: 2020-05-27 | End: 2021-04-23 | Stop reason: SDUPTHER

## 2020-08-13 RX ORDER — LEVETIRACETAM 500 MG/1
TABLET ORAL
Qty: 60 TAB | Refills: 0 | Status: SHIPPED | OUTPATIENT
Start: 2020-08-13 | End: 2020-10-09

## 2020-10-08 NOTE — TELEPHONE ENCOUNTER
Requested Prescriptions     Pending Prescriptions Disp Refills    levETIRAcetam (KEPPRA) 500 mg tablet [Pharmacy Med Name: levETIRAcetam 500 MG Oral Tablet] 60 Tab 0     Sig: Take 1 tablet by mouth twice daily       Pt has appt for next available 1/11/2021

## 2020-10-09 RX ORDER — LEVETIRACETAM 500 MG/1
TABLET ORAL
Qty: 60 TAB | Refills: 0 | Status: SHIPPED | OUTPATIENT
Start: 2020-10-09 | End: 2020-10-16 | Stop reason: SDUPTHER

## 2020-10-09 NOTE — TELEPHONE ENCOUNTER
Saranya Vaz - pt needs earlier f/u, she has not been seen in over a year. She should have run out of Keppra prior to now. Has she been taking this? Is someone else prescribing it? Have her seen an NP at next available. Last refill before f/u.

## 2020-10-15 ENCOUNTER — TELEPHONE (OUTPATIENT)
Dept: NEUROLOGY | Facility: CLINIC | Age: 60
End: 2020-10-15

## 2020-10-16 ENCOUNTER — TELEPHONE (OUTPATIENT)
Dept: NEUROLOGY | Facility: CLINIC | Age: 60
End: 2020-10-16

## 2020-10-16 ENCOUNTER — OFFICE VISIT (OUTPATIENT)
Dept: NEUROLOGY | Facility: CLINIC | Age: 60
End: 2020-10-16
Payer: COMMERCIAL

## 2020-10-16 DIAGNOSIS — E66.01 CLASS 3 SEVERE OBESITY WITH SERIOUS COMORBIDITY AND BODY MASS INDEX (BMI) OF 50.0 TO 59.9 IN ADULT, UNSPECIFIED OBESITY TYPE (HCC): ICD-10-CM

## 2020-10-16 DIAGNOSIS — G40.109 LOCALIZATION-RELATED EPILEPSY (HCC): Primary | ICD-10-CM

## 2020-10-16 PROCEDURE — 99214 OFFICE O/P EST MOD 30 MIN: CPT | Performed by: NURSE PRACTITIONER

## 2020-10-16 RX ORDER — LEVETIRACETAM 500 MG/1
TABLET ORAL
Qty: 180 TAB | Refills: 3 | Status: SHIPPED | OUTPATIENT
Start: 2020-10-16 | End: 2021-04-21 | Stop reason: SDUPTHER

## 2020-10-16 NOTE — TELEPHONE ENCOUNTER
----- Message from Aramis sent at 10/16/2020 10:29 AM EDT -----  Regarding: GABBY Turner/Telephone  Patient return call    Caller's first and last name and relationship (if not the patient): N/A      Best contact number(s): [phone]      Whose call is being returned: Unknown      Details to clarify the request: Patient is returning someone's call from the office regarding her virtual visit this morning at 11:00. Someone from the office wanted to move her appointment earlier, which is acceptable to the patient. Warm transfer was unsuccessful. The patient is ready for her virtual appointment but has not received the link for the visit. Please send the link to [email or web address] .       Aramis

## 2021-02-11 ENCOUNTER — TELEPHONE (OUTPATIENT)
Dept: SURGERY | Age: 61
End: 2021-02-11

## 2021-02-11 NOTE — TELEPHONE ENCOUNTER
Called patient to verify information and to send bariatric paperwork to get her paperwork started for her office visit

## 2021-03-17 ENCOUNTER — CLINICAL SUPPORT (OUTPATIENT)
Dept: SURGERY | Age: 61
End: 2021-03-17
Payer: COMMERCIAL

## 2021-03-17 VITALS — HEIGHT: 63 IN | WEIGHT: 293 LBS | BODY MASS INDEX: 51.91 KG/M2

## 2021-03-17 DIAGNOSIS — G47.33 OBSTRUCTIVE SLEEP APNEA SYNDROME: ICD-10-CM

## 2021-03-17 DIAGNOSIS — R60.0 LOWER EXTREMITY EDEMA: ICD-10-CM

## 2021-03-17 DIAGNOSIS — Z01.818 PRE-OP TESTING: ICD-10-CM

## 2021-03-17 DIAGNOSIS — I10 ESSENTIAL HYPERTENSION: ICD-10-CM

## 2021-03-17 DIAGNOSIS — E66.01 MORBID OBESITY (HCC): Primary | ICD-10-CM

## 2021-03-17 DIAGNOSIS — Z99.81 OXYGEN DEPENDENT: ICD-10-CM

## 2021-03-17 DIAGNOSIS — J44.9 CHRONIC OBSTRUCTIVE PULMONARY DISEASE, UNSPECIFIED COPD TYPE (HCC): ICD-10-CM

## 2021-03-17 DIAGNOSIS — E11.8 CONTROLLED DIABETES MELLITUS TYPE 2 WITH COMPLICATIONS, UNSPECIFIED WHETHER LONG TERM INSULIN USE (HCC): ICD-10-CM

## 2021-03-17 PROCEDURE — 99203 OFFICE O/P NEW LOW 30 MIN: CPT | Performed by: NURSE PRACTITIONER

## 2021-03-17 NOTE — PROGRESS NOTES
I was in the office while conducting this encounter. Consent:  She and/or her healthcare decision maker is aware that this patient-initiated Telehealth encounter is a billable service, with coverage as determined by her insurance carrier. She is aware that she may receive a bill and has provided verbal consent to proceed: Yes    This virtual visit was conducted via [employer]. Pursuant to the emergency declaration under the [facility], [facility] waiver authority and the [employer] Act, this Virtual  Visit was conducted to reduce the patient's risk of exposure to COVID-19 and provide continuity of care for an established patient. Services were provided through a video synchronous discussion virtually to substitute for in-person clinic visit. Due to this being a TeleHealth evaluation, many elements of the physical examination are unable to be assessed. Total Time: minutes: 21-30 minutes. HISTORY OF PRESENT ILLNESS  Juana Pickens is new patient presents today for evaluation for weight loss surgery. Patient has been overweight for all her life. Her weight has ranged from 340 lbs for the past 5 years. Her heaviest weight is 360 lbs. Dietary Habits:  Breakfast- toast or cereal  Lunch-sandwich  Dinner- fish and rice  Snack- chips, granola or cereal  Liquids- water, diet soda or coffee    Prior weight loss attempts: [employer]. STOPBANG questionnaire     Do you Snore loudly? yes  Do you often feel Tired, fatigued, or sleepy during the daytime? no  Has anyone Observed you stop breathing during your sleep? yes  Are you being treated for high blood Pressure? yes  BMI more than 35 kg/m2? yes  Age over 48years old? yes  Neck Circumference >16 inches? yes  Gender male? no  ______________________________________     SCORE: 6  Patient has a hx of Sleep apnea.       If YES to 0 - 2, low risk of sleep apnea  If YES to 3 - 4 intermediate risk of having sleep apnea  If YES to 5 - 8 high risk of having sleep apnea (or 2 + BMI 35 or Neck > 17\" or Male)           Ms. Jackelyn Barron has a reminder for a \"due or due soon\" health maintenance. I have asked that she contact her primary care provider for follow-up on this health maintenance. Patient Active Problem List   Diagnosis Code    Seizure ([facility] [address] R56.9    Altered mental status R41.82    Elevated serum creatinine R79.89    Left-sided weakness R53.1    Essential hypertension I10    VT (ventricular tachycardia) ([facility]) I47.2    Obesity, morbid ([facility]) E66.01    Type II diabetes mellitus with complication, uncontrolled ([facility]) E11.8, E11.65    Acute on chronic respiratory failure with hypoxia and hypercapnia ([facility]) J96.21, J96.22    Edema R60.9    CORWIN and COPD overlap syndrome ([facility]) G47.33, J44.9    Chronic obstructive pulmonary disease ([facility]) J44.9    Elevated blood pressure reading in office with diagnosis of hypertension I10    Chronic bilateral low back pain M54.5, G89.29    CORWIN on CPAP G47.33, Z99.89       Past Medical History:   Diagnosis Date    Arthritis     Chronic obstructive pulmonary disease (HCC)     Diabetes ([facility] [address]     Hypertension     Seizures ([facility] [address]     Sleep apnea          No past surgical history on file.         Kevin Matos MD      Allergies   Allergen Reactions    Pcn [Penicillins] Unknown (comments)         Family History   Problem Relation Age of Onset   [facility] Breast Cancer Mother     Heart Attack Father     Hypertension Sister     Hypertension Brother     Cancer Maternal Aunt         \"bone\"       Social History     Socioeconomic History    Marital status: SINGLE     Spouse name: Not on file    Number of children: Not on file    Years of education: Not on file    Highest education level: Not on file   Occupational History    Occupation: Adm Asst   Social Needs    Financial resource strain: Not on file    Food insecurity     Worry: Not on file Inability: Not on file    Transportation needs     Medical: Not on file     Non-medical: Not on file   Tobacco Use    Smoking status: Former Smoker    Smokeless tobacco: Never Used   Substance and Sexual Activity    Alcohol use: Not Currently    Drug use: Not Currently    Sexual activity: Not Currently   Lifestyle    Physical activity     Days per week: Not on file     Minutes per session: Not on file    Stress: Not on file   Relationships    Social connections     Talks on phone: Not on file     Gets together: Not on file     Attends Oriental orthodox service: Not on file     Active member of club or organization: Not on file     Attends meetings of clubs or organizations: Not on file     Relationship status: Not on file    Intimate partner violence     Fear of current or ex partner: Not on file     Emotionally abused: Not on file     Physically abused: Not on file     Forced sexual activity: Not on file   Other Topics Concern    Not on file   Social History Narrative    Lives in [place] [address] Patient  Associated symptoms include shortness of breath. Pertinent negatives include no chest pain, no abdominal pain and no headaches. Morbid Obesity  Associated symptoms include shortness of breath. Pertinent negatives include no chest pain, no abdominal pain and no headaches. Weight Loss  Associated symptoms include shortness of breath. Pertinent negatives include no chest pain, no abdominal pain and no headaches. Review of Systems   Constitutional: Negative for chills, fever and malaise/fatigue. HENT: Positive for congestion. Negative for hearing loss, sinus pain, sore throat and tinnitus. Eyes: Negative for blurred vision, double vision, pain, discharge and redness. Wears glasses   Respiratory: Positive for shortness of breath. Negative for cough, sputum production and wheezing. On Oxygen-2 liters all day   Cardiovascular: Positive for leg swelling (lower ankles).  Negative for chest pain and palpitations. Gastrointestinal: Positive for constipation and heartburn (when she eats certain foods. ). Negative for abdominal pain, diarrhea, nausea and vomiting. Genitourinary: Negative for dysuria, frequency and urgency. Musculoskeletal: Positive for back pain and joint pain. Negative for neck pain. Skin: Negative for itching and rash. Dry skin   Neurological: Negative for dizziness and headaches. Psychiatric/Behavioral: Positive for depression. The patient is nervous/anxious and has insomnia (sometimes she does sleep at all. Takes Melatonin or Tylenol PM. ). Physical Exam  HENT:      Mouth/Throat:      Mouth: Mucous membranes are moist.   Pulmonary:      Effort: No respiratory distress. Comments: On oxygen. Abdominal:      Tenderness: There is no abdominal tenderness. Musculoskeletal:         General: Swelling present. Right lower leg: Edema present. Left lower leg: Edema present. Neurological:      Mental Status: She is alert and oriented to person, place, and time. ASSESSMENT and PLAN      Sleeve gastrectomy or vertical gastric resection involves a resection of the vast majority of the stomach usually done with a dilator pressed against the right half of the stomach of the lesser curvature. One preserves the pyloric sphincter at the lower end of the stomach and then sequentially staples and divides all the way up to the gastroesophageal junction leaving a small rim of the stomach to the left better known as the angle of His. This procedure significantly reduces the amount that the stomach can hold while removing the part of the stomach that secretes the hormone grehlin and alters the speed of food emptying from the stomach. Once food leaves the stomach, the remainder of the intestinal tract is completely intact and there is no effect on the digestion or absorption of food nutrients and calories.  The procedure is intermediate between the adjustable gastric band and the gastric bypass as far as weight loss, potential complications and resolution of comorbid diseases such as Type 2 diabetes, high blood pressure, sleep apnea, arthritic pain, cholesterol disorders to mention a few. The usual complications are that of any procedure which affects the ability of the stomach to accept food at any given time. Those are usually nausea and vomiting which either spontaneously resolve or require endoscopic dilatation. The most serious complication from this surgery is a leak from the staple line usually near the  gastroesophageal junction. The frequency of this serious complication is low and usually heals spontaneously although reoperation may be necessary. The other serious complications are those which can occur with any major surgery and include  Infection, bleeding, blood clots and/or cardiopulmonary problems. Patient meets criteria established by the NIH. Without weight reduction, co-morbidities will escalate as well as risk of early mortality. Recommendation is patient could be served with surgical weight reduction, the procedure of Sleeve gastrectomy. I explained to the patient differences between laparoscopic and open gastric bypass, laparoscopic adjustable gastric banding, and sleeve gastrectomy procedures. Patient has attended one our informational meeting and has seen our educational materials. Patient desires to have surgery with Dr. Jose Angel Villegas. I have reinforced without lifestyle change and behavior modification, they would not achieve his weight loss goals. I reviewed risks and complications associated with each procedure. Patient has been sent to [facility] for H. Pylori for  testing. Will treat based on results. She will need an UGI. Other recommendations include psychological evaluation, nutritional consultation, cardiology (Dr. Samia Chung) and pulmonary. ( Dr. Cheri Martinez) . She will need to lose 10% of her body weight before surgery, approximately 34 lbs. I discussed with patient a diet high in protein, low-fat, low- sugar, limited carbohydrates, and discontinuing use of carbonated beverages. Also discussed physical activity and exercises. I have answered all questions, they wish to proceed. 30 Minutes spent face to face with patient, >50 % of time spent counseling.    ** Copy to PCP and other providers

## 2021-03-17 NOTE — PROGRESS NOTES
1. Have you been to the ER, urgent care clinic since your last visit? Hospitalized since your last visit? No    2. Have you seen or consulted any other health care providers outside of the [address] [place] [address] since your last visit? Include any pap smears or colon screening. No    Patient states she does not own a home scale. States it has been more than 2 months since she last weighed, thinks it was around 340 lbs.

## 2021-04-01 ENCOUNTER — TELEPHONE (OUTPATIENT)
Dept: CARDIOLOGY CLINIC | Age: 61
End: 2021-04-01

## 2021-04-02 RX ORDER — METFORMIN HYDROCHLORIDE 500 MG/1
500 TABLET ORAL 2 TIMES DAILY WITH MEALS
Qty: 180 TAB | Refills: 0 | Status: SHIPPED | OUTPATIENT
Start: 2021-04-02 | End: 2021-06-10

## 2021-04-02 NOTE — TELEPHONE ENCOUNTER
Returned patient call. Verified patient by two identifiers. Patient stated that she would like to reschedule her May appointment with Dr. Renell Dancer to an April appointment. I rescheduled patient appointment with Dr. Renell Dancer to 4/12/2021. I requested to coordinate a fu appointment with Dr. Alka Mclean and patient stated that when she seen Dr. Alka Mclean last, he referred her to Dr. Renell Dancer and she would like to just keep her appointment with Dr. Renell Dancer at this time and schedule with Dr. Alka Mclean at another date.     Future Appointments   Date Time Provider Jose Valentino   4/6/2021 12:15 PM GABBY Vieira BS AMB   4/12/2021  3:40 PM MD MIGUEL ANGEL Estrella BS AMB   4/15/2021  2:15 PM Brissa Jones MD [facility] AMBER BS AMB

## 2021-04-02 NOTE — TELEPHONE ENCOUNTER
Last visit 09/18/2019 MD Read   Next appointment Nothing scheduled   Previous refill encounter(s)   12/31/2020 Glucophage #60     Requested Prescriptions     Pending Prescriptions Disp Refills    metFORMIN (GLUCOPHAGE) 500 mg tablet 180 Tab 0     Sig: Take 1 Tab by mouth two (2) times daily (with meals).

## 2021-04-21 ENCOUNTER — OFFICE VISIT (OUTPATIENT)
Dept: NEUROLOGY | Age: 61
End: 2021-04-21
Payer: COMMERCIAL

## 2021-04-21 VITALS
RESPIRATION RATE: 16 BRPM | OXYGEN SATURATION: 98 % | WEIGHT: 293 LBS | BODY MASS INDEX: 51.91 KG/M2 | HEART RATE: 77 BPM | DIASTOLIC BLOOD PRESSURE: 100 MMHG | HEIGHT: 63 IN | SYSTOLIC BLOOD PRESSURE: 144 MMHG

## 2021-04-21 DIAGNOSIS — E66.01 CLASS 3 SEVERE OBESITY WITH SERIOUS COMORBIDITY AND BODY MASS INDEX (BMI) OF 50.0 TO 59.9 IN ADULT, UNSPECIFIED OBESITY TYPE (HCC): ICD-10-CM

## 2021-04-21 DIAGNOSIS — G40.109 LOCALIZATION-RELATED EPILEPSY (HCC): Primary | ICD-10-CM

## 2021-04-21 PROCEDURE — 99213 OFFICE O/P EST LOW 20 MIN: CPT | Performed by: NURSE PRACTITIONER

## 2021-04-21 RX ORDER — LEVETIRACETAM 500 MG/1
TABLET ORAL
Qty: 180 TAB | Refills: 3 | Status: SHIPPED | OUTPATIENT
Start: 2021-04-21

## 2021-04-21 NOTE — PROGRESS NOTES
patient states she has not had a seizure since her hospital visit which was 2019    Medication refill: keppra for a year    Follow up: applying for disability and weight loss surgery    . Chief Complaint   Patient presents with    Follow-up     applying for disbility and weight loss surgery    Medication Refill     keppra for a year    Seizure     patient states she has not had a seizure since her hospital visit which was 2019     .   Visit Vitals  BP (!) 144/100 (BP 1 Location: Left upper arm, BP Patient Position: Sitting)   Pulse 77   Resp 16   Ht 5' 3\" (1.6 m)   Wt 340 lb (154.2 kg)   SpO2 98%   BMI 60.23 kg/m²

## 2021-04-22 NOTE — PROGRESS NOTES
Date:  21     Name:  Ly Jacob  :  1960  MRN:  [id number]     PCP:  Brandon Zaidi MD    Chief Complaint   Patient presents with    Follow-up     applying for disbility and weight loss surgery    Medication Refill     keppra for a year    Seizure     patient states she has not had a seizure since her hospital visit which was 2019       HISTORY OF PRESENT ILLNESS:Follow up visit for seizure. She is being maintain on Keppra 500 mg BID and doing well. No seizure or seizure like activity. No concern at today visit. She is now in the process to get  bariatric surgery, but recently lost her job and concern about insurance now. Today appointment was to get refills on her medications        Current Outpatient Medications   Medication Sig    levETIRAcetam (KEPPRA) 500 mg tablet Take 1 tablet by mouth twice daily    metFORMIN (GLUCOPHAGE) 500 mg tablet Take 1 Tab by mouth two (2) times daily (with meals).  losartan (COZAAR) 25 mg tablet Take 1 Tab by mouth daily.  busPIRone (BUSPAR) 10 mg tablet TAKE 1/2 TABLETS BY MOUTH TO 2 THREE TIMES DAILY AS NEEDED FOR ANXIETY    albuterol (PROVENTIL VENTOLIN) 2.5 mg /3 mL (0.083 %) nebu     torsemide (DEMADEX) 20 mg tablet Take 20 mg by mouth two (2) times a day.  aspirin 81 mg chewable tablet Take 1 Tab by mouth daily.  naproxen (NAPROSYN) 375 mg tablet Take 375 mg by mouth two (2) times daily (with meals).  umeclidinium-vilanterol (ANORO ELLIPTA) 62.5-25 mcg/actuation inhaler Take 1 Puff by inhalation daily.  fluticasone propionate (FLOVENT DISKUS) 250 mcg/actuation dsdv Take 250 mcg by inhalation two (2) times a day.  albuterol (PROVENTIL HFA, VENTOLIN HFA, PROAIR HFA) 90 mcg/actuation inhaler Take 1 Puff by inhalation every six (6) hours as needed for Wheezing.  carvediloL (COREG) 12.5 mg tablet Take 1 Tab by mouth two (2) times a day.  Indications: high blood pressure     No current facility-administered medications for this visit.       Allergies   Allergen Reactions    Pcn [Penicillins] Unknown (comments)     Past Medical History:   Diagnosis Date    Arthritis     [facility] disease ([facility]     Diabetes ([facility]     Hypertension     Seizures ([facility]     Sleep apnea      Past Surgical History:   Procedure Laterality Date    HX DILATION AND CURETTAGE      HX DILATION AND CURETTAGE       Social History     Socioeconomic History    Marital status: SINGLE     Spouse name: Not on file    Number of children: Not on file    Years of education: Not on file    Highest education level: Not on file   Occupational History    Occupation: Adm Asst   Social Needs    Financial resource strain: Not on file    Food insecurity     Worry: Not on file     Inability: Not on file    Transportation needs     Medical: Not on file     Non-medical: Not on file   Tobacco Use    Smoking status: Former Smoker    Smokeless tobacco: Former User     Quit date: 3/17/2019   Substance and Sexual Activity    Alcohol use: Not Currently    Drug use: Not Currently    Sexual activity: Not Currently   Lifestyle    Physical activity     Days per week: Not on file     Minutes per session: Not on file    Stress: Not on file   Relationships    Social connections     Talks on phone: Not on file     Gets together: Not on file     Attends Restorationist service: Not on file     Active member of club or organization: Not on file     Attends meetings of clubs or organizations: Not on file     Relationship status: Not on file    Intimate partner violence     Fear of current or ex partner: Not on file     Emotionally abused: Not on file     Physically abused: Not on file     Forced sexual activity: Not on file   Other Topics Concern    Not on file   Social History Narrative    Lives in [address] [place] [address] [address] History   Problem Relation Age of Onset    Breast Cancer Mother     Heart Attack Father     Hypertension Sister     Hypertension Brother    Umñiz Cancer Maternal Aunt         \"bone\"       PHYSICAL EXAMINATION:    Visit Vitals  BP (!) 144/100 (BP 1 Location: Left upper arm, BP Patient Position: Sitting)   Pulse 77   Resp 16   Ht 5' 3\" (1.6 m)   Wt 340 lb (154.2 kg)   SpO2 98%   BMI 60.23 kg/m²         ASSESSMENT AND PLAN    ICD-10-CM ICD-9-CM    1. Localization-related epilepsy ([facility]  G40.109 345.50    2. Class 3 severe obesity with serious comorbidity and body mass index (BMI) of 50.0 to 59.9 in adult, unspecified obesity type ([facility]  E66.01 278.01     Z68.43 V85.43      1. Localization-related epilepsy ([facility]   -continue Keppra 500mg BID    - discussed plans for medication once she loses her  insurance.        2. Class 3 severe obesity with serious comorbidity and body mass index (BMI) of 50.0 to 59.9 in adult, unspecified obesity type ([facility]     Follow up in 1 year   Elise Bahena NP

## 2021-04-23 ENCOUNTER — OFFICE VISIT (OUTPATIENT)
Dept: CARDIOLOGY CLINIC | Age: 61
End: 2021-04-23
Payer: COMMERCIAL

## 2021-04-23 VITALS
SYSTOLIC BLOOD PRESSURE: 130 MMHG | DIASTOLIC BLOOD PRESSURE: 80 MMHG | BODY MASS INDEX: 51.91 KG/M2 | WEIGHT: 293 LBS | HEART RATE: 59 BPM | RESPIRATION RATE: 16 BRPM | HEIGHT: 63 IN | OXYGEN SATURATION: 97 %

## 2021-04-23 DIAGNOSIS — Z01.810 PREOP CARDIOVASCULAR EXAM: ICD-10-CM

## 2021-04-23 DIAGNOSIS — J44.9 CHRONIC OBSTRUCTIVE PULMONARY DISEASE, UNSPECIFIED COPD TYPE (HCC): ICD-10-CM

## 2021-04-23 DIAGNOSIS — E66.01 OBESITY, MORBID (HCC): ICD-10-CM

## 2021-04-23 DIAGNOSIS — I47.20 VT (VENTRICULAR TACHYCARDIA): Primary | ICD-10-CM

## 2021-04-23 PROCEDURE — 93000 ELECTROCARDIOGRAM COMPLETE: CPT | Performed by: INTERNAL MEDICINE

## 2021-04-23 PROCEDURE — 99243 OFF/OP CNSLTJ NEW/EST LOW 30: CPT | Performed by: INTERNAL MEDICINE

## 2021-04-23 RX ORDER — LOSARTAN POTASSIUM 25 MG/1
25 TABLET ORAL DAILY
Qty: 90 TAB | Refills: 3 | Status: SHIPPED | OUTPATIENT
Start: 2021-04-23 | End: 2022-05-31

## 2021-04-23 RX ORDER — BISMUTH SUBSALICYLATE 262 MG
1 TABLET,CHEWABLE ORAL DAILY
COMMUNITY

## 2021-04-23 RX ORDER — ACETAMINOPHEN 325 MG/1
975 TABLET ORAL
COMMUNITY

## 2021-04-23 RX ORDER — CARVEDILOL 12.5 MG/1
12.5 TABLET ORAL 2 TIMES DAILY
Qty: 180 TAB | Refills: 3 | Status: SHIPPED | OUTPATIENT
Start: 2021-04-23 | End: 2022-05-31

## 2021-04-23 RX ORDER — TORSEMIDE 20 MG/1
20 TABLET ORAL 2 TIMES DAILY
Qty: 180 TAB | Refills: 3 | Status: SHIPPED | OUTPATIENT
Start: 2021-04-23 | End: 2022-05-31

## 2021-04-23 NOTE — PROGRESS NOTES
[place]: Ter[place] Client  [phone]     History of Present Illness:  Ms. Dominic Liao is a 63 yo F pt followed by Dr. Levonne Kussmaul, history of ventricular tachycardia, subsequent cardiac catheterization in 2019 demonstrated no significant CAD and echocardiogram was normal, COPD, morbid obesity. She is here for a preop cardiac evaluation prior to gastric bypass surgery. From a symptom standpoint, she denies any exertional chest pain. She has baseline shortness of breath, which is unchanged. She denies any palpitations, lightheadedness or dizziness. She is compensated on exam with clear lungs. She does have chronic lower extremity edema unchanged. Assessment and Plan:   1. Preop cardiac evaluation. She is stable cardiac wise and can proceed with her surgery. Cardiac catheterization and echocardiogram in 2019 were both normal.    2. Ventricular tachycardia. Followed by Dr. Levonne Kussmaul. 3. Morbid obesity. She is working on diet and exercise and considering gastric surgery as noted above. 4. COPD. Followed by pulmonary. 5. Essential hypertension. Blood pressure is controlled. She  has a past medical history of Arthritis, Chronic obstructive pulmonary disease ([address] Diabetes ([address] Hypertension, Seizures ([address] and Sleep apnea. All other systems negative except as above. PE  Vitals:    04/23/21 1144   BP: 130/80   Pulse: (!) 59   Resp: 16   SpO2: 97%   Weight: 338 lb 3.2 oz (153.4 kg)   Height: 5' 3\" (1.6 m)    Body mass index is 59.91 kg/m².    General appearance - alert, obese, and in no distress  Mental status - affect appropriate to mood  Eyes - sclera anicteric, moist mucous membranes  Neck - supple, no JVD  Chest - clear to auscultation, no wheezes, rales or rhonchi  Heart - normal rate, regular rhythm, normal S1, S2, no murmurs, rubs, clicks or gallops  Abdomen - soft, nontender, nondistended, no masses or organomegaly  Extremities - peripheral pulses normal,trace pedal edema    Recent Labs:  Lab Results   Component Value Date/Time    Cholesterol, total 175 09/06/2019 08:56 AM    HDL Cholesterol 39 09/06/2019 08:56 AM    LDL, calculated 97.8 09/06/2019 08:56 AM    Triglyceride 191 (H) 09/06/2019 08:56 AM    CHOL/HDL Ratio 4.5 09/06/2019 08:56 AM     Lab Results   Component Value Date/Time    Creatinine 1.04 (H) 09/13/2019 02:06 AM     Lab Results   Component Value Date/Time    BUN 27 (H) 09/13/2019 02:06 AM     Lab Results   Component Value Date/Time    Potassium 4.2 09/13/2019 02:06 AM     Lab Results   Component Value Date/Time    Hemoglobin A1c 7.2 (H) 09/06/2019 08:56 AM     Lab Results   Component Value Date/Time    HGB 13.3 09/13/2019 02:06 AM     Lab Results   Component Value Date/Time    PLATELET 075 01/21/0673 02:06 AM       Reviewed:  Past Medical History:   Diagnosis Date    Arthritis     Chronic [facility] pulmonary disease ([facility]     Diabetes ([facility]     Hypertension     Seizures ([facility]     Sleep apnea      Social History     Tobacco Use   Smoking Status Former Smoker   Smokeless Tobacco Former User    Quit date: 3/17/2019     Social History     Substance and Sexual Activity   Alcohol Use Not Currently     Allergies   Allergen Reactions    Pcn [Penicillins] Unknown (comments)       Current Outpatient Medications   Medication Sig    multivitamin (ONE A DAY) tablet Take 1 Tab by mouth daily.  Oxygen 2 liters via NC continuously.  acetaminophen (TylenoL) 325 mg tablet Take 975 mg by mouth every four (4) hours as needed for Pain.  acetaminophen/diphenhydramine (TYLENOL PM PO) Take  by mouth as needed.  levETIRAcetam (KEPPRA) 500 mg tablet Take 1 tablet by mouth twice daily    metFORMIN (GLUCOPHAGE) 500 mg tablet Take 1 Tab by mouth two (2) times daily (with meals).  carvediloL (COREG) 12.5 mg tablet Take 1 Tab by mouth two (2) times a day. Indications: high blood pressure    losartan (COZAAR) 25 mg tablet Take 1 Tab by mouth daily.     busPIRone (BUSPAR) 10 mg tablet TAKE 1/2 TABLETS BY MOUTH TO 2 THREE TIMES DAILY AS NEEDED FOR ANXIETY    albuterol (PROVENTIL VENTOLIN) 2.5 mg /3 mL (0.083 %) nebu daily as needed.  torsemide (DEMADEX) 20 mg tablet Take 20 mg by mouth two (2) times a day.  aspirin 81 mg chewable tablet Take 1 Tab by mouth daily.  umeclidinium-vilanterol (ANORO ELLIPTA) 62.5-25 mcg/actuation inhaler Take 1 Puff by inhalation daily.  fluticasone propionate (FLOVENT DISKUS) 250 mcg/actuation dsdv Take 250 mcg by inhalation two (2) times a day.  albuterol (PROVENTIL HFA, VENTOLIN HFA, PROAIR HFA) 90 mcg/actuation inhaler Take 1 Puff by inhalation every six (6) hours as needed for Wheezing.  naproxen (NAPROSYN) 375 mg tablet Take 375 mg by mouth two (2) times daily (with meals). No current facility-administered medications for this visit.         Francisco Javier Martinez MD  [facility] and [facility] [place]  [address] [facility] [address]  [address]

## 2021-04-23 NOTE — LETTER
Patient:  Madhav Vargas YOB: 1960 Date of Visit: 4/23/2021 Dear Eliane Pierre MD 
[address] [place] [address]  [place] [id number] [address] [employer] Andrea Goetz MD 
[address] [place] [phone] [address] In Basket: Thank you for referring Ms. Adamaris Wiley to me for evaluation/treatment. Below are the relevant portions of my assessment and plan of care. Ms. Betina Viramontes is a 63 yo F pt followed by Dr. Kirsty Zuniga, history of ventricular tachycardia, subsequent cardiac catheterization in 2019 demonstrated no significant CAD and echocardiogram was normal, COPD, morbid obesity. She is here for a preop cardiac evaluation prior to gastric bypass surgery. From a symptom standpoint, she denies any exertional chest pain. She has baseline shortness of breath, which is unchanged. She denies any palpitations, lightheadedness or dizziness. She is compensated on exam with clear lungs. She does have chronic lower extremity edema unchanged. Assessment and Plan: 1. Preop cardiac evaluation. She is stable cardiac wise and can proceed with her surgery. Cardiac catheterization and echocardiogram in 2019 were both normal.   
2. Ventricular tachycardia. Followed by Dr. Kirsty Zuniga. 3. Morbid obesity. She is working on diet and exercise and considering gastric surgery as noted above. 4. COPD. Followed by pulmonary. 5. Essential hypertension. Blood pressure is controlled. If you have questions, please do not hesitate to call me. Sincerely, Juliette Mcdonnell MD

## 2021-04-23 NOTE — PROGRESS NOTES
Recently was \"laid off\" from job of 8 years. To complete paperwork for disability. Clearance for weight loss surgery. Chief Complaint   Patient presents with    Follow-up     annual.  Denies chest pain/dizziness/swelling.   continued swelling/shortness of breath (COPD)     Visit Vitals  /80 (BP 1 Location: Right upper arm, BP Patient Position: Sitting, BP Cuff Size: Thigh)   Pulse (!) 59   Resp 16   Ht 5' 3\" (1.6 m)   Wt 338 lb 3.2 oz (153.4 kg)   SpO2 97%   BMI 59.91 kg/m²
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
## 2021-05-03 ENCOUNTER — CLINICAL SUPPORT (OUTPATIENT)
Dept: SURGERY | Age: 61
End: 2021-05-03

## 2021-05-03 DIAGNOSIS — E66.01 MORBID OBESITY (HCC): Primary | ICD-10-CM

## 2021-05-03 NOTE — PROGRESS NOTES
Pre-operative Bariatric Nutrition Evaluation ()     Date: 5/3/2021   Wendy Ortiz M.D. Name: Waylon Vasquez  :  1960  Age:  64  Gender: Female   Type of Surgery: []           Gastric Bypass   [x]           Sleeve Gastrectomy    ASSESSMENT:    Past Medical History:COPD, HTN, Type II DM     Medications/Supplements:   Prior to Admission medications    Medication Sig Start Date End Date Taking? Authorizing Provider   multivitamin (ONE A DAY) tablet Take 1 Tab by mouth daily. Provider, Historical   Oxygen 2 liters via NC continuously. Provider, Historical   acetaminophen (TylenoL) 325 mg tablet Take 975 mg by mouth every four (4) hours as needed for Pain. Provider, Historical   acetaminophen/diphenhydramine (TYLENOL PM PO) Take  by mouth as needed. Provider, Historical   carvediloL (COREG) 12.5 mg tablet Take 1 Tab by mouth two (2) times a day. Indications: high blood pressure 21   Jonathan Cason MD   losartan (COZAAR) 25 mg tablet Take 1 Tab by mouth daily. 21   Jonathan Cason MD   torsemide [facility]) 20 mg tablet Take 1 Tab by mouth two (2) times a day. 21   Jonathan Cason MD   levETIRAcetam (KEPPRA) 500 mg tablet Take 1 tablet by mouth twice daily 21   Harmeet Back NP   metFORMIN (GLUCOPHAGE) 500 mg tablet Take 1 Tab by mouth two (2) times daily (with meals). 21   Cristino Mckinney MD   busPIRone (BUSPAR) 10 mg tablet TAKE 1/2 TABLETS BY MOUTH TO 2 THREE TIMES DAILY AS NEEDED FOR ANXIETY 20   Franco Matos MD   albuterol (PROVENTIL VENTOLIN) 2.5 mg /3 mL (0.083 %) nebu daily as needed. 19   Provider, Historical   aspirin 81 mg chewable tablet Take 1 Tab by mouth daily. 19   Laureen Beckham MD   naproxen (NAPROSYN) 375 mg tablet Take 375 mg by mouth two (2) times daily (with meals). Other, MD Heidi   umeclidinium-vilanterol (ANORO ELLIPTA) 62.5-25 mcg/actuation inhaler Take 1 Puff by inhalation daily.     Other, MD Heidi   fluticasone propionate (FLOVENT DISKUS) 250 mcg/actuation dsdv Take 250 mcg by inhalation two (2) times a day. Other, MD Heidi   albuterol (PROVENTIL HFA, VENTOLIN HFA, PROAIR HFA) 90 mcg/actuation inhaler Take 1 Puff by inhalation every six (6) hours as needed for Wheezing. Provider, Historical       Food Allergies/Intolerances:no food allergies    Anthropometrics:    Ht:63\"    Recent Office Wt: 338#    IBW: 115#    %IBW: 293%     BMI:59    Category: obesity III     Reported wt history: Pt completing pre-op nutrition evaluation for wt loss surgery over the phone d/t social distancing guidelines d/t COVID-19. Attributes wt gain over the years r/t medical conditions that have limited physical activity. Has attempted wt loss through various methods with most successful wt loss of 20# . Has been unable to maintain long term or significant wt loss and is now seeking approval for weight loss surgery. Pt will need to complete 12 visits for supervised weight loss for insurance requirements. Exercise/Physical Activity:none at present d/t severe COPD    Reported Diet History:Nutrisystem, Weight Watchers    24 Hour Diet Recall - 2 meals per day   Breakfast  Usually skips    Lunch  Protein shake or fast food or tuna sandwich or PB&J sandwich    Dinner 6:30 pm  Chicken or fish, pasta, pizza or [place] food on the weekends   Snacks  Pretzels, yogurt, cottage cheese, sugar free gelatin    Beverages  Water, coffee, orange juice, sometimes tea or occasional soda       Environment/Psychosocial/Support:Pt reports good support system and has several family members who have had wt loss surgery. Pt lives with 4 roommates in her household and they do the grocery shopping and cooking. Pt recently lost her job on March 31, 2021 which will likely change her insurance requirements. NUTRITION DIAGNOSIS:  1.  Inadequate protein intake r/t skipping meals evidenced by pt with average protein intake of 20-40 grams protein per day. 2. Physical inactivity r/t health conditions that limit activity evidenced by pt with no current exercise. NUTRITION INTERVENTION:  Pt educated on nutrition recommendations for weight loss surgery, specifically sleeve gastrectomy. Instructed on consuming 3 meals per day starting now. Use the balanced plate method to plan meals, include 3 oz of lean source of protein, 1/2 cup whole grains, unlimited non-starchy vegetables, 1/2 cup fruit and 1 serving of low fat dairy. Utilize handouts listing healthy snack and meal ideas to limit restaurant meals. After surgery measure all meals to 1/2 cup. Each meal will contain a 1/4 cup lean protein and 1/4 cup fruit, non-starchy vegetable or starch (limiting to once per day). Aim for 60 g protein per day. Sip on 48-64 oz of sugar free, calorie free, non-carbonated beverages each day. Do not use a straw. Do not consume beverages 30 minutes before, during or 30 minutes after meals. Read all nutrition labels. Demonstrated and emphasized identifying serving size, total fat, sugar and protein content. Defined low fat as </= 3 g per serving. Discussed lean and extra lean sources of protein. Provided list of low fat cooking methods. Avoid foods with sugar listed in the first 3 ingredients and >/15 g sugar per serving. Excess sugar/fat intake may lead to dumping syndrome. Discussed signs and symptoms of dumping syndrome. Practice mindful eating habits; take small bites, chew thoroughly, avoid distractions, utilize hunger/fullness scale. Consume meals over 20-30 minutes. Attend Bariatric Support Group and increase physical activity (approved per MD) for long term weight maintenance. NUTRITION MONITORING AND EVALUATION:    The following goals were established with patient;  1. Increase protein intake by eating 3 meals a day. Aim for a minimum 60 grams per day. 2. Consider seated/chair exercises if tolerated.  Emailed [facility] exercise videos as examples. 3. Continue to review nutrition education materials. Follow up next month for continued nutrition education and supervised weight loss. Specific tips and techniques to facilitate compliance with above recommendations were provided and discussed. Nutrition evaluation reveals important lifestyle changes are indicated. Goals set and recommendations made. Will continue to assess. If further details are desired please feel free to contact me at [phone]. This phone number was also provided to the patient for any further questions or concerns.            Johnathan Sanchez RD

## 2021-05-05 ENCOUNTER — HOSPITAL ENCOUNTER (OUTPATIENT)
Dept: GENERAL RADIOLOGY | Age: 61
Discharge: HOME | End: 2021-05-05
Attending: NURSE PRACTITIONER
Payer: COMMERCIAL

## 2021-05-05 ENCOUNTER — TRANSCRIBE ORDER (OUTPATIENT)
Dept: REGISTRATION | Age: 61
End: 2021-05-05

## 2021-05-05 DIAGNOSIS — J44.9 COPD (CHRONIC OBSTRUCTIVE PULMONARY DISEASE) (HCC): Primary | ICD-10-CM

## 2021-05-05 DIAGNOSIS — Z01.818 PRE-OP TESTING: ICD-10-CM

## 2021-05-05 DIAGNOSIS — J44.9 COPD (CHRONIC OBSTRUCTIVE PULMONARY DISEASE) (HCC): ICD-10-CM

## 2021-05-05 DIAGNOSIS — E66.01 MORBID OBESITY (HCC): ICD-10-CM

## 2021-05-05 PROCEDURE — 74246 X-RAY XM UPR GI TRC 2CNTRST: CPT

## 2021-05-05 PROCEDURE — 71046 X-RAY EXAM CHEST 2 VIEWS: CPT

## 2021-05-11 ENCOUNTER — OFFICE VISIT (OUTPATIENT)
Dept: INTERNAL MEDICINE CLINIC | Age: 61
End: 2021-05-11
Payer: COMMERCIAL

## 2021-05-11 VITALS
HEIGHT: 63 IN | DIASTOLIC BLOOD PRESSURE: 80 MMHG | TEMPERATURE: 98.1 F | SYSTOLIC BLOOD PRESSURE: 146 MMHG | RESPIRATION RATE: 14 BRPM | WEIGHT: 293 LBS | HEART RATE: 78 BPM | OXYGEN SATURATION: 97 % | BODY MASS INDEX: 51.91 KG/M2

## 2021-05-11 DIAGNOSIS — Z01.419 WELL WOMAN EXAM: Primary | ICD-10-CM

## 2021-05-11 DIAGNOSIS — I10 UNCONTROLLED HYPERTENSION: ICD-10-CM

## 2021-05-11 DIAGNOSIS — Z12.4 CERVICAL CANCER SCREENING: ICD-10-CM

## 2021-05-11 DIAGNOSIS — E66.01 CLASS 3 SEVERE OBESITY WITH SERIOUS COMORBIDITY AND BODY MASS INDEX (BMI) OF 60.0 TO 69.9 IN ADULT, UNSPECIFIED OBESITY TYPE (HCC): ICD-10-CM

## 2021-05-11 DIAGNOSIS — Z11.59 NEED FOR HEPATITIS C SCREENING TEST: ICD-10-CM

## 2021-05-11 DIAGNOSIS — Z12.39 ENCOUNTER FOR SCREENING FOR MALIGNANT NEOPLASM OF BREAST, UNSPECIFIED SCREENING MODALITY: ICD-10-CM

## 2021-05-11 PROCEDURE — 99214 OFFICE O/P EST MOD 30 MIN: CPT | Performed by: FAMILY MEDICINE

## 2021-05-11 PROCEDURE — 99396 PREV VISIT EST AGE 40-64: CPT | Performed by: FAMILY MEDICINE

## 2021-05-11 NOTE — PROGRESS NOTES
Chief Complaint   Patient presents with    Complete Physical     she is a 64y.o. year old female who presents for CPE  Complete Physical Exam Questions:    LMP - menopausal   Last Pap (q 3 years, or q5 with HPV) - unknown   Last Mammogram (54-69 biennially)- unknown   Hx of abnl Pap - No    1. Do you follow a low fat diet?  no  2. Are you up to date on your Tdap (<10 years)? Yes  3. Have you ever had a Pneumovax vaccine (>65)? Yes   PCV13 No   PPSV23 Yes  4. Have you had Zoster vaccine (>60)? No  5. Have you had the HPV - Gardasil (13- 26)? No  6. Do you follow an exercise program?  no  7. Do you smoke?  no  8. Do you consider yourself overweight?  yes  9. Is there a family history of CAD< age 48? Yes  10. Is there a family history of Cancer?  yes  11. Do you know your Cancer risks? Yes  12. Have you had a colonoscopy?  no  13. Have you been tested for HIV or other STI's? No HIV testing today(18-66 y/o)? No  14. Have had a bone density scan or DEXA done(>65)? No  15. Have you had an EKG performed in the last five years (>50)? Yes    Other complaints: back pain     Reviewed and agree with Nurse Note and duplicated in this note. Reviewed PmHx, RxHx, FmHx, SocHx, AllgHx and updated and dated in the chart.     Family History   Problem Relation Age of Onset   [facility] Breast Cancer Mother     Heart Attack Father     Hypertension Sister     Hypertension Brother     Cancer Maternal Aunt         \"bone\"       Past Medical History:   Diagnosis Date    Arthritis     [facility] disease [facility] [address]     Diabetes [facility] [address]     Hypertension     Seizures ([facility] [address] [facility]     Sleep apnea       Social History     Socioeconomic History    Marital status: SINGLE     Spouse name: Not on file    Number of children: Not on file    Years of education: Not on file    Highest education level: Not on file   Occupational History    Occupation: Adm Asst   Tobacco Use    Smoking status: Former Smoker    Smokeless tobacco: Former User     Quit date: 3/17/2019   Substance and Sexual Activity    Alcohol use: Not Currently    Drug use: Not Currently    Sexual activity: Not Currently   Social History Narrative    Lives in [place] - negative except as listed above      Objective:     Vitals:    05/11/21 1411   BP: (!) 146/80   Pulse: 78   Resp: 14   Temp: 98.1 °F (36.7 °C)   SpO2: 97%   Weight: 341 lb (154.7 kg)   Height: 5' 3\" (1.6 m)       Physical Examination: General appearance - alert, well appearing, and in no distress  Eyes - pupils equal and reactive, extraocular eye movements intact  Ears - bilateral TM's and external ear canals normal  Nose - normal and patent, no erythema, discharge or polyps  Mouth - mucous membranes moist, pharynx normal without lesions  Neck - supple, no significant adenopathy  Chest - clear to auscultation, no wheezes, rales or rhonchi, symmetric air entry  Heart - normal rate, regular rhythm, normal S1, S2, no murmurs, rubs, clicks or gallops  Abdomen - soft, nontender, nondistended, no masses or organomegaly  Back exam - full range of motion, no tenderness, palpable spasm or pain on motion  Neurological - alert, oriented, normal speech, no focal findings or movement disorder noted  Musculoskeletal - no joint tenderness, deformity or swelling  Extremities - peripheral pulses normal, no pedal edema, no clubbing or cyanosis  Skin - normal coloration and turgor, no rashes, no suspicious skin lesions noted      Assessment/ Plan:   Diagnoses and all orders for this visit:    1. Well woman exam  -     CBC W/O DIFF; Future  -     LIPID PANEL; Future  -     METABOLIC PANEL, COMPREHENSIVE; Future  -     [facility] MAMMO BI SCREENING INCL CAD; Future    2. Type II diabetes mellitus with complication, uncontrolled (HCC)  -     MICROALBUMIN, UR, RAND W/ MICROALB/CREAT RATIO; Future  -      DIABETES FOOT EXAM  -     HEMOGLOBIN A1C WITH EAG; Future    3. Uncontrolled hypertension    4. Cervical cancer screening  -     REFERRAL TO OBSTETRICS AND GYNECOLOGY    5. Encounter for screening for malignant neoplasm of breast, unspecified screening modality  -     REFERRAL TO OBSTETRICS AND GYNECOLOGY  -     [facility] MAMMO BI SCREENING INCL CAD; Future    6. Need for hepatitis C screening test  -     HEPATITIS C AB; Future    7. Class 3 severe obesity with serious comorbidity and body mass index (BMI) of 60.0 to 69.9 in adult, unspecified obesity type (HCC)  -     H PYLORI AB, IGG, QT; Future  -     H PYLORI AB, IGA; Future           Labs to be drawn: CBC, CMP, Lipid            I have discussed the diagnosis with the patient and the intended plan as seen in the above orders. The patient has received an after-visit summary and questions were answered concerning future plans. Medication Side Effects and Warnings were discussed with patient,  Patient Labs were reviewed and or requested, and  Patient Past Records were reviewed and or requested  yes         Chief Complaint   Patient presents with    Complete Physical     she is a 64y.o. year old female who presents for follow-up of diabetes and hypertension. Diabetes - Pt well controlled on Metformin. is not checking BS at home. denies hypoglycemia symptoms. denies neuropathy symptoms. Last eye exam last year. Last foot exam last year. Questionaire:  Diabetes Report Card   1) Have you seen the eye doctor in past year?no    2) How would you  rate your Diabetic Diet? Good   3) How well do you take care of your feet? Well   4) Do you keep your Primary Care Follow Up Appts? yes    5) Do you know your A1C goal?yes    6) Do you take your medications daily? yes    7) Do you check your blood sugars?no    8) Have you gained weight?yes    9) Have you lost weight?no    10) Do you follow an exercise program?no    11) Can you do better? yes            Lab Results   Component Value Date/Time    Hemoglobin A1c 7.2 (H) 09/06/2019 08:56 AM     Lab Results Component Value Date/Time    Cholesterol, total 175 09/06/2019 08:56 AM    HDL Cholesterol 39 09/06/2019 08:56 AM    LDL, calculated 97.8 09/06/2019 08:56 AM    Triglyceride 191 (H) 09/06/2019 08:56 AM    CHOL/HDL Ratio 4.5 09/06/2019 08:56 AM     No results found for: MCACR, MCA1, MCA2, MCA3, MCAU      Reviewed and agree with Nurse Note and duplicated in this note. Reviewed PmHx, RxHx, FmHx, SocHx, AllgHx and updated and dated in the chart.     Family History   Problem Relation Age of Onset   [facility] Breast Cancer Mother     Heart Attack Father     Hypertension Sister     Hypertension Brother     Cancer Maternal Aunt         \"bone\"       Past Medical History:   Diagnosis Date    Arthritis     Chronic obstructive pulmonary disease ([facility] [address] [facility]     Diabetes ([facility] [address] [facility]     Hypertension     Seizures ([facility] [address] [facility]     Sleep apnea       Social History     Socioeconomic History    Marital status: SINGLE     Spouse name: Not on file    Number of children: Not on file    Years of education: Not on file    Highest education level: Not on file   Occupational History    Occupation: Adm Asst   Tobacco Use    Smoking status: Former Smoker    Smokeless tobacco: Former User     Quit date: 3/17/2019   Substance and Sexual Activity    Alcohol use: Not Currently    Drug use: Not Currently    Sexual activity: Not Currently   Social History Narrative    Lives in [facility] [address] - negative except as listed above      Objective:     Vitals:    05/11/21 1411   BP: (!) 146/80   Pulse: 78   Resp: 14   Temp: 98.1 °F (36.7 °C)   SpO2: 97%   Weight: 341 lb (154.7 kg)   Height: 5' 3\" (1.6 m)       Physical Examination: General appearance - alert, well appearing, and in no distress  Eyes - pupils equal and reactive, extraocular eye movements intact  Ears - bilateral TM's and external ear canals normal  Nose - normal and patent, no erythema, discharge or polyps  Mouth - mucous membranes moist, pharynx normal without lesions  Neck - supple, no significant adenopathy  Chest - clear to auscultation, no wheezes, rales or rhonchi, symmetric air entry  Heart - normal rate, regular rhythm, normal S1, S2, no murmurs, rubs, clicks or gallops  Abdomen - soft, nontender, nondistended, no masses or organomegaly  Neurological - alert, oriented, normal speech, no focal findings or movement disorder noted  Musculoskeletal - no joint tenderness, deformity or swelling  Extremities - peripheral pulses normal, no pedal edema, no clubbing or cyanosis  Skin - normal coloration and turgor, no rashes, no suspicious skin lesions noted     Assessment/ Plan:   Diagnoses and all orders for this visit:    1. Well woman exam  -     CBC W/O DIFF; Future  -     LIPID PANEL; Future  -     METABOLIC PANEL, COMPREHENSIVE; Future  -     [facility] MAMMO BI SCREENING INCL CAD; Future    2. Type II diabetes mellitus with complication, uncontrolled (HCC)  -     MICROALBUMIN, UR, RAND W/ MICROALB/CREAT RATIO; Future  -      DIABETES FOOT EXAM  -     HEMOGLOBIN A1C WITH EAG; Future    3. Uncontrolled hypertension    4. Cervical cancer screening  -     REFERRAL TO OBSTETRICS AND GYNECOLOGY    5. Encounter for screening for malignant neoplasm of breast, unspecified screening modality  -     REFERRAL TO OBSTETRICS AND GYNECOLOGY  -     [facility] MAMMO BI SCREENING INCL CAD; Future    6. Need for hepatitis C screening test  -     HEPATITIS C AB; Future    7. Class 3 severe obesity with serious comorbidity and body mass index (BMI) of 60.0 to 69.9 in adult, unspecified obesity type (HCC)  -     H PYLORI AB, IGG, QT; Future  -     H PYLORI AB, IGA; Future    New patient high risk that is applying for disability. COPD, diabetes and hypertension. Will do blood work to further assess need for change in medications      . I have discussed the diagnosis with the patient and the intended plan as seen in the above orders.   The patient has received an after-visit summary and questions were answered concerning future plans. Medication Side Effects and Warnings were discussed with patient,  Patient Labs were reviewed and or requested, and  Patient Past Records Aiyana have reviewed/discussed the above normal BMI with the patient. I have recommended the following interventions: dietary management education, guidance, and counseling . reviewed and or requested  yes         Pt agrees to call or return to clinic and/or go to closest ER with any worsening of symptoms. This may include, but not limited to increased fever (>100.4) with NSAIDS or Tylenol, increased edema, confusion, rash, worsening of presenting symptoms. Please note that this dictation was completed with [employer], the computer voice recognition software. Quite often unanticipated grammatical, syntax, homophones, and other interpretive errors are inadvertently transcribed by the computer software. Please disregard these errors. Please excuse any errors that have escaped final proofreading. Thank you.

## 2021-05-12 LAB
ALBUMIN SERPL-MCNC: 3.6 G/DL (ref 3.5–5)
ALBUMIN/GLOB SERPL: 1 {RATIO} (ref 1.1–2.2)
ALP SERPL-CCNC: 88 U/L (ref 45–117)
ALT SERPL-CCNC: 28 U/L (ref 12–78)
ANION GAP SERPL CALC-SCNC: 7 MMOL/L (ref 5–15)
AST SERPL-CCNC: 14 U/L (ref 15–37)
BILIRUB SERPL-MCNC: 0.4 MG/DL (ref 0.2–1)
BUN SERPL-MCNC: 17 MG/DL (ref 6–20)
BUN/CREAT SERPL: 17 (ref 12–20)
CALCIUM SERPL-MCNC: 9.1 MG/DL (ref 8.5–10.1)
CHLORIDE SERPL-SCNC: 103 MMOL/L (ref 97–108)
CHOLEST SERPL-MCNC: 188 MG/DL
CO2 SERPL-SCNC: 31 MMOL/L (ref 21–32)
CREAT SERPL-MCNC: 1 MG/DL (ref 0.55–1.02)
CREAT UR-MCNC: 34.8 MG/DL
ERYTHROCYTE [DISTWIDTH] IN BLOOD BY AUTOMATED COUNT: 14.1 % (ref 11.5–14.5)
EST. AVERAGE GLUCOSE BLD GHB EST-MCNC: 174 MG/DL
GLOBULIN SER CALC-MCNC: 3.6 G/DL (ref 2–4)
GLUCOSE SERPL-MCNC: 180 MG/DL (ref 65–100)
HBA1C MFR BLD: 7.7 % (ref 4–5.6)
HCT VFR BLD AUTO: 41.7 % (ref 35–47)
HCV AB SERPL QL IA: NONREACTIVE
HCV COMMENT,HCGAC: NORMAL
HDLC SERPL-MCNC: 43 MG/DL
HDLC SERPL: 4.4 {RATIO} (ref 0–5)
HGB BLD-MCNC: 12.7 G/DL (ref 11.5–16)
LDLC SERPL CALC-MCNC: 111.2 MG/DL (ref 0–100)
LIPID PROFILE,FLP: ABNORMAL
MCH RBC QN AUTO: 30.2 PG (ref 26–34)
MCHC RBC AUTO-ENTMCNC: 30.5 G/DL (ref 30–36.5)
MCV RBC AUTO: 99 FL (ref 80–99)
MICROALBUMIN UR-MCNC: <0.5 MG/DL
MICROALBUMIN/CREAT UR-RTO: NORMAL MG/G (ref 0–30)
NRBC # BLD: 0 K/UL (ref 0–0.01)
NRBC BLD-RTO: 0 PER 100 WBC
PLATELET # BLD AUTO: 275 K/UL (ref 150–400)
PMV BLD AUTO: 11.4 FL (ref 8.9–12.9)
POTASSIUM SERPL-SCNC: 4.8 MMOL/L (ref 3.5–5.1)
PROT SERPL-MCNC: 7.2 G/DL (ref 6.4–8.2)
RBC # BLD AUTO: 4.21 M/UL (ref 3.8–5.2)
SODIUM SERPL-SCNC: 141 MMOL/L (ref 136–145)
TRIGL SERPL-MCNC: 169 MG/DL (ref ?–150)
VLDLC SERPL CALC-MCNC: 33.8 MG/DL
WBC # BLD AUTO: 8.7 K/UL (ref 3.6–11)

## 2021-05-12 RX ORDER — METFORMIN HYDROCHLORIDE 1000 MG/1
1000 TABLET ORAL 2 TIMES DAILY WITH MEALS
Qty: 60 TAB | Refills: 5 | Status: SHIPPED | OUTPATIENT
Start: 2021-05-12

## 2021-05-13 LAB
H PYLORI IGA SER-ACNC: 11.9 UNITS (ref 0–8.9)
H PYLORI IGG SER IA-ACNC: 0.3 INDEX VALUE (ref 0–0.79)

## 2021-06-02 ENCOUNTER — CLINICAL SUPPORT (OUTPATIENT)
Dept: SURGERY | Age: 61
End: 2021-06-02

## 2021-06-02 DIAGNOSIS — E66.01 MORBID OBESITY (HCC): Primary | ICD-10-CM

## 2021-06-02 NOTE — PROGRESS NOTES
[facility]  Supervised Weight Loss     Date:   2021    Patient's Name: Reggie Corral  : 1960    Insurance:  [address] [place] [address]              Session:   Surgery: Sleeve Gastrectomy  Surgeon:  Carlos Reynolds M.D. Height: 63\"   Recent Office Wt:    341      Lbs. BMI: 60   Pounds Lost since last month: 0               Pounds Gained since last month: 3#    Starting Weight: 338#   Previous Months Weight: 338#  Overall Pounds Lost: 0  Overall Pounds Gained: 3#    Other Pertinent Information: Today's appointment was completed over the phone d/t COVID-19. Today's wt was pulled from recent office visit documented in EMR from 2021    Smoking Status:  none  Alcohol Intake: none    I have reviewed with pt the guidelines of the supervised wt loss program.  Pt understands the expectations of some wt loss during the program and that wt gain could delay the process. I have also explained that appointments need to be consecutive and missing an appointment may result in starting over. Pt has received this information in writing. Changes that patient has made since last month include:  Trying protein shakes, portion control, decreased diet soda intake      Eating Habits and Behaviors  A nutrition lesson was presented on label reading with specific guidelines provided for limiting added sugars. This information will help increase healthy food choices, promote weight loss and prevent dumping syndrome after gastric bypass. We also reviewed the general nutrition guidelines for bariatric surgery. Patient's current diet habits include: Pt is eating 2-3 meals per day. Drinking a Premier protein shake for breakfast. Sometimes skips lunch. Pt is eating refined carbohydrate foods (bread, pasta, rice, potatoes) daily. Pt is using baked, grilled, broiled, fried cooking methods. Pt is eating meals prepared outside of the home once per week.  Pt is drinking more water and less diet soda. Physical Activity/Exercise  We talked about the importance of increasing daily physical activity and beginning to develop an exercise regimen/routine. We talked about exercise as being an important part of long term weight loss after surgery. Comments:  During class, I discussed with patient the importance of getting into an exercise routine. Pt is currently not exercising d/t back pain. Pt has been encouraged to use exercise bike or chair exercises at home. Behavior Modification       We talked about how to eat more mindfully. Tips and recommendations for how to make these changes were provided. Pt was encouraged to keep a food journal and record what they were taking in daily. Overall Assessment: Pt demonstrates small lifestyle changes evidenced by reported changes. Will continue to assess. Patient-Set Goals:   1. Nutrition - continue to work on 3 meals a day   2. Exercise - review chair exercise videos and try exercise bike in short segments if tolerated   3.  Behavior -review protein lists and practice eating protein first     Twan Campos RD  6/2/2021

## 2021-06-08 ENCOUNTER — HOSPITAL ENCOUNTER (OUTPATIENT)
Dept: MAMMOGRAPHY | Age: 61
Discharge: HOME | End: 2021-06-08
Attending: FAMILY MEDICINE
Payer: COMMERCIAL

## 2021-06-08 DIAGNOSIS — Z12.39 ENCOUNTER FOR SCREENING FOR MALIGNANT NEOPLASM OF BREAST, UNSPECIFIED SCREENING MODALITY: ICD-10-CM

## 2021-06-08 DIAGNOSIS — Z01.419 WELL WOMAN EXAM: ICD-10-CM

## 2021-06-08 PROCEDURE — 77067 SCR MAMMO BI INCL CAD: CPT

## 2021-06-10 ENCOUNTER — OFFICE VISIT (OUTPATIENT)
Dept: OBGYN CLINIC | Age: 61
End: 2021-06-10
Payer: COMMERCIAL

## 2021-06-10 VITALS
BODY MASS INDEX: 51.91 KG/M2 | WEIGHT: 293 LBS | DIASTOLIC BLOOD PRESSURE: 94 MMHG | HEIGHT: 63 IN | SYSTOLIC BLOOD PRESSURE: 146 MMHG

## 2021-06-10 DIAGNOSIS — Z11.51 SCREENING FOR HUMAN PAPILLOMAVIRUS: ICD-10-CM

## 2021-06-10 DIAGNOSIS — Z01.419 WELL WOMAN EXAM: Primary | ICD-10-CM

## 2021-06-10 DIAGNOSIS — Z11.3 SCREENING FOR VENEREAL DISEASE: ICD-10-CM

## 2021-06-10 PROCEDURE — 99386 PREV VISIT NEW AGE 40-64: CPT | Performed by: OBSTETRICS & GYNECOLOGY

## 2021-06-10 NOTE — PROGRESS NOTES
Annual exam    Kylah Sanchez is a 64 y.o. presenting for annual exam.     She has not had a pap smear in > 12 years. Her main concerns today include painful intercourse and small amount of bleeding after sex. Recently with new sexual partner and requests full STI testing. Mom with breast cancer in her 45s. PMH complicated by severe COPD (on O2, and going on disability), DM, HTN, CORWIN, seizure, arthritis, and morbid obesity (BMI 60). She is losing insurance tomorrow. Ob/Gyn Hx:  G0   Menopause- age 66  ? PMB-denies  ? VMS-present  ? Vag dryness-present  ? HRT-denies  STI- denies  ? SA-yes    Health maintenance:  Pap-unsure  Mammo-6/8/21 B1  Colonoscopy- denies    Past Medical History:   Diagnosis Date    Arthritis     [facility] disease [facility]     Diabetes ([facility]     Hypertension     Seizures ([facility]     Sleep apnea        Past Surgical History:   Procedure Laterality Date    HX DILATION AND CURETTAGE      HX DILATION AND CURETTAGE         Family History   Problem Relation Age of Onset   Bharti Aguilar Breast Cancer Mother         42's    Heart Attack Father     Hypertension Sister     Hypertension Brother     Cancer Maternal Aunt         \"bone\"       Social History     Socioeconomic History    Marital status: SINGLE     Spouse name: Not on file    Number of children: Not on file    Years of education: Not on file    Highest education level: Not on file   Occupational History    Occupation: Adm Asst   Tobacco Use    Smoking status: Former Smoker    Smokeless tobacco: Former User     Quit date: 3/17/2019   Substance and Sexual Activity    Alcohol use: Not Currently    Drug use: Not Currently    Sexual activity: Yes     Partners: Male   Other Topics Concern    Not on file   Social History Narrative    Lives in [address] [place] [address] Determinants of Health     Financial Resource Strain:     Difficulty of Paying Living Expenses:    Food Insecurity:     Worried About Running Out of Food in the Last Year:    [address] in the Last Year:    Transportation Needs:     Lack of Transportation (Medical):  Lack of Transportation (Non-Medical):    Physical Activity:     Days of Exercise per Week:     Minutes of Exercise per Session:    Stress:     Feeling of Stress :    Social Connections:     Frequency of Communication with Friends and Family:     Frequency of Social Gatherings with Friends and Family:     Attends Sabianism Services:     Active Member of Clubs or Organizations:     Attends Club or Organization Meetings:     Marital Status:    Intimate Partner Violence:     Fear of Current or Ex-Partner:     Emotionally Abused:     Physically Abused:     Sexually Abused:        Current Outpatient Medications   Medication Sig Dispense Refill    metFORMIN (GLUCOPHAGE) 1,000 mg tablet Take 1 Tab by mouth two (2) times daily (with meals). 60 Tab 5    multivitamin (ONE A DAY) tablet Take 1 Tab by mouth daily.  Oxygen 2 liters via NC continuously.  carvediloL (COREG) 12.5 mg tablet Take 1 Tab by mouth two (2) times a day. Indications: high blood pressure 180 Tab 3    losartan (COZAAR) 25 mg tablet Take 1 Tab by mouth daily. 90 Tab 3    torsemide (DEMADEX) 20 mg tablet Take 1 Tab by mouth two (2) times a day. 180 Tab 3    levETIRAcetam (KEPPRA) 500 mg tablet Take 1 tablet by mouth twice daily 180 Tab 3    busPIRone (BUSPAR) 10 mg tablet TAKE 1/2 TABLETS BY MOUTH TO 2 THREE TIMES DAILY AS NEEDED FOR ANXIETY 30 Tab 0    albuterol (PROVENTIL VENTOLIN) 2.5 mg /3 mL (0.083 %) nebu daily as needed.  aspirin 81 mg chewable tablet Take 1 Tab by mouth daily. 30 Tab 0    naproxen (NAPROSYN) 375 mg tablet Take 375 mg by mouth two (2) times daily (with meals).  umeclidinium-vilanterol (ANORO ELLIPTA) 62.5-25 mcg/actuation inhaler Take 1 Puff by inhalation daily.  fluticasone propionate (FLOVENT DISKUS) 250 mcg/actuation dsdv Take 250 mcg by inhalation two (2) times a day.  albuterol (PROVENTIL HFA, VENTOLIN HFA, PROAIR HFA) 90 mcg/actuation inhaler Take 1 Puff by inhalation every six (6) hours as needed for Wheezing.  acetaminophen (TylenoL) 325 mg tablet Take 975 mg by mouth every four (4) hours as needed for Pain. (Patient not taking: Reported on 6/10/2021)      acetaminophen/diphenhydramine (TYLENOL PM PO) Take  by mouth as needed. (Patient not taking: Reported on 6/10/2021)      metFORMIN (GLUCOPHAGE) 500 mg tablet Take 1 Tab by mouth two (2) times daily (with meals).  (Patient not taking: Reported on 6/10/2021) 180 Tab 0       Allergies   Allergen Reactions    Pcn [Penicillins] Unknown (comments)       Review of Systems - History obtained from the patient  Constitutional: negative for weight loss, fever, night sweats  HEENT: negative for hearing loss, earache, congestion, snoring, sorethroat  CV: negative for chest pain, palpitations, edema  Resp: negative for cough, shortness of breath, wheezing  GI: negative for change in bowel habits, abdominal pain, black or bloody stools  : negative for frequency, dysuria, hematuria, vaginal discharge, +dyspareunia  MSK: negative for back pain, joint pain, muscle pain  Breast: negative for breast lumps, nipple discharge, galactorrhea  Skin :negative for itching, rash, hives  Neuro: negative for dizziness, headache, confusion, weakness  Psych: negative for anxiety, depression, change in mood  Heme/lymph: negative for bleeding, bruising, pallor    Physical Exam  Visit Vitals  BP (!) 146/94   Ht 5' 3\" (1.6 m)   Wt 338 lb (153.3 kg)   BMI 59.87 kg/m²     Constitutional  · Appearance: well-nourished, well developed, alert, in no acute distress, +morbid obesity    HENT  · Head and Face: appears normal    Neck  · Inspection/Palpation: normal appearance, no masses or tenderness  · Lymph Nodes: no lymphadenopathy present  · Thyroid: gland size normal, nontender, no nodules or masses present on palpation    Chest  · Respiratory Effort: non-labored breathing  · Auscultation: CTAB, normal breath sounds    Cardiovascular  · Heart:  · Auscultation: regular rate and rhythm without murmur  · Extremities: no peripheral edema    Breasts  · Inspection of Breasts: breasts symmetrical, no skin changes, no discharge present, nipple appearance normal, no skin retraction present  · Palpation of Breasts and Axillae: no masses present on palpation, no breast tenderness  · Axillary Lymph Nodes: no lymphadenopathy present    Gastrointestinal  · Abdominal Examination: abdomen non-tender to palpation, normal bowel sounds, no masses present, +moderately distended vs. obese  · Liver and spleen: no hepatomegaly present, spleen not palpable  · Hernias: no hernias identified    Genitourinary  · External Genitalia: normal appearance for age, no discharge present, no tenderness present, no inflammatory lesions present, no masses present, +atrophy of UG mucosa  · Vagina: normal vaginal vault without central or paravaginal defects, no discharge present, no inflammatory lesions present, no masses present  · Bladder: non-tender to palpation  · Urethra: appears normal  · Cervix: normal, though limited visualization due to stenotic proximal vagina  · Uterus: normal size, shape and consistency, small, mobile  · Adnexa: no adnexal tenderness present, no adnexal masses present  · Perineum: perineum within normal limits, no evidence of trauma, no rashes or skin lesions present    Skin  · General Inspection: no rash, no lesions identified, diffuse edema bilat lower extremities    Neurologic/Psychiatric  · Mental Status:  · Orientation: grossly oriented to person, place and time  · Mood and Affect: mood normal, affect appropriate      Assessment/Plan:  64 y.o. postmenopausal female presenting for annual exam. Overall doing well. Morbid obesity.  +dyspareunia, and mild vaginal stenosis    Health Maintenance:  -counseled re: diet, exercise, healthy lifestyle, wt loss  -pap/HPV today  -STI testing (serum and endocervix)  -mammogram utd  -refer for colonoscopy  -informed pt about care card as she is losing insurance  -may use non-hormonal lubricants/moisturizers for vagina dryness and dyspareunia  -offer genetic testing for hereditary breast cancer at visit net year  -advise f/u wit PCP re: elevated BP    RTC: 1 year for AE or sooner prn    Jose Gibson MD  6/10/2021  5:01 PM

## 2021-06-10 NOTE — PATIENT INSTRUCTIONS
Painful Sex: Care Instructions Your Care Instructions Painful sex can be caused by many things. You may have an injury, an infection, or a growth in your vagina. Or maybe you have muscle spasms. In some cases, the pain is caused by another medical condition, such as a spinal problem. Some medicines can cause dryness in the vagina. And as a woman gets older, her vagina gets drier. It may also narrow, shorten, and get stiffer. This dryness can make sex painful. Talk to your doctor about what might be causing your painful sex. Treatment may help. Follow-up care is a key part of your treatment and safety. Be sure to make and go to all appointments, and call your doctor if you are having problems. It's also a good idea to know your test results and keep a list of the medicines you take. How can you care for yourself at home? · Use a vaginal lubricant during sex. Examples are Astroglide, K-Y Jelly, and Wet Gel Lubricant. · Increase the time you and your partner spend touching each other before sex. This is called foreplay. · Try different positions for sex to find the most comfortable ones. · Ask your doctor about exercises to strengthen and relax your pelvic muscles. · Before sex, take a warm bath. This can relax you and reduce anxiety. · If your doctor prescribes any medicines, take them exactly as prescribed. Call your doctor if you think you are having a problem with your medicine. When should you call for help? Watch closely for changes in your health, and be sure to contact your doctor if you have any problems. Where can you learn more? Go to http://www.gray.com/ Enter E037 in the search box to learn more about \"Painful Sex: Care Instructions. \" Current as of: July 17, 2020               Content Version: 12.8 © 7254-0278 [employer].   
Care instructions adapted under license by [employer] (which disclaims liability or warranty for this information). If you have questions about a medical condition or this instruction, always ask your healthcare professional. [address] any warranty or liability for your use of this information. Pelvic Exam: Care Instructions Overview When your doctor examines your pelvic organs, it's called a pelvic exam. This exam is done to evaluate symptoms, such as pelvic pain or abnormal vaginal bleeding and discharge. It may also be done to collect samples of cells for cervical cancer screening. Before your exam, it's important to share some information with your doctor. You can talk about any concerns you may have. Your doctor will also want to know if you are pregnant or use birth control. And your doctor will want to hear about any problems, surgeries, or procedures you have had in your pelvic area. You will also need to tell your doctor when your last period was. Follow-up care is a key part of your treatment and safety. Be sure to make and go to all appointments, and call your doctor if you are having problems. It's also a good idea to know your test results and keep a list of the medicines you take. How is a pelvic exam done? · During a pelvic exam, you will: ? Take off your clothes below the waist. You will get a paper or cloth cover to put over the lower half of your body. ? Lie on your back on an exam table with your feet and legs supported by footrests. · The doctor may: ? Ask you to relax your knees. Your knees need to lean out, toward the walls. ? Check the opening of your vagina for sores or swelling. ? Gently put a tool called a speculum into your vagina. It opens the vagina a little bit. You may feel some pressure. The speculum lets your doctor see inside the vagina. ? Use a small brush, spatula, or swab to get a sample for testing. The doctor then removes the speculum. ? Put on gloves and put one or two fingers of one hand into your vagina.  The other hand goes on your lower belly. This lets your doctor feel your pelvic organs. You will probably feel some pressure. ? Put one gloved finger into your rectum and one into your vagina, if needed. This can also help check your pelvic organs. You may have a small amount of vaginal discharge or bleeding after the exam. 
Why is a pelvic exam done? A pelvic exam may be done: · To collect samples of cells for cervical cancer screening. · To check for vaginal infection. · To check for sexually transmitted infections, such as chlamydia or herpes. · To help find the cause of abnormal uterine bleeding. · To look for problems like uterine fibroids, ovarian cysts, or uterine prolapse. · To help find the cause of pelvic or belly pain. · Before inserting an intrauterine device (IUD). · To collect evidence if you've been sexually assaulted. What are the risks of a pelvic exam? 
There is a small chance that the doctor will find something on a pelvic exam that would not have caused a problem. This is called overdiagnosis. It could lead to tests or treatment you don't need. When should you call for help? Watch closely for changes in your health, and be sure to contact your doctor if you have any problems. Where can you learn more? Go to http://www.mon.ki.com/ Enter L660 in the search box to learn more about \"Pelvic Exam: Care Instructions. \" Current as of: July 17, 2020               Content Version: 12.8 © 1162-6599 [employer]. Care instructions adapted under license by [employer] (which disclaims liability or warranty for this information). If you have questions about a medical condition or this instruction, always ask your healthcare professional. [place] [address] any warranty or liability for your use of this information.

## 2021-06-11 LAB
HBV SURFACE AG SER QL: <0.1 INDEX
HBV SURFACE AG SER QL: NEGATIVE
HCV AB SERPL QL IA: NONREACTIVE
HCV COMMENT,HCGAC: NORMAL
HIV 1+2 AB+HIV1 P24 AG SERPL QL IA: NONREACTIVE
HIV12 RESULT COMMENT, HHIVC: NORMAL
RPR SER QL: NONREACTIVE

## 2021-06-15 LAB
C TRACH RRNA CVX QL NAA+PROBE: NEGATIVE
CYTOLOGIST CVX/VAG CYTO: ABNORMAL
CYTOLOGY CVX/VAG DOC CYTO: ABNORMAL
CYTOLOGY CVX/VAG DOC THIN PREP: ABNORMAL
DX ICD CODE: ABNORMAL
HPV I/H RISK 4 DNA CVX QL PROBE+SIG AMP: NEGATIVE
Lab: ABNORMAL
Lab: ABNORMAL
N GONORRHOEA RRNA CVX QL NAA+PROBE: NEGATIVE
OTHER STN SPEC: ABNORMAL
STAT OF ADQ CVX/VAG CYTO-IMP: ABNORMAL
T VAGINALIS RRNA SPEC QL NAA+PROBE: POSITIVE

## 2021-06-15 NOTE — PROGRESS NOTES
Pap NILM HPV neg, repeat cotesting in 3 years    +Trichomonas. Please notify patient and send Rx for Flagyl 2g PO x 1 dose to pt's pharmacy. Please advise partner treatment, and no intercourse until 2 weeks after all partners treated. Please review safe sexual practices. Thanks!     GC and Chl neg

## 2021-06-16 RX ORDER — METRONIDAZOLE 500 MG/1
2000 TABLET ORAL
Qty: 4 TABLET | Refills: 0 | Status: SHIPPED | OUTPATIENT
Start: 2021-06-16 | End: 2021-06-16

## 2021-07-01 ENCOUNTER — CLINICAL SUPPORT (OUTPATIENT)
Dept: SURGERY | Age: 61
End: 2021-07-01

## 2021-07-01 DIAGNOSIS — E66.01 MORBID OBESITY (HCC): Primary | ICD-10-CM

## 2021-07-01 NOTE — PROGRESS NOTES
[facility] at [facility]  Supervised Weight Loss     Date:   2021    Patient's Name: Phylicia Vega  : 1960    Insurance:  [facility]                                          Session: 3 of  12  Surgery: Sleeve Gastrectomy                     Surgeon:  Isa Rivera M.D.      Height: 63\"                 Recent Office Wt:    338      Lbs. BMI: 59             Pounds Lost since last month: 3#               Pounds Gained since last month: 0#     Starting Weight: 338#                       Previous Months Weight: 341#  Overall Pounds Lost: 0                    Overall Pounds Gained: 3#     Other Pertinent Information: Today's appointment was completed over the phone d/t COVID-19. Today's wt was pulled from recent office visit documented in EMR from 6/10/21. Pt is currently waiting for new insurance d/t losing her job. Smoking Status:  none  Alcohol Intake: none    I have reviewed with pt the guidelines of the supervised wt loss program.  Pt understands the expectations of some wt loss during the program and that wt gain could delay the process. I have also explained that classes need to be consecutive. Missing a class may result in starting over. Pt has received this information in writing. Changes that patient has made since last month include:  Eating 3 meals a day. Eating Habits and Behaviors  General healthy eating guidelines were discussed. A nutrition lesson specific to the importance of protein intake after surgery was provided. We discussed food sources of protein, protein supplements and multiple reasons as to why protein is important after bariatric surgery. Pts were instructed to focus on including protein at every meal and practice eating protein first at the meal. Pts were encouraged to sample a protein shake for tolerance.  Patients were also instructed to use the balanced plate method for help with portion control and general healthy eating prior to surgery. We discussed measuring meals to 1/2 cup total per meal after surgery. Drinking only calorie-free, sugar-free and non-carbonated beverages. We discussed the importance of drinking 64 ounces of fluid per day to prevent dehydration post-operatively. Patient's current diet habits include: Pt is eating 3 meals per day. Breakfast is either an egg and toast or Protein Shake. Lunch is cottage cheese/yogurt or sandwich. Tarri Lyons is fish/chicken, vegetables, rice. Snack choices include yogurt or pretzels. Pt is eating refined carbohydrate foods (bread, pasta, rice, potatoes) 1-2 times per week. Pt is eating sweets/desserts minimal. Pt is using baked, grilled, broiled cooking methods. . Pt is drinking coffee, water, less soda. Physical Activity/Exercise  We talked about the importance of increasing daily physical activity and beginning to develop an exercise regimen/routine. We talked about exercise as being an important part of long term weight loss after surgery. Comments:  During class, I discussed with patient the importance of getting into an exercise routine. Pt is currently increased daily movement as much as possible. Pt has been encouraged to maintain and increase as tolerated. Behavior Modification       We talked about how to eat more mindfully. Tips and recommendations for how to make these changes were provided. Pt was encouraged to keep a food journal and record what they were taking in daily. Overall Assessment: Pt demonstrates appropriate lifestyle changes evidenced by reported changes and reported wt loss. Will continue to assess. Patient-Set Goals:   1. Nutrition - continue to decrease soda intake   2. Exercise - maintain daily activity and bike as tolerated.    3. Behavior -review vitamin information     Kinza Davis, TARAH  7/1/2021

## 2021-08-03 PROBLEM — I10 ESSENTIAL HYPERTENSION: Status: RESOLVED | Noted: 2019-09-05 | Resolved: 2021-08-03

## 2021-08-22 NOTE — PROGRESS NOTES
Last 3 Recorded Weights in this Encounter    09/07/19 0926 09/09/19 0000 09/10/19 0400   Weight: 150 kg (330 lb 11 oz) 150.6 kg (332 lb 1.6 oz) 151.8 kg (334 lb 10.5 oz)       Patient's weight is based on a standing scale/ English

## 2022-03-18 PROBLEM — Z99.89 OSA ON CPAP: Status: ACTIVE | Noted: 2019-09-18

## 2022-03-18 PROBLEM — G47.33 OSA ON CPAP: Status: ACTIVE | Noted: 2019-09-18

## 2022-03-18 PROBLEM — I47.20 VT (VENTRICULAR TACHYCARDIA) (HCC): Status: ACTIVE | Noted: 2019-09-05

## 2022-03-19 PROBLEM — G47.33 OSA AND COPD OVERLAP SYNDROME (HCC): Status: ACTIVE | Noted: 2019-09-18

## 2022-03-19 PROBLEM — R79.89 ELEVATED SERUM CREATININE: Status: ACTIVE | Noted: 2019-09-05

## 2022-03-19 PROBLEM — I10 ELEVATED BLOOD PRESSURE READING IN OFFICE WITH DIAGNOSIS OF HYPERTENSION: Status: ACTIVE | Noted: 2019-09-18

## 2022-03-19 PROBLEM — J44.9 CHRONIC OBSTRUCTIVE PULMONARY DISEASE (HCC): Status: ACTIVE | Noted: 2019-09-18

## 2022-03-19 PROBLEM — J44.9 OSA AND COPD OVERLAP SYNDROME (HCC): Status: ACTIVE | Noted: 2019-09-18

## 2022-03-19 PROBLEM — E66.01 OBESITY, MORBID (HCC): Status: ACTIVE | Noted: 2019-09-18

## 2022-03-19 PROBLEM — J96.21 ACUTE ON CHRONIC RESPIRATORY FAILURE WITH HYPOXIA AND HYPERCAPNIA (HCC): Status: ACTIVE | Noted: 2019-09-18

## 2022-03-19 PROBLEM — R56.9 SEIZURE (HCC): Status: ACTIVE | Noted: 2019-09-05

## 2022-03-19 PROBLEM — R60.9 EDEMA: Status: ACTIVE | Noted: 2019-09-18

## 2022-03-19 PROBLEM — J96.22 ACUTE ON CHRONIC RESPIRATORY FAILURE WITH HYPOXIA AND HYPERCAPNIA (HCC): Status: ACTIVE | Noted: 2019-09-18

## 2022-03-19 PROBLEM — R41.82 ALTERED MENTAL STATUS: Status: ACTIVE | Noted: 2019-09-05

## 2022-03-19 PROBLEM — R53.1 LEFT-SIDED WEAKNESS: Status: ACTIVE | Noted: 2019-09-05

## 2022-03-20 PROBLEM — M54.50 CHRONIC BILATERAL LOW BACK PAIN: Status: ACTIVE | Noted: 2019-09-18

## 2022-03-20 PROBLEM — G89.29 CHRONIC BILATERAL LOW BACK PAIN: Status: ACTIVE | Noted: 2019-09-18

## 2022-05-28 DIAGNOSIS — I47.20 VT (VENTRICULAR TACHYCARDIA): Primary | ICD-10-CM

## 2022-05-28 DIAGNOSIS — J44.9 CHRONIC OBSTRUCTIVE PULMONARY DISEASE, UNSPECIFIED COPD TYPE (HCC): ICD-10-CM

## 2022-05-31 RX ORDER — CARVEDILOL 12.5 MG/1
TABLET ORAL
Qty: 60 TABLET | Refills: 0 | Status: SHIPPED | OUTPATIENT
Start: 2022-05-31

## 2022-05-31 RX ORDER — LOSARTAN POTASSIUM 25 MG/1
TABLET ORAL
Qty: 30 TABLET | Refills: 0 | Status: SHIPPED | OUTPATIENT
Start: 2022-05-31 | End: 2022-07-01 | Stop reason: SDUPTHER

## 2022-05-31 RX ORDER — TORSEMIDE 20 MG/1
TABLET ORAL
Qty: 60 TABLET | Refills: 0 | Status: SHIPPED | OUTPATIENT
Start: 2022-05-31 | End: 2022-07-01 | Stop reason: SDUPTHER

## 2022-06-14 RX ORDER — LEVETIRACETAM 500 MG/1
TABLET ORAL
Qty: 180 TABLET | Refills: 3 | OUTPATIENT
Start: 2022-06-14

## 2022-07-01 DIAGNOSIS — J44.9 CHRONIC OBSTRUCTIVE PULMONARY DISEASE, UNSPECIFIED COPD TYPE (HCC): ICD-10-CM

## 2022-07-01 DIAGNOSIS — I47.20 VT (VENTRICULAR TACHYCARDIA): ICD-10-CM

## 2022-07-01 RX ORDER — TORSEMIDE 20 MG/1
20 TABLET ORAL 2 TIMES DAILY
Qty: 270 TABLET | Refills: 1 | Status: SHIPPED | OUTPATIENT
Start: 2022-07-01

## 2022-07-01 RX ORDER — LOSARTAN POTASSIUM 25 MG/1
25 TABLET ORAL DAILY
Qty: 90 TABLET | Refills: 2 | Status: SHIPPED | OUTPATIENT
Start: 2022-07-01

## 2022-07-01 NOTE — TELEPHONE ENCOUNTER
Refilled per VO per MD    Future Appointments   Date Time Provider Jose Valentino   7/8/2022  3:40 PM MD MIGUEL ANGEL [facility]

## 2022-11-18 ENCOUNTER — TELEPHONE (OUTPATIENT)
Dept: CARDIOLOGY CLINIC | Age: 62
End: 2022-11-18

## 2022-11-18 DIAGNOSIS — I47.20 VT (VENTRICULAR TACHYCARDIA): ICD-10-CM

## 2022-11-18 DIAGNOSIS — J44.9 CHRONIC OBSTRUCTIVE PULMONARY DISEASE, UNSPECIFIED COPD TYPE (HCC): ICD-10-CM

## 2022-11-18 RX ORDER — LOSARTAN POTASSIUM 25 MG/1
25 TABLET ORAL DAILY
Qty: 30 TABLET | Refills: 0 | Status: SHIPPED | OUTPATIENT
Start: 2022-11-18

## 2022-11-18 RX ORDER — CARVEDILOL 12.5 MG/1
TABLET ORAL
Qty: 60 TABLET | Refills: 0 | Status: SHIPPED | OUTPATIENT
Start: 2022-11-18

## 2022-11-18 RX ORDER — TORSEMIDE 20 MG/1
20 TABLET ORAL 2 TIMES DAILY
Qty: 60 TABLET | Refills: 0 | Status: SHIPPED | OUTPATIENT
Start: 2022-11-18

## 2022-11-18 NOTE — TELEPHONE ENCOUNTER
Verified patient using two identifiers. Called and advised patient she needs follow up for further refills. Scheduled for appt. Patient unavailable until December 5th. Advised I would see if we could fill prescriptions to get her through to her appt. Patient verbalized understanding, no further questions.     Future Appointments   Date Time Provider Jose Valentino   12/5/2022  2:20 PM MD MIGUEL ANGEL [facility]

## 2022-11-18 NOTE — TELEPHONE ENCOUNTER
Pt is out of medication and cant get refill and has to schedule an appointment with , but he is booked until 1/23/22.  Pt would like to speak with the /nurse    carvediloL (COREG) 12.5 mg tablet  losartan (COZAAR) 25 mg tablet  torsemide (DEMADEX) 20 mg tablet      Pt #  [phone]

## 2022-11-21 RX ORDER — CARVEDILOL 12.5 MG/1
TABLET ORAL
Qty: 60 TABLET | Refills: 0 | OUTPATIENT
Start: 2022-11-21

## 2023-02-15 ENCOUNTER — OFFICE VISIT (OUTPATIENT)
Dept: CARDIOLOGY CLINIC | Age: 63
End: 2023-02-15

## 2023-02-15 VITALS
HEIGHT: 63 IN | WEIGHT: 293 LBS | DIASTOLIC BLOOD PRESSURE: 86 MMHG | BODY MASS INDEX: 51.91 KG/M2 | RESPIRATION RATE: 16 BRPM | OXYGEN SATURATION: 97 % | SYSTOLIC BLOOD PRESSURE: 138 MMHG | HEART RATE: 80 BPM

## 2023-02-15 DIAGNOSIS — R60.0 BILATERAL LOWER EXTREMITY EDEMA: Primary | ICD-10-CM

## 2023-02-15 DIAGNOSIS — R06.02 SHORT OF BREATH ON EXERTION: ICD-10-CM

## 2023-02-15 DIAGNOSIS — R00.2 HEART PALPITATIONS: ICD-10-CM

## 2023-02-15 NOTE — PROGRESS NOTES
[place]: Suk[employer] Agent  [phone]     History of Present Illness:  Ms. Milla Caceres is 62 yo F pt followed by Dr. Jessica Monet, history of ventricular tachycardia, subsequent cardiac catheterization in 2019 demonstrated no significant CAD and echocardiogram was normal, COPD, morbid obesity. She is here now due to palpitations. They only occur once or twice a month lasting less than a minute. She usually takes a deep breath and it resolves. No exertional chest pain. She does have baseline shortness of breath with exertion and oxygen dependent COPD for this. At one point, she was thinking about doing weight loss surgery, but says she is now unemployed and is on Medicaid now. She is compensated on exam with clear lungs. She does have chronic lower extremity edema. Her EKG today was normal sinus rhythm. There was low voltage, nonspecific ST-T wave abnormality and this is unchanged from her prior EKG. Assessment and Plan:   1. Palpitations. These are brief and she does have a history of ventricular tachycardia. Cardiac catheterization in 2019, as well as echocardiogram were normal.  She is already on a beta blocker. Will obtain an echocardiogram for further evaluation. If this is still normal, no additional cardiac evaluation is indicated. 2. Lower extremity edema. Will obtain an ultrasound of her legs and make sure there is no DVT. Otherwise, this would be most consistent with lymphedema/venous insufficiency. 3. Obesity. Working on diet and exercise. 4. COPD. Followed by pulmonary. 5. Essential hypertension. Blood pressure is controlled. She  has a past medical history of Arthritis, Chronic obstructive pulmonary disease ([address] Diabetes ([address] Hypertension, Seizures ([address] and Sleep apnea. All other systems negative except as above.      PE  Vitals:    02/15/23 1449   BP: 138/86   Pulse: 80   Resp: 16   SpO2: 97%   Weight: 343 lb (155.6 kg)   Height: 5' 3\" (1.6 m)      Body mass index is 60.76 kg/m². General appearance - alert, obese, and in no distress  Mental status - affect appropriate to mood  Eyes - sclera anicteric, moist mucous membranes  Neck - supple, no JVD  Chest - clear to auscultation, no wheezes, rales or rhonchi  Heart - normal rate, regular rhythm, normal S1, S2, no murmurs, rubs, clicks or gallops  Abdomen - soft, nontender, nondistended, no masses or organomegaly  Extremities - peripheral pulses normal,trace pedal edema    Recent Labs:  Lab Results   Component Value Date/Time    Cholesterol, total 188 05/11/2021 01:44 AM    HDL Cholesterol 43 05/11/2021 01:44 AM    LDL, calculated 111.2 (H) 05/11/2021 01:44 AM    Triglyceride 169 (H) 05/11/2021 01:44 AM    CHOL/HDL Ratio 4.4 05/11/2021 01:44 AM     Lab Results   Component Value Date/Time    Creatinine 1.00 05/11/2021 01:44 AM     Lab Results   Component Value Date/Time    BUN 17 05/11/2021 01:44 AM     Lab Results   Component Value Date/Time    Potassium 4.8 05/11/2021 01:44 AM     Lab Results   Component Value Date/Time    Hemoglobin A1c 7.7 (H) 05/11/2021 01:44 AM     Lab Results   Component Value Date/Time    HGB 12.7 05/11/2021 01:44 AM     Lab Results   Component Value Date/Time    PLATELET 547 65/02/5813 01:44 AM       Reviewed:  Past Medical History:   Diagnosis Date    Arthritis     Chronic obstructive pulmonary disease (HCC)     Diabetes ([facility]     Hypertension     Seizures ([facility]     Sleep apnea      Social History     Tobacco Use   Smoking Status Former   Smokeless Tobacco Former    Quit date: 3/17/2019     Social History     Substance and Sexual Activity   Alcohol Use Not Currently     Allergies   Allergen Reactions    Pcn [Penicillins] Unknown (comments)       Current Outpatient Medications   Medication Sig    carvediloL (COREG) 12.5 mg tablet TAKE 1 TABLET BY MOUTH TWICE DAILY FOR HIGH BLOOD PRESSURE    losartan (COZAAR) 25 mg tablet Take 1 Tablet by mouth daily.     torsemide (DEMADEX) 20 mg tablet Take 1 Tablet by mouth two (2) times a day. Oxygen 2 liters via NC continuously. albuterol (PROVENTIL VENTOLIN) 2.5 mg /3 mL (0.083 %) nebu daily as needed. aspirin 81 mg chewable tablet Take 1 Tab by mouth daily. naproxen (NAPROSYN) 375 mg tablet Take 375 mg by mouth two (2) times daily (with meals). umeclidinium-vilanteroL (ANORO ELLIPTA) 62.5-25 mcg/actuation inhaler Take 1 Puff by inhalation daily. fluticasone propionate 250 mcg/actuation dsdv Take 250 mcg by inhalation two (2) times a day. albuterol (PROVENTIL HFA, VENTOLIN HFA, PROAIR HFA) 90 mcg/actuation inhaler Take 1 Puff by inhalation every six (6) hours as needed for Wheezing. metFORMIN (GLUCOPHAGE) 1,000 mg tablet Take 1 Tab by mouth two (2) times daily (with meals). (Patient not taking: Reported on 2/15/2023)    multivitamin (ONE A DAY) tablet Take 1 Tab by mouth daily. (Patient not taking: Reported on 2/15/2023)    acetaminophen (TYLENOL) 325 mg tablet Take 975 mg by mouth every four (4) hours as needed for Pain. (Patient not taking: No sig reported)    acetaminophen/diphenhydramine (TYLENOL PM PO) Take  by mouth as needed. (Patient not taking: No sig reported)    levETIRAcetam (KEPPRA) 500 mg tablet Take 1 tablet by mouth twice daily (Patient not taking: Reported on 2/15/2023)    busPIRone (BUSPAR) 10 mg tablet TAKE 1/2 TABLETS BY MOUTH TO 2 THREE TIMES DAILY AS NEEDED FOR ANXIETY (Patient not taking: Reported on 2/15/2023)     No current facility-administered medications for this visit.        MD Barney Hancockformerly [facility] [place]  [address], [address] [facility] [address]  [address]

## 2023-02-15 NOTE — LETTER
Patient:  Daniella Tejada   YOB: 1960  Date of Visit: 2/15/2023      Dear Sania Harvey MD  [address] [facility] [address]  Via In Basket:      Ms. Cam Mckoy is 64 yo F pt followed by Dr. Mary Carmen Koo, history of ventricular tachycardia, subsequent cardiac catheterization in 2019 demonstrated no significant CAD and echocardiogram was normal, COPD, morbid obesity. She is here now due to palpitations. They only occur once or twice a month lasting less than a minute. She usually takes a deep breath and it resolves. No exertional chest pain. She does have baseline shortness of breath with exertion and oxygen dependent COPD for this. At one point, she was thinking about doing weight loss surgery, but says she is now unemployed and is on Medicaid now. She is compensated on exam with clear lungs. She does have chronic lower extremity edema. Her EKG today was normal sinus rhythm. There was low voltage, nonspecific ST-T wave abnormality and this is unchanged from her prior EKG. Assessment and Plan:   1. Palpitations. These are brief and she does have a history of ventricular tachycardia. Cardiac catheterization in 2019, as well as echocardiogram were normal.  She is already on a beta blocker. Will obtain an echocardiogram for further evaluation. If this is still normal, no additional cardiac evaluation is indicated. 2. Lower extremity edema. Will obtain an ultrasound of her legs and make sure there is no DVT. Otherwise, this would be most consistent with lymphedema/venous insufficiency. 3. Obesity. Working on diet and exercise. 4. COPD. Followed by pulmonary. 5. Essential hypertension. Blood pressure is controlled. If you have questions, please do not hesitate to call me.     Sincerely,      Pallavi Smyth MD

## 2023-02-15 NOTE — PATIENT INSTRUCTIONS
You will be scheduled for an echocardiogram and venous doppler of lower extremities. Our office will call you with results.

## 2023-03-08 DIAGNOSIS — J44.9 CHRONIC OBSTRUCTIVE PULMONARY DISEASE, UNSPECIFIED COPD TYPE (HCC): ICD-10-CM

## 2023-03-08 DIAGNOSIS — I47.20 VT (VENTRICULAR TACHYCARDIA): ICD-10-CM

## 2023-03-09 RX ORDER — CARVEDILOL 12.5 MG/1
TABLET ORAL
Qty: 180 TABLET | Refills: 1 | Status: SHIPPED | OUTPATIENT
Start: 2023-03-09

## 2023-03-09 NOTE — TELEPHONE ENCOUNTER
Requested Prescriptions     Signed Prescriptions Disp Refills    carvediloL (COREG) 12.5 mg tablet 180 Tablet 1     Sig: TAKE 1 TABLET BY MOUTH TWICE DAILY FOR HIGH BLOOD PRESSURE     Authorizing Provider: Sharon Daigle     Ordering User: Alice Perez     Verbal order per Dr. Tavia Paz.     Future Appointments   Date Time Provider Jose Valentino   3/23/2023  1:00 PM YUMIKO VILLALPANDO   3/30/2023  1:00 PM VASCULARYUMIKO AMB

## 2023-06-16 ENCOUNTER — ANCILLARY PROCEDURE (OUTPATIENT)
Age: 63
End: 2023-06-16
Payer: MEDICAID

## 2023-06-16 VITALS — HEIGHT: 63 IN | WEIGHT: 293 LBS | BODY MASS INDEX: 51.91 KG/M2

## 2023-06-16 DIAGNOSIS — R06.02 SHORT OF BREATH ON EXERTION: ICD-10-CM

## 2023-06-16 DIAGNOSIS — R60.0 BILATERAL LOWER EXTREMITY EDEMA: ICD-10-CM

## 2023-06-16 DIAGNOSIS — R00.2 HEART PALPITATIONS: ICD-10-CM

## 2023-06-16 PROCEDURE — 93306 TTE W/DOPPLER COMPLETE: CPT

## 2023-06-22 ENCOUNTER — TELEPHONE (OUTPATIENT)
Age: 63
End: 2023-06-22

## 2023-06-22 LAB
ECHO AO ASC DIAM: 4.3 CM
ECHO AO ASCENDING AORTA INDEX: 1.77 CM/M2
ECHO AO ROOT DIAM: 3.3 CM
ECHO AO ROOT INDEX: 1.36 CM/M2
ECHO AV AREA PEAK VELOCITY: 4.4 CM2
ECHO AV AREA VTI: 4.3 CM2
ECHO AV AREA/BSA PEAK VELOCITY: 1.8 CM2/M2
ECHO AV AREA/BSA VTI: 1.8 CM2/M2
ECHO AV MEAN GRADIENT: 4 MMHG
ECHO AV MEAN VELOCITY: 0.9 M/S
ECHO AV PEAK GRADIENT: 7 MMHG
ECHO AV PEAK VELOCITY: 1.3 M/S
ECHO AV VELOCITY RATIO: 0.92
ECHO AV VTI: 26.8 CM
ECHO BSA: 2.63 M2
ECHO LA DIAMETER INDEX: 2.14 CM/M2
ECHO LA DIAMETER: 5.2 CM
ECHO LA TO AORTIC ROOT RATIO: 1.58
ECHO LA VOL 2C: 62 ML (ref 22–52)
ECHO LA VOL 4C: 79 ML (ref 22–52)
ECHO LA VOL BP: 73 ML (ref 22–52)
ECHO LA VOL/BSA BIPLANE: 30 ML/M2 (ref 16–34)
ECHO LA VOLUME AREA LENGTH: 77 ML
ECHO LA VOLUME INDEX A2C: 26 ML/M2 (ref 16–34)
ECHO LA VOLUME INDEX A4C: 33 ML/M2 (ref 16–34)
ECHO LA VOLUME INDEX AREA LENGTH: 32 ML/M2 (ref 16–34)
ECHO LV E' LATERAL VELOCITY: 11 CM/S
ECHO LV E' SEPTAL VELOCITY: 9 CM/S
ECHO LV EDV A2C: 100 ML
ECHO LV EDV A4C: 165 ML
ECHO LV EDV BP: 132 ML (ref 56–104)
ECHO LV EDV INDEX A4C: 68 ML/M2
ECHO LV EDV INDEX BP: 54 ML/M2
ECHO LV EDV NDEX A2C: 41 ML/M2
ECHO LV EJECTION FRACTION A2C: 45 %
ECHO LV EJECTION FRACTION A4C: 46 %
ECHO LV EJECTION FRACTION BIPLANE: 47 % (ref 55–100)
ECHO LV ESV A2C: 54 ML
ECHO LV ESV A4C: 89 ML
ECHO LV ESV BP: 70 ML (ref 19–49)
ECHO LV ESV INDEX A2C: 22 ML/M2
ECHO LV ESV INDEX A4C: 37 ML/M2
ECHO LV ESV INDEX BP: 29 ML/M2
ECHO LV FRACTIONAL SHORTENING: 46 % (ref 28–44)
ECHO LV INTERNAL DIMENSION DIASTOLE INDEX: 2.14 CM/M2
ECHO LV INTERNAL DIMENSION DIASTOLIC: 5.2 CM (ref 3.9–5.3)
ECHO LV INTERNAL DIMENSION SYSTOLIC INDEX: 1.15 CM/M2
ECHO LV INTERNAL DIMENSION SYSTOLIC: 2.8 CM
ECHO LV IVSD: 1.1 CM (ref 0.6–0.9)
ECHO LV MASS 2D: 207.3 G (ref 67–162)
ECHO LV MASS INDEX 2D: 85.3 G/M2 (ref 43–95)
ECHO LV POSTERIOR WALL DIASTOLIC: 1 CM (ref 0.6–0.9)
ECHO LV RELATIVE WALL THICKNESS RATIO: 0.38
ECHO LVOT AREA: 4.9 CM2
ECHO LVOT AV VTI INDEX: 0.88
ECHO LVOT DIAM: 2.5 CM
ECHO LVOT MEAN GRADIENT: 3 MMHG
ECHO LVOT PEAK GRADIENT: 6 MMHG
ECHO LVOT PEAK VELOCITY: 1.2 M/S
ECHO LVOT STROKE VOLUME INDEX: 47.9 ML/M2
ECHO LVOT SV: 116.3 ML
ECHO LVOT VTI: 23.7 CM
ECHO MV A VELOCITY: 0.92 M/S
ECHO MV E DECELERATION TIME (DT): 325.9 MS
ECHO MV E VELOCITY: 0.68 M/S
ECHO MV E/A RATIO: 0.74
ECHO MV E/E' LATERAL: 6.18
ECHO MV E/E' RATIO (AVERAGED): 6.87
ECHO MV E/E' SEPTAL: 7.56
ECHO RV FREE WALL PEAK S': 17 CM/S
ECHO RV TAPSE: 2.8 CM (ref 1.7–?)

## 2023-06-22 RX ORDER — LOSARTAN POTASSIUM 25 MG/1
TABLET ORAL
Qty: 90 TABLET | Refills: 1 | Status: SHIPPED | OUTPATIENT
Start: 2023-06-22

## 2023-06-22 NOTE — TELEPHONE ENCOUNTER
Requested Prescriptions     Signed Prescriptions Disp Refills    losartan (COZAAR) 25 MG tablet 90 tablet 1     Sig: Take 1 tablet by mouth once daily     Authorizing Provider: Edis Leonard     Ordering User: Kathi Lynn     Verbal order per Dr. Herminio Duverney.     Future Appointments   Date Time Provider Yomaira Suarez   6/28/2023  3:00 PM [facility]ASCULAR TAZ KENNEDY AMB

## 2023-06-26 ENCOUNTER — TELEPHONE (OUTPATIENT)
Age: 63
End: 2023-06-26

## 2023-06-26 ENCOUNTER — TELEMEDICINE (OUTPATIENT)
Facility: CLINIC | Age: 63
End: 2023-06-26
Payer: MEDICAID

## 2023-06-26 DIAGNOSIS — E11.9 CONTROLLED TYPE 2 DIABETES MELLITUS WITHOUT COMPLICATION, WITHOUT LONG-TERM CURRENT USE OF INSULIN (HCC): Primary | ICD-10-CM

## 2023-06-26 DIAGNOSIS — K59.00 CONSTIPATION, UNSPECIFIED CONSTIPATION TYPE: ICD-10-CM

## 2023-06-26 DIAGNOSIS — I77.810 ASCENDING AORTA DILATION (HCC): Primary | ICD-10-CM

## 2023-06-26 PROCEDURE — 99214 OFFICE O/P EST MOD 30 MIN: CPT | Performed by: FAMILY MEDICINE

## 2023-06-26 RX ORDER — SEMAGLUTIDE 0.68 MG/ML
0.25 INJECTION, SOLUTION SUBCUTANEOUS
Qty: 3 ML | Refills: 3 | Status: SHIPPED | OUTPATIENT
Start: 2023-06-26

## 2023-06-26 RX ORDER — DOCUSATE SODIUM 100 MG/1
100 CAPSULE, LIQUID FILLED ORAL 2 TIMES DAILY
Qty: 60 CAPSULE | Refills: 0 | Status: SHIPPED | OUTPATIENT
Start: 2023-06-26 | End: 2023-07-26

## 2023-06-26 RX ORDER — METFORMIN HYDROCHLORIDE 500 MG/1
1000 TABLET, EXTENDED RELEASE ORAL
Qty: 60 TABLET | Refills: 5 | Status: SHIPPED | OUTPATIENT
Start: 2023-06-26

## 2023-06-28 ENCOUNTER — ANCILLARY PROCEDURE (OUTPATIENT)
Age: 63
End: 2023-06-28
Payer: MEDICAID

## 2023-06-28 VITALS — HEIGHT: 63 IN | WEIGHT: 293 LBS | BODY MASS INDEX: 51.91 KG/M2

## 2023-06-28 DIAGNOSIS — R60.9 EDEMA: ICD-10-CM

## 2023-06-28 LAB — ECHO BSA: 2.63 M2

## 2023-06-28 PROCEDURE — 93970 EXTREMITY STUDY: CPT

## 2023-06-28 PROCEDURE — 93970 EXTREMITY STUDY: CPT | Performed by: INTERNAL MEDICINE

## 2023-06-29 ENCOUNTER — TELEPHONE (OUTPATIENT)
Age: 63
End: 2023-06-29

## 2023-07-12 DIAGNOSIS — E11.9 CONTROLLED TYPE 2 DIABETES MELLITUS WITHOUT COMPLICATION, WITHOUT LONG-TERM CURRENT USE OF INSULIN (HCC): ICD-10-CM

## 2023-07-13 DIAGNOSIS — R79.89 ELEVATED SERUM CREATININE: Primary | ICD-10-CM

## 2023-07-13 LAB
ALBUMIN SERPL-MCNC: 4.1 G/DL (ref 3.8–4.9)
ALBUMIN/GLOB SERPL: 1.5 {RATIO} (ref 1.2–2.2)
ALP SERPL-CCNC: 92 IU/L (ref 44–121)
ALT SERPL-CCNC: 18 IU/L (ref 0–32)
AST SERPL-CCNC: 15 IU/L (ref 0–40)
BILIRUB SERPL-MCNC: 0.3 MG/DL (ref 0–1.2)
BUN SERPL-MCNC: 13 MG/DL (ref 8–27)
BUN/CREAT SERPL: 12 (ref 12–28)
CALCIUM SERPL-MCNC: 9.6 MG/DL (ref 8.7–10.3)
CHLORIDE SERPL-SCNC: 96 MMOL/L (ref 96–106)
CHOLEST SERPL-MCNC: 174 MG/DL (ref 100–199)
CO2 SERPL-SCNC: 27 MMOL/L (ref 20–29)
CREAT SERPL-MCNC: 1.11 MG/DL (ref 0.57–1)
EGFRCR SERPLBLD CKD-EPI 2021: 57 ML/MIN/1.73
GLOBULIN SER CALC-MCNC: 2.7 G/DL (ref 1.5–4.5)
GLUCOSE SERPL-MCNC: 248 MG/DL (ref 70–99)
HBA1C MFR BLD: 10.8 % (ref 4.8–5.6)
HDLC SERPL-MCNC: 40 MG/DL
LDLC SERPL CALC-MCNC: 97 MG/DL (ref 0–99)
POTASSIUM SERPL-SCNC: 4.6 MMOL/L (ref 3.5–5.2)
PROT SERPL-MCNC: 6.8 G/DL (ref 6–8.5)
SODIUM SERPL-SCNC: 138 MMOL/L (ref 134–144)
TRIGL SERPL-MCNC: 216 MG/DL (ref 0–149)
VLDLC SERPL CALC-MCNC: 37 MG/DL (ref 5–40)

## 2023-07-22 SDOH — ECONOMIC STABILITY: FOOD INSECURITY: WITHIN THE PAST 12 MONTHS, YOU WORRIED THAT YOUR FOOD WOULD RUN OUT BEFORE YOU GOT MONEY TO BUY MORE.: NEVER TRUE

## 2023-07-22 SDOH — ECONOMIC STABILITY: FOOD INSECURITY: WITHIN THE PAST 12 MONTHS, THE FOOD YOU BOUGHT JUST DIDN'T LAST AND YOU DIDN'T HAVE MONEY TO GET MORE.: NEVER TRUE

## 2023-07-22 SDOH — ECONOMIC STABILITY: TRANSPORTATION INSECURITY
IN THE PAST 12 MONTHS, HAS LACK OF TRANSPORTATION KEPT YOU FROM MEETINGS, WORK, OR FROM GETTING THINGS NEEDED FOR DAILY LIVING?: NO

## 2023-07-22 SDOH — ECONOMIC STABILITY: HOUSING INSECURITY
IN THE LAST 12 MONTHS, WAS THERE A TIME WHEN YOU DID NOT HAVE A STEADY PLACE TO SLEEP OR SLEPT IN A SHELTER (INCLUDING NOW)?: NO

## 2023-07-22 SDOH — ECONOMIC STABILITY: INCOME INSECURITY: HOW HARD IS IT FOR YOU TO PAY FOR THE VERY BASICS LIKE FOOD, HOUSING, MEDICAL CARE, AND HEATING?: SOMEWHAT HARD

## 2023-07-24 ENCOUNTER — TELEMEDICINE (OUTPATIENT)
Facility: CLINIC | Age: 63
End: 2023-07-24
Payer: MEDICAID

## 2023-07-24 DIAGNOSIS — E11.9 CONTROLLED TYPE 2 DIABETES MELLITUS WITHOUT COMPLICATION, WITHOUT LONG-TERM CURRENT USE OF INSULIN (HCC): Primary | ICD-10-CM

## 2023-07-24 PROCEDURE — 99214 OFFICE O/P EST MOD 30 MIN: CPT | Performed by: FAMILY MEDICINE

## 2023-07-24 PROCEDURE — 3046F HEMOGLOBIN A1C LEVEL >9.0%: CPT | Performed by: FAMILY MEDICINE

## 2023-07-24 RX ORDER — LANCETS 30 GAUGE
1 EACH MISCELLANEOUS 3 TIMES DAILY
Qty: 100 EACH | Refills: 2 | Status: SHIPPED | OUTPATIENT
Start: 2023-07-24

## 2023-07-24 SDOH — ECONOMIC STABILITY: FOOD INSECURITY: WITHIN THE PAST 12 MONTHS, YOU WORRIED THAT YOUR FOOD WOULD RUN OUT BEFORE YOU GOT MONEY TO BUY MORE.: NEVER TRUE

## 2023-07-24 SDOH — ECONOMIC STABILITY: INCOME INSECURITY: HOW HARD IS IT FOR YOU TO PAY FOR THE VERY BASICS LIKE FOOD, HOUSING, MEDICAL CARE, AND HEATING?: SOMEWHAT HARD

## 2023-07-24 SDOH — ECONOMIC STABILITY: FOOD INSECURITY: WITHIN THE PAST 12 MONTHS, THE FOOD YOU BOUGHT JUST DIDN'T LAST AND YOU DIDN'T HAVE MONEY TO GET MORE.: NEVER TRUE

## 2023-07-24 NOTE — PROGRESS NOTES
Kevin Sanchez (:  1960) is a Established patient, presenting virtually for evaluation of the following:    Assessment & Plan   Below is the assessment and plan developed based on review of pertinent history, physical exam, labs, studies, and medications. 1. Controlled type 2 diabetes mellitus without complication, without long-term current use of insulin (HCC)  -     Dulaglutide SOPN 0.75 mg; 0.75 mg, SubCUTAneous, EVERY 7 DAYS, First dose on 23 at 1515  -     blood glucose test strips (ASCENSIA AUTODISC VI;ONE TOUCH ULTRA TEST VI) strip; DAILY Starting 2023, Disp-100 each, R-3, NormalAs needed. -     OneTouch Delica Lancets 77T MISC; 1 Pen Needle by Does not apply route in the morning, at noon, and at bedtime, Disp-100 each, R-2Normal  -     [facility] ([facility]    Return in about 4 weeks (around 2023) for thredUP. Subjective     Patient is a 46-year-old female who is following up on diabetes. Patient states that she was not approved for Ozempic but her insurance company states that she will be approved if she fails to go the following:   Favian Perry and kavya  Patient has not been on these medications before I would like to try these medications. Currently taking metformin 1000 mg daily.   Blood sugars are slightly down to 270  Objective   Patient-Reported Vitals  No data recorded     Physical Exam  [INSTRUCTIONS:  \"[x]\" Indicates a positive item  \"[]\" Indicates a negative item  -- DELETE ALL ITEMS NOT EXAMINED]    Constitutional: [x] Appears well-developed and well-nourished [x] No apparent distress      [] Abnormal -     Mental status: [x] Alert and awake  [x] Oriented to person/place/time [x] Able to follow commands    [] Abnormal -     Eyes:   EOM    [x]  Normal    [] Abnormal -   Sclera  [x]  Normal    [] Abnormal -          Discharge [x]  None visible   [] Abnormal -     HENT: [x] Normocephalic, atraumatic  [] Abnormal

## 2023-08-02 ENCOUNTER — OFFICE VISIT (OUTPATIENT)
Facility: CLINIC | Age: 63
End: 2023-08-02
Payer: MEDICAID

## 2023-08-02 VITALS
WEIGHT: 293 LBS | OXYGEN SATURATION: 91 % | DIASTOLIC BLOOD PRESSURE: 87 MMHG | BODY MASS INDEX: 51.91 KG/M2 | RESPIRATION RATE: 16 BRPM | HEART RATE: 70 BPM | TEMPERATURE: 98 F | HEIGHT: 63 IN | SYSTOLIC BLOOD PRESSURE: 145 MMHG

## 2023-08-02 DIAGNOSIS — H69.81 DYSFUNCTION OF RIGHT EUSTACHIAN TUBE: ICD-10-CM

## 2023-08-02 DIAGNOSIS — M77.8 FLEXOR CARPI ULNARIS TENDINITIS: ICD-10-CM

## 2023-08-02 DIAGNOSIS — E11.9 CONTROLLED TYPE 2 DIABETES MELLITUS WITHOUT COMPLICATION, WITHOUT LONG-TERM CURRENT USE OF INSULIN (HCC): ICD-10-CM

## 2023-08-02 DIAGNOSIS — Z00.00 MEDICARE ANNUAL WELLNESS VISIT, SUBSEQUENT: Primary | ICD-10-CM

## 2023-08-02 DIAGNOSIS — Z12.31 ENCOUNTER FOR SCREENING MAMMOGRAM FOR MALIGNANT NEOPLASM OF BREAST: ICD-10-CM

## 2023-08-02 PROCEDURE — G0439 PPPS, SUBSEQ VISIT: HCPCS | Performed by: FAMILY MEDICINE

## 2023-08-02 PROCEDURE — 99214 OFFICE O/P EST MOD 30 MIN: CPT | Performed by: FAMILY MEDICINE

## 2023-08-02 PROCEDURE — 3077F SYST BP >= 140 MM HG: CPT | Performed by: FAMILY MEDICINE

## 2023-08-02 PROCEDURE — 3046F HEMOGLOBIN A1C LEVEL >9.0%: CPT | Performed by: FAMILY MEDICINE

## 2023-08-02 PROCEDURE — 3079F DIAST BP 80-89 MM HG: CPT | Performed by: FAMILY MEDICINE

## 2023-08-02 ASSESSMENT — PATIENT HEALTH QUESTIONNAIRE - PHQ9
SUM OF ALL RESPONSES TO PHQ9 QUESTIONS 1 & 2: 0
SUM OF ALL RESPONSES TO PHQ QUESTIONS 1-9: 0
SUM OF ALL RESPONSES TO PHQ QUESTIONS 1-9: 0
1. LITTLE INTEREST OR PLEASURE IN DOING THINGS: 0
SUM OF ALL RESPONSES TO PHQ QUESTIONS 1-9: 0
1. LITTLE INTEREST OR PLEASURE IN DOING THINGS: 0
SUM OF ALL RESPONSES TO PHQ QUESTIONS 1-9: 0
2. FEELING DOWN, DEPRESSED OR HOPELESS: 0
SUM OF ALL RESPONSES TO PHQ QUESTIONS 1-9: 0
SUM OF ALL RESPONSES TO PHQ QUESTIONS 1-9: 0
2. FEELING DOWN, DEPRESSED OR HOPELESS: 0
SUM OF ALL RESPONSES TO PHQ9 QUESTIONS 1 & 2: 0

## 2023-08-02 ASSESSMENT — LIFESTYLE VARIABLES
HOW MANY STANDARD DRINKS CONTAINING ALCOHOL DO YOU HAVE ON A TYPICAL DAY: PATIENT DOES NOT DRINK
HOW OFTEN DO YOU HAVE A DRINK CONTAINING ALCOHOL: NEVER

## 2023-08-02 NOTE — PATIENT INSTRUCTIONS
6 months. Try to get at least 150 minutes of exercise per week or 10,000 steps per day on a pedometer . Order or download the FREE \"Exercise & Physical Activity: Your Everyday Guide\" from [employer]. Call 8-286.164.1634 or search [employer] online. You need 9286-5579 mg of calcium and 1586-0687 IU of vitamin D per day. It is possible to meet your calcium requirement with diet alone, but a vitamin D supplement is usually necessary to meet this goal.  When exposed to the sun, use a sunscreen that protects against both UVA and UVB radiation with an SPF of 30 or greater. Reapply every 2 to 3 hours or after sweating, drying off with a towel, or swimming. Always wear a seat belt when traveling in a car. Always wear a helmet when riding a bicycle or motorcycle.

## 2023-08-03 ENCOUNTER — TELEPHONE (OUTPATIENT)
Facility: CLINIC | Age: 63
End: 2023-08-03

## 2023-08-03 RX ORDER — DULAGLUTIDE 0.75 MG/.5ML
0.75 INJECTION, SOLUTION SUBCUTANEOUS WEEKLY
Qty: 1 ADJUSTABLE DOSE PRE-FILLED PEN SYRINGE | Refills: 5 | Status: SHIPPED | OUTPATIENT
Start: 2023-08-03

## 2023-08-03 NOTE — TELEPHONE ENCOUNTER
Patient called stating insurance will not pay for ozempic they offer victoza,trulicity  and byetta.  Please select one for her

## 2023-10-02 NOTE — PROGRESS NOTES
Name: Jeanie Rawls  YOB: 1978  Gender: female  MRN: [id number]    Summary:   Left suspected fibular stress fracture       CC: New Patient (Left ankle  printed x-rays from EPIC)       HPI: Jeanie Rawls is a 39 y.o. female who presents with New Patient (Left ankle  printed x-rays from EPIC)  . This patient presents the office today with an acute onset about 4 to 5 days of worsening pain located lateral aspect of her left ankle intravenously trauma. She was seen in the urgent care and followed by the emergency room had an x-ray performed. She is unable to bear weight. She is swelling and pain located lateral aspect of the ankle. The emergency room did not think she had a blood clot and did not do a Doppler ultrasound test.    History was obtained by Patient     ROS/Meds/PSH/PMH/FH/SH: I personally reviewed the patients standard intake form. Below are the pertinents    Tobacco:  reports that she has never smoked. She has never used smokeless tobacco.  Diabetes: None      Physical Examination:    Exam of the left foot and ankle shows tenderness palpation and swelling over the distal fibula. There is no pain over the peroneal tendons. She does have mild medial ankle tenderness. There is no sign of DVT noted. She has palpable pulses and intact sensation. Imaging:   I independently interpreted XR ordered by a different physician, taken from an outside facility which shows no definite fracture           Huber Ellis III, MD           Assessment:   Left suspected fibular stress fracture    Treatment Plan:   4 This is a chronic illness/condition with exacerbation and progression  Treatment at this time: Bracing: Placed in: 3D boot  Studies ordered:  Ankle XR needed @ Next Visit    Weight-bearing status: WBAT        Return to work/work restrictions: none  No medications given    = TELEPHONE VISIT DOCUMENTATION     Pursuant to the emergency declaration under the [facility] waiver authority and the [employer] Act, this Telephone Visit was conducted, with patient's consent, to reduce the patient's risk of exposure to COVID-19 and provide continuity of care for an established patient. She and/or health care decision maker is aware that that she may receive a bill for this telephone service, depending on her insurance coverage, and has provided verbal consent to proceed. This is a Patient Initiated Episode with an Established Patient who has not had a related appointment within my department in the past 7 days or scheduled within the next 24 hours. HISTORY OF PRESENTING ILLNESS      Austin Linares is a 64 y.o. female evaluated via telephone on 5/27/2020 due to COVID 19 restrictions. Patient unable to participate in Virtual Visit with synchronous audio/visual technology. History of Present Illness:  Ms. Rhea Urbina is a 63 yo F with a history of morbid obesity, COPD, followed in the past by Dr. Nidia Wilder for her ventricular tachycardia. She was placed on Coreg for this and has tolerated this well. Cardiac catheterization in 09/2019 demonstrated no significant coronary artery disease. She also had an echocardiogram that demonstrated preserved LV function, EF of 55-60%. From a symptom standpoint, she has done well with no chest pain, palpitations, lightheadedness or dizziness. She does have baseline shortness of breath she attributes to COPD. She has been trying to work on her diet and her blood pressure and weight have been stable. Assessment and Plan:    1. Ventricular tachycardia. Followed closely by Dr. Nidia Wilder. Cardiac workup in the past including cardiac catheterization and echocardiogram in 2019 were normal.  Will have her continue her beta blocker and follow up in one year.     2. Morbid obesity. She is working on diet and exercise. 3. COPD. Followed by pulmonary. 4. Essential hypertension. Blood pressure is controlled. ASSESSMENT/PLAN:      We discussed the expected course, resolution and complications of the diagnosis(es) in detail. Medication risks, benefits, costs, interactions, and alternatives were discussed as indicated. I advised her to contact the office if her condition worsens, changes or fails to improve as anticipated. She expressed understanding with the diagnosis(es) and plan       ACTIVE PROBLEM LIST     Patient Active Problem List    Diagnosis Date Noted    Obesity, morbid ([address] 09/18/2019    Type II diabetes mellitus with complication, uncontrolled ([address] 09/18/2019    Acute on chronic respiratory failure with hypoxia and hypercapnia (HCC) 09/18/2019    Edema 09/18/2019    CORWIN and COPD overlap syndrome ([address] 09/18/2019    Chronic obstructive pulmonary disease ([facility] 09/18/2019    Elevated blood pressure reading in office with diagnosis of hypertension 09/18/2019    Chronic bilateral low back pain 09/18/2019    CORWIN on CPAP 09/18/2019    Seizure ([address] 09/05/2019    Altered mental status 09/05/2019    Elevated serum creatinine 09/05/2019    Left-sided weakness 09/05/2019    Essential hypertension 09/05/2019    VT (ventricular tachycardia) ([address] 09/05/2019           PAST MEDICAL HISTORY     Past Medical History:   Diagnosis Date    Chronic obstructive pulmonary disease ([address]     Hypertension     Seizures ([address]            PAST SURGICAL HISTORY     No past surgical history on file.        ALLERGIES     Allergies   Allergen Reactions    Pcn [Penicillins] Unknown (comments)          FAMILY HISTORY     Family History   Problem Relation Age of Onset    Breast Cancer Mother     Heart Attack Father     Hypertension Sister     Hypertension Brother     Cancer Maternal Aunt         \"bone\"    negative for cardiac disease       SOCIAL HISTORY Social History     Socioeconomic History    Marital status: SINGLE     Spouse name: Not on file    Number of children: Not on file    Years of education: Not on file    Highest education level: Not on file   Occupational History    Occupation: Adm Asst   Tobacco Use    Smoking status: Former Smoker    Smokeless tobacco: Never Used   Substance and Sexual Activity    Alcohol use: Not Currently    Drug use: Not Currently    Sexual activity: Not Currently   Social History Narrative    Lives in [address]     Current Outpatient Medications   Medication Sig    busPIRone (BUSPAR) 10 mg tablet TAKE 1/2 TABLETS BY MOUTH TO 2 THREE TIMES DAILY AS NEEDED FOR ANXIETY    carvedilol (COREG) 12.5 mg tablet Take 1 Tab by mouth two (2) times a day. Indications: high blood pressure    metFORMIN (GLUCOPHAGE) 500 mg tablet Take 1 Tab by mouth two (2) times daily (with meals).  levETIRAcetam (KEPPRA) 500 mg tablet Take 1 Tab by mouth two (2) times a day.  albuterol (PROVENTIL VENTOLIN) 2.5 mg /3 mL (0.083 %) nebu     torsemide (DEMADEX) 20 mg tablet Take 20 mg by mouth two (2) times a day.  aspirin 81 mg chewable tablet Take 1 Tab by mouth daily.  losartan (COZAAR) 25 mg tablet Take 25 mg by mouth daily.  naproxen (NAPROSYN) 375 mg tablet Take 375 mg by mouth two (2) times daily (with meals).  umeclidinium-vilanterol (ANORO ELLIPTA) 62.5-25 mcg/actuation inhaler Take 1 Puff by inhalation daily.  fluticasone propionate (FLOVENT DISKUS) 250 mcg/actuation dsdv Take 250 mcg by inhalation two (2) times a day.  albuterol (PROVENTIL HFA, VENTOLIN HFA, PROAIR HFA) 90 mcg/actuation inhaler Take 1 Puff by inhalation every six (6) hours as needed for Wheezing. No current facility-administered medications for this visit. I have reviewed the nurses notes, vitals, problem list, allergy list, medical history, family, social history and medications.        REVIEW OF SYMPTOMS Constitutional: Negative for fever, chills, malaise/fatigue and diaphoresis. Respiratory: Negative for cough, hemoptysis, sputum production, shortness of breath and wheezing. Cardiovascular: Negative for chest pain, palpitations, orthopnea, claudication, leg swelling and PND. Gastrointestinal: Negative for heartburn, nausea, vomiting, blood in stool and melena. Genitourinary: Negative for dysuria and flank pain. Musculoskeletal: Negative for joint pain and back pain. Skin: Negative for rash. Neurological: Negative for focal weakness, seizures, loss of consciousness, weakness and headaches. Endo/Heme/Allergies: Negative for abnormal bleeding. Psychiatric/Behavioral: Negative for memory loss. DIAGNOSTIC DATA      No specialty comments available. LABORATORY DATA      Lab Results   Component Value Date/Time    WBC 9.1 09/13/2019 02:06 AM    HGB 13.3 09/13/2019 02:06 AM    HCT 42.9 09/13/2019 02:06 AM    PLATELET 177 14/45/1194 02:06 AM    MCV 97.7 09/13/2019 02:06 AM      Lab Results   Component Value Date/Time    Sodium 141 09/13/2019 02:06 AM    Potassium 4.2 09/13/2019 02:06 AM    Chloride 106 09/13/2019 02:06 AM    CO2 29 09/13/2019 02:06 AM    Anion gap 6 09/13/2019 02:06 AM    Glucose 151 (H) 09/13/2019 02:06 AM    BUN 27 (H) 09/13/2019 02:06 AM    Creatinine 1.04 (H) 09/13/2019 02:06 AM    BUN/Creatinine ratio 26 (H) 09/13/2019 02:06 AM    GFR est AA >60 09/13/2019 02:06 AM    GFR est non-AA 55 (L) 09/13/2019 02:06 AM    Calcium 9.5 09/13/2019 02:06 AM    Bilirubin, total 0.5 09/13/2019 02:06 AM    Alk.  phosphatase 69 09/13/2019 02:06 AM    Protein, total 7.8 09/13/2019 02:06 AM    Albumin 3.7 09/13/2019 02:06 AM    Globulin 4.1 (H) 09/13/2019 02:06 AM    A-G Ratio 0.9 (L) 09/13/2019 02:06 AM    ALT (SGPT) 48 09/13/2019 02:06 AM            Total Time: 15 minutes      Lesly Raymon, MD    [address]  Suite [facility] [address]  [phone] / [phone] Fax

## 2023-11-03 ENCOUNTER — TELEMEDICINE (OUTPATIENT)
Age: 63
End: 2023-11-03

## 2023-11-03 DIAGNOSIS — E11.9 TYPE 2 DIABETES MELLITUS WITHOUT COMPLICATION, WITHOUT LONG-TERM CURRENT USE OF INSULIN (HCC): Primary | ICD-10-CM

## 2023-11-03 NOTE — PROGRESS NOTES
C) state accepted blood glucose targets. Healthy Eating  The participant can   A) identify carbohydrate foods; &   B) accurately read food labels. Being Active  The participant can  A) state the benefits of physical activity;  B) report their current PA practices;  C) identify PA they would consider incorporating in their lives; &  D) develop an implementation plan. Monitoring  The participant can  A) operate their blood glucose meter; &  B) describe how they log their blood glucoses to share with their provider. Taking Medications  The participant can  A) name their diabetes medications;  B) state the purpose and dose;  C) note side effects; &  D) describe proper storage, disposal & transport (if appropriate). Healthy Coping  The participant can    A) describe their response to diabetes diagnosis; B) describe their specific coping mechanisms;  C) identify supportive people and/or other resources that positively support their diabetes self-care and health. Reducing Risks  The participant can describe the preventive measures used by providers to promote health and prevent diabetes complications. Problem Solving  The participant can   A) identify signs, symptoms & treatment of hypoglycemia;    B) identify signs, symptoms & treatment of hyperglycemia;  C) describe their sick day plan; &  D) identify BG patterns to discuss with their provider.        No  Yes  No        No  No        Yes  No  Yes  No        Yes  No        No  No  No  No        Yes  Yes  Yes        No          No  No  No  No     Characteristics to Learning   Barriers to Learning      None     Favorite Ways to Learn   [] Lecture  [x] Slides  [x] Reading [] Video-Internet  [] Cassettes/CDs/MP3's  [] Interactive Small Groups [x] Other       Behavioral Assessment  Current self-care practices  Educator assessment (11/3/2023)   Healthy Eating   Current practices    24-hour Dietary Recall:  Breakfast: skips sometimes or has raisin

## 2023-11-20 ENCOUNTER — TELEMEDICINE (OUTPATIENT)
Facility: CLINIC | Age: 63
End: 2023-11-20
Payer: MEDICARE

## 2023-11-20 DIAGNOSIS — E11.9 CONTROLLED TYPE 2 DIABETES MELLITUS WITHOUT COMPLICATION, WITHOUT LONG-TERM CURRENT USE OF INSULIN (HCC): Primary | ICD-10-CM

## 2023-11-20 PROCEDURE — 3046F HEMOGLOBIN A1C LEVEL >9.0%: CPT | Performed by: FAMILY MEDICINE

## 2023-11-20 PROCEDURE — 99214 OFFICE O/P EST MOD 30 MIN: CPT | Performed by: FAMILY MEDICINE

## 2023-11-20 RX ORDER — TIRZEPATIDE 2.5 MG/.5ML
2.5 INJECTION, SOLUTION SUBCUTANEOUS WEEKLY
Qty: 0.5 ML | Refills: 3 | Status: SHIPPED | OUTPATIENT
Start: 2023-11-20

## 2023-11-20 NOTE — PROGRESS NOTES
Sariah Mock, was evaluated through a synchronous (real-time) audio-video encounter. The patient (or guardian if applicable) is aware that this is a billable service, which includes applicable co-pays. This Virtual Visit was conducted with patient's (and/or legal guardian's) consent. Patient identification was verified, and a caregiver was present when appropriate. The patient was located at Home: [address] [place] [id number]  Provider was located at Facility (Appt Dept): [address] [place] [address]      Sariah Mock (:  1960) is a Established patient, presenting virtually for evaluation of the following:    Assessment & Plan   Below is the assessment and plan developed based on review of pertinent history, physical exam, labs, studies, and medications. 1. Controlled type 2 diabetes mellitus without complication, without long-term current use of insulin (HCC)  -     Hemoglobin A1C; Future  -     Comprehensive Metabolic Panel; Future  -     Lipid Panel; Future  -     Tirzepatide (MOUNJARO) 2.5 MG/0.5ML SOPN SC injection; Inject 0.5 mLs into the skin once a week, Disp-0.5 mL, R-3Normal    Return in about 3 months (around 2024) for Telemedicine F/U, Diabetic Check. Subjective   Patient is a 77-year-old female who is seen today with history of uncontrolled diabetes. Patient states that her blood sugars have been running better in the 240s. She has not been taking Trulicity as this was not approved but she has since changed insurance companies and would like to [employer]. Currently she is only taking metformin. She denies any hypoglycemic episodes      Review of Systems   All other systems reviewed and are negative.          Objective   Patient-Reported Vitals  No data recorded     Physical Exam  [INSTRUCTIONS:  \"[x]\" Indicates a positive item  \"[]\" Indicates a negative item  -- DELETE ALL ITEMS NOT EXAMINED]    Constitutional: [x] Appears well-developed and

## 2023-12-11 ENCOUNTER — TELEMEDICINE (OUTPATIENT)
Age: 63
End: 2023-12-11
Payer: MEDICARE

## 2023-12-11 DIAGNOSIS — E11.9 TYPE 2 DIABETES MELLITUS WITHOUT COMPLICATION, WITHOUT LONG-TERM CURRENT USE OF INSULIN (HCC): Primary | ICD-10-CM

## 2023-12-11 PROCEDURE — G0108 DIAB MANAGE TRN  PER INDIV: HCPCS

## 2024-01-25 ENCOUNTER — TELEMEDICINE (OUTPATIENT)
Age: 64
End: 2024-01-25

## 2024-01-25 DIAGNOSIS — E11.9 TYPE 2 DIABETES MELLITUS WITHOUT COMPLICATION, WITHOUT LONG-TERM CURRENT USE OF INSULIN (HCC): Primary | ICD-10-CM

## 2024-01-25 NOTE — PROGRESS NOTES
consent. Patient identification was verified, and a caregiver was present when appropriate.   The patient was located at Home: [address] [place] [address] [place] [phone]  Provider was located at Facility (Appt Dept): [address] [place] [address] [id number]  [place] [address]    Total time spent for this encounter:  60 minutes    --Susy Monet RD on 1/25/2024 at 3:24 PM    An electronic signature was used to authenticate this note.

## 2024-02-06 DIAGNOSIS — E11.9 CONTROLLED TYPE 2 DIABETES MELLITUS WITHOUT COMPLICATION, WITHOUT LONG-TERM CURRENT USE OF INSULIN (HCC): ICD-10-CM

## 2024-02-06 RX ORDER — METFORMIN HYDROCHLORIDE 500 MG/1
1000 TABLET, EXTENDED RELEASE ORAL
Qty: 60 TABLET | Refills: 5 | Status: SHIPPED | OUTPATIENT
Start: 2024-02-06

## 2024-02-06 RX ORDER — LANCETS 30 GAUGE
1 EACH MISCELLANEOUS 3 TIMES DAILY
Qty: 100 EACH | Refills: 2 | Status: SHIPPED | OUTPATIENT
Start: 2024-02-06

## 2024-02-20 ENCOUNTER — TELEMEDICINE (OUTPATIENT)
Facility: CLINIC | Age: 64
End: 2024-02-20
Payer: MEDICARE

## 2024-02-20 DIAGNOSIS — E11.9 CONTROLLED TYPE 2 DIABETES MELLITUS WITHOUT COMPLICATION, WITHOUT LONG-TERM CURRENT USE OF INSULIN (HCC): Primary | ICD-10-CM

## 2024-02-20 DIAGNOSIS — I89.0 LYMPHEDEMA: ICD-10-CM

## 2024-02-20 DIAGNOSIS — E66.01 MORBID OBESITY WITH BMI OF 50.0-59.9, ADULT (HCC): ICD-10-CM

## 2024-02-20 DIAGNOSIS — Z00.00 MEDICARE ANNUAL WELLNESS VISIT, SUBSEQUENT: ICD-10-CM

## 2024-02-20 DIAGNOSIS — J44.9 CHRONIC OBSTRUCTIVE PULMONARY DISEASE, UNSPECIFIED COPD TYPE (HCC): ICD-10-CM

## 2024-02-20 PROCEDURE — 99214 OFFICE O/P EST MOD 30 MIN: CPT | Performed by: FAMILY MEDICINE

## 2024-02-20 PROCEDURE — G0439 PPPS, SUBSEQ VISIT: HCPCS | Performed by: FAMILY MEDICINE

## 2024-02-20 RX ORDER — LOSARTAN POTASSIUM 100 MG/1
100 TABLET ORAL DAILY
COMMUNITY
Start: 2024-01-28

## 2024-02-20 NOTE — PATIENT INSTRUCTIONS
Starting a Weight Loss Plan: Care Instructions  Overview     If you're thinking about losing weight, it can be hard to know where to start. Your doctor can help you set up a weight loss plan that best meets your needs. You may want to take a class on nutrition or exercise, or you could join a weight loss support group. If you have questions about how to make changes to your eating or exercise habits, ask your doctor about seeing a registered dietitian or an exercise specialist.  It can be a big challenge to lose weight. But you don't have to make huge changes at once. Make small changes, and stick with them. When those changes become habit, add a few more changes.  If you don't think you're ready to make changes right now, try to pick a date in the future. Make an appointment to see your doctor to discuss whether the time is right for you to start a plan.  Follow-up care is a key part of your treatment and safety. Be sure to make and go to all appointments, and call your doctor if you are having problems. It's also a good idea to know your test results and keep a list of the medicines you take.  How can you care for yourself at home?  Set realistic goals. Many people expect to lose much more weight than is likely. A weight loss of 5% to 10% of your body weight may be enough to improve your health.  Get family and friends involved to provide support. Talk to them about why you are trying to lose weight, and ask them to help. They can help by participating in exercise and having meals with you, even if they may be eating something different.  Find what works best for you. If you do not have time or do not like to cook, a program that offers meal replacement bars or shakes may be better for you. Or if you like to prepare meals, finding a plan that includes daily menus and recipes may be best.  Ask your doctor about other health professionals who can help you achieve your weight loss goals.  A dietitian can help 
.

## 2024-02-20 NOTE — PROGRESS NOTES
Tue 2/20/24 at 1400, Until Discontinued  Lymphedema  -     [facility] - Lymphedema Clinic, [place] ([address])  Chronic obstructive pulmonary disease, unspecified COPD type (HCC)  -     [facility] - Pulmonary Rehabilitation ([facility]  Morbid obesity with BMI of 50.0-59.9, adult (HCC)  -     [facility] - Paddy Vick MD, Bariatric Surgery, [place] ([address])  Medicare annual wellness visit, subsequent    Recommendations for Preventive Services Due: see orders and patient instructions/AVS.  Recommended screening schedule for the next 5-10 years is provided to the patient in written form: see Patient Instructions/AVS.     No follow-ups on file.     Subjective       Patient's complete Health Risk Assessment and screening values have been reviewed and are found in Flowsheets. The following problems were reviewed today and where indicated follow up appointments were made and/or referrals ordered.    Positive Risk Factor Screenings with Interventions:                Activity, Diet, and Weight:               There is no height or weight on file to calculate BMI. (!) Abnormal      Inactivity Interventions:  See A/P for plan and any pertinent orders  Obesity Interventions:  See counseling/recommendations below                               Objective      Patient-Reported Vitals  No data recorded       Allergies   Allergen Reactions    Penicillins      Other reaction(s): Unknown (comments)     Prior to Visit Medications    Medication Sig Taking? Authorizing Provider   losartan (COZAAR) 100 MG tablet Take 1 tablet by mouth daily Yes Provider, MD Duane   metFORMIN (GLUCOPHAGE-XR) 500 MG extended release tablet Take 2 tablets by mouth daily (with breakfast)  August Madison MD   blood glucose test strips (ASCENSIA AUTODISC VI;ONE TOUCH ULTRA TEST VI) strip 1 each by In Vitro route daily As needed.  August Madison MD   OneTouch Delica Lancets 30G MISC 1 Pen Needle by Does not apply route in the morning, at noon, and at

## 2024-03-14 RX ORDER — CARVEDILOL 12.5 MG/1
TABLET ORAL
Qty: 180 TABLET | Refills: 0 | Status: SHIPPED | OUTPATIENT
Start: 2024-03-14

## 2024-03-14 NOTE — TELEPHONE ENCOUNTER
Requested Prescriptions     Signed Prescriptions Disp Refills    carvedilol (COREG) 12.5 MG tablet 180 tablet 0     Sig: TAKE 1 TABLET BY MOUTH TWICE DAILY FOR HIGH BLOOD PRESSURE ,  APPOINTMENT  NEEDED  FOR  FURTHER  REFILLS     Authorizing Provider: ADE LAROSE     Ordering User: JEANNIE OBRIEN MD    Future Appointments   Date Time Provider Department Center   3/25/2024  2:30 PM Susy Monet, [facility] BS AMB   5/20/2024  3:30 PM August Madison MD [facility] MAIN BS AMB   5/22/2024  1:00 PM BS [facility] ECHO 2 TAZ BS AMB   5/22/2024  1:40 PM Ade Larose MD [facility] BS AMB

## 2024-03-25 ENCOUNTER — TELEMEDICINE (OUTPATIENT)
Age: 64
End: 2024-03-25
Payer: MEDICARE

## 2024-03-25 DIAGNOSIS — E11.9 TYPE 2 DIABETES MELLITUS WITHOUT COMPLICATION, WITHOUT LONG-TERM CURRENT USE OF INSULIN (HCC): Primary | ICD-10-CM

## 2024-03-25 PROCEDURE — G0108 DIAB MANAGE TRN  PER INDIV: HCPCS

## 2024-03-25 NOTE — PROGRESS NOTES
[facility]  Diabetes Self-Management Education & Support Program  Encounter Note      SUMMARY  Diabetes self-care management training was completed related to healthy eating. he participant will return on April 29 to continue DSMES related to taking medications and physical activity. The participant did not identify SMART Goal(s) and will practice knowledge and skills related to what is diabetes, reducing risks and healthy eating and monitoring to improve diabetes self-management.        EVALUATION:  Met with Ms. Fish virtually today to continue discussing healthy eating with diabetes. She shared that her home health nurse recently checked her A1c and it was 7.7%. Her BGs have consistently been in the 200s per her report. Ms. Fish shared that she wanted to ask her PCP about other diabetes medications, as she feels adding another medication may be beneficial to her BGs. She was prescribed Mounjaro, but was unable to get this medication due to the price.     Today we discussed the recommendation of 45-60 grams of carbohydrates per meal and what that looks like for various foods. We discussed what she typically eats throughout the day and made modifications such as adding a protein and cutting down on her carbohydrates at some meals. We discussed various snack options of 15 grams or less of carbohydrates, as some of her snack options were too much. It was encouraged that she cut out the orange juice she has been drinking, as sugary beverages impact BGs heavily, and she expressed that she would do this.     Ms. Fish expressed that she was motivated to manage her portions at meals more and put these healthy eating principles in to practice. She verbalized understanding of all presented material today and had no further questions or concerns at this time.     RECOMMENDATIONS:  Reach out to PCP to see if she can push up diabetes follow up appointment to discuss medications      TOPICS

## 2024-04-01 ENCOUNTER — TELEPHONE (OUTPATIENT)
Age: 64
End: 2024-04-01

## 2024-04-09 ENCOUNTER — OFFICE VISIT (OUTPATIENT)
Age: 64
End: 2024-04-09
Payer: MEDICARE

## 2024-04-09 VITALS
SYSTOLIC BLOOD PRESSURE: 152 MMHG | HEIGHT: 63 IN | BODY MASS INDEX: 51.91 KG/M2 | DIASTOLIC BLOOD PRESSURE: 84 MMHG | OXYGEN SATURATION: 93 % | HEART RATE: 72 BPM | WEIGHT: 293 LBS | RESPIRATION RATE: 20 BRPM | TEMPERATURE: 98.2 F

## 2024-04-09 DIAGNOSIS — J44.9 OSA AND COPD OVERLAP SYNDROME (HCC): ICD-10-CM

## 2024-04-09 DIAGNOSIS — I87.2 CHRONIC VENOUS INSUFFICIENCY: ICD-10-CM

## 2024-04-09 DIAGNOSIS — K21.9 GASTROESOPHAGEAL REFLUX DISEASE WITHOUT ESOPHAGITIS: ICD-10-CM

## 2024-04-09 DIAGNOSIS — E11.9 TYPE 2 DIABETES MELLITUS WITHOUT COMPLICATION, WITHOUT LONG-TERM CURRENT USE OF INSULIN (HCC): ICD-10-CM

## 2024-04-09 DIAGNOSIS — G47.33 OSA AND COPD OVERLAP SYNDROME (HCC): ICD-10-CM

## 2024-04-09 DIAGNOSIS — R53.82 CHRONIC FATIGUE: ICD-10-CM

## 2024-04-09 DIAGNOSIS — I10 ELEVATED BLOOD PRESSURE READING IN OFFICE WITH DIAGNOSIS OF HYPERTENSION: ICD-10-CM

## 2024-04-09 DIAGNOSIS — E66.01 OBESITY, MORBID (HCC): Primary | ICD-10-CM

## 2024-04-09 LAB
ESTIMATED AVERAGE GLUCOSE: NORMAL
HBA1C MFR BLD: 10.3 %

## 2024-04-09 PROCEDURE — 3079F DIAST BP 80-89 MM HG: CPT | Performed by: SURGERY

## 2024-04-09 PROCEDURE — 99204 OFFICE O/P NEW MOD 45 MIN: CPT | Performed by: SURGERY

## 2024-04-09 PROCEDURE — 3077F SYST BP >= 140 MM HG: CPT | Performed by: SURGERY

## 2024-04-10 LAB
25(OH)D3+25(OH)D2 SERPL-MCNC: 20.5 NG/ML (ref 30–100)
ALBUMIN SERPL-MCNC: 4 G/DL (ref 3.9–4.9)
ALBUMIN/GLOB SERPL: 1.3 {RATIO} (ref 1.2–2.2)
ALP SERPL-CCNC: 91 IU/L (ref 44–121)
ALT SERPL-CCNC: 17 IU/L (ref 0–32)
AST SERPL-CCNC: 18 IU/L (ref 0–40)
BASOPHILS # BLD AUTO: 0.1 X10E3/UL (ref 0–0.2)
BASOPHILS NFR BLD AUTO: 1 %
BILIRUB SERPL-MCNC: 0.5 MG/DL (ref 0–1.2)
BUN SERPL-MCNC: 10 MG/DL (ref 8–27)
BUN/CREAT SERPL: 12 (ref 12–28)
CALCIUM SERPL-MCNC: 9.4 MG/DL (ref 8.7–10.3)
CHLORIDE SERPL-SCNC: 99 MMOL/L (ref 96–106)
CO2 SERPL-SCNC: 25 MMOL/L (ref 20–29)
CREAT SERPL-MCNC: 0.83 MG/DL (ref 0.57–1)
EGFRCR SERPLBLD CKD-EPI 2021: 80 ML/MIN/1.73
EOSINOPHIL # BLD AUTO: 0.1 X10E3/UL (ref 0–0.4)
EOSINOPHIL NFR BLD AUTO: 1 %
ERYTHROCYTE [DISTWIDTH] IN BLOOD BY AUTOMATED COUNT: 13.1 % (ref 11.7–15.4)
FOLATE SERPL-MCNC: 16.4 NG/ML
GLOBULIN SER CALC-MCNC: 3.1 G/DL (ref 1.5–4.5)
GLUCOSE SERPL-MCNC: 212 MG/DL (ref 70–99)
HBA1C MFR BLD: 10.3 % (ref 4.8–5.6)
HCT VFR BLD AUTO: 44 % (ref 34–46.6)
HGB BLD-MCNC: 14 G/DL (ref 11.1–15.9)
IMM GRANULOCYTES # BLD AUTO: 0 X10E3/UL (ref 0–0.1)
IMM GRANULOCYTES NFR BLD AUTO: 1 %
IRON SATN MFR SERPL: 20 % (ref 15–55)
IRON SERPL-MCNC: 65 UG/DL (ref 27–139)
LYMPHOCYTES # BLD AUTO: 1.5 X10E3/UL (ref 0.7–3.1)
LYMPHOCYTES NFR BLD AUTO: 17 %
MCH RBC QN AUTO: 29.7 PG (ref 26.6–33)
MCHC RBC AUTO-ENTMCNC: 31.8 G/DL (ref 31.5–35.7)
MCV RBC AUTO: 93 FL (ref 79–97)
MONOCYTES # BLD AUTO: 0.6 X10E3/UL (ref 0.1–0.9)
MONOCYTES NFR BLD AUTO: 7 %
NEUTROPHILS # BLD AUTO: 6.6 X10E3/UL (ref 1.4–7)
NEUTROPHILS NFR BLD AUTO: 73 %
PLATELET # BLD AUTO: 277 X10E3/UL (ref 150–450)
POTASSIUM SERPL-SCNC: 4.8 MMOL/L (ref 3.5–5.2)
PROT SERPL-MCNC: 7.1 G/DL (ref 6–8.5)
RBC # BLD AUTO: 4.71 X10E6/UL (ref 3.77–5.28)
SODIUM SERPL-SCNC: 138 MMOL/L (ref 134–144)
TIBC SERPL-MCNC: 332 UG/DL (ref 250–450)
TSH SERPL DL<=0.005 MIU/L-ACNC: 1.56 UIU/ML (ref 0.45–4.5)
UIBC SERPL-MCNC: 267 UG/DL (ref 118–369)
UREA BREATH TEST QL: NEGATIVE
VIT B12 SERPL-MCNC: 655 PG/ML (ref 232–1245)
WBC # BLD AUTO: 8.9 X10E3/UL (ref 3.4–10.8)

## 2024-04-10 NOTE — PROGRESS NOTES
Identified patient with two patient identifiers (name and ). Reviewed chart in preparation for visit and have obtained necessary documentation.    Alissa Fish is a 63 y.o. female  Chief Complaint   Patient presents with    Bariatric, Initial Visit     New bariatric patient interested in lap sleeve gastrectomy     BP (!) 152/84 (Site: Left Lower Arm, Position: Sitting, Cuff Size: Large Adult)   Pulse 72   Temp 98.2 °F (36.8 °C)   Resp 20   Ht 1.6 m (5' 3\")   Wt (!) 152.2 kg (335 lb 8 oz)   SpO2 93% Comment: on 2 li n.c.  BMI 59.43 kg/m²     1. Have you been to the ER, urgent care clinic since your last visit?  Hospitalized since your last visit?new patient    2. Have you seen or consulted any other health care providers outside of the [facility] since your last visit?  Include any pap smears or colon screening. New patient    Patient and provider made aware of elevated BP x2. Patient asymptomatic. Patient reminded to monitor BP, continue to take BP medications if prescribed, and follow up with PCP/Cardiologist.  Patient expressed understanding and agreement.    
test strips (ASCENSIA AUTODISC VI;ONE TOUCH ULTRA TEST VI) strip 1 each by In Vitro route daily As needed. 2/6/24  Yes August Madison MD   OneTouch Delica Lancets 30G MISC 1 Pen Needle by Does not apply route in the morning, at noon, and at bedtime 2/6/24  Yes August Maidson MD   OXYGEN 2 liters via NC continuously.   Yes Automatic Reconciliation, Ar   albuterol sulfate HFA (PROVENTIL;VENTOLIN;PROAIR) 108 (90 Base) MCG/ACT inhaler Inhale 1 puff into the lungs every 6 hours as needed   Yes Automatic Reconciliation, Ar   fluticasone propionate (FLOVENT DISKUS) 250 MCG/ACT inhaler Inhale 1 puff into the lungs 2 times daily   Yes Automatic Reconciliation, Ar   umeclidinium-vilanterol (ANORO ELLIPTA) 62.5-25 MCG/ACT inhaler Inhale 1 puff into the lungs daily   Yes Automatic Reconciliation, Ar   albuterol (PROVENTIL) (2.5 MG/3ML) 0.083% nebulizer solution daily as needed  Patient not taking: Reported on 4/9/2024 9/4/19   Automatic Reconciliation, Ar   aspirin 81 MG chewable tablet Take 1 tablet by mouth daily  Patient not taking: Reported on 4/9/2024 9/12/19   Automatic Reconciliation, Ar     Allergies   Allergen Reactions    Penicillins      Other reaction(s): Unknown (comments)         Review of Systems:    Review of Systems - General ROS: positive for  - weight loss  Ophthalmic ROS: negative  ENT ROS: negative  Allergy and Immunology ROS: negative  Hematological and Lymphatic ROS: negative  Endocrine ROS: negative  Respiratory ROS: positive for - shortness of breath  negative for - cough  Cardiovascular ROS: positive for - dyspnea on exertion  negative for - chest pain  Gastrointestinal ROS: positive for - heartburn  negative for - abdominal pain  Genito-Urinary ROS: no dysuria, trouble voiding, or hematuria  Musculoskeletal ROS: positive for - joint pain and joint stiffness  Neurological ROS: no TIA or stroke symptoms  Dermatological ROS: negative      Objective:   BP (!) 152/84 (Site: Left Lower Arm, Position:

## 2024-04-11 ENCOUNTER — CLINICAL DOCUMENTATION (OUTPATIENT)
Age: 64
End: 2024-04-11

## 2024-04-11 NOTE — PROGRESS NOTES
Referrals for Cardiology faxed to [phone]-Dr. Polo and to Pulmonary, Dr. Callahan faxed to [phone]

## 2024-04-19 ENCOUNTER — OFFICE VISIT (OUTPATIENT)
Age: 64
End: 2024-04-19

## 2024-04-19 DIAGNOSIS — E66.01 MORBID OBESITY (HCC): Primary | ICD-10-CM

## 2024-04-19 NOTE — PROGRESS NOTES
gastrectomy.    Instructed on consuming 3 meals per day starting now.  Use the balanced plate method to plan meals, include 3 oz of lean source of protein, 1/2 cup whole grains, unlimited non-starchy vegetables, 1/2 cup fruit and 1 serving of low fat dairy. Utilize handouts listing healthy snack and meal ideas to limit restaurant meals.      After surgery measure all meals to 1/2 cup. Each meal will contain a 1/4 cup lean protein and 1/4 cup fruit, non-starchy vegetable or starch (limiting to once per day). Aim for 60 g protein per day. Sip on 48-64 oz of sugar free, calorie free, non-carbonated beverages each day. Do not use a straw. Do not consume beverages 30 minutes before, during or 30 minutes after meals.     Read all nutrition labels. Demonstrated and emphasized identifying serving size, total fat, sugar and protein content. Defined low fat as </= 3 g per serving. Discussed lean and extra lean sources of protein. Provided list of low fat cooking methods. Avoid foods with sugar listed in the first 3 ingredients and >/15 g sugar per serving. Excess sugar/fat intake may lead to dumping syndrome. Discussed signs and symptoms of dumping syndrome.     Practice mindful eating habits; take small bites, chew thoroughly, avoid distractions, utilize hunger/fullness scale. Consume meals over 20-30 minutes.     Attend Bariatric Support Group and increase physical activity (approved per MD) for long term weight maintenance.      NUTRITION MONITORING AND EVALUATION:    The following goals were established with patient;  Use protein shake for bfast  Eliminate diet soda  Start exercising using bike  4.   Review nutrition education materials. Follow up next month for continue nutrition education.     Specific tips and techniques to facilitate compliance with above recommendations were provided and discussed.  Nutrition evaluation reveals important lifestyle changes are indicated. Goals set and recommendations made. Will

## 2024-05-01 ENCOUNTER — TELEPHONE (OUTPATIENT)
Age: 64
End: 2024-05-01

## 2024-05-01 NOTE — TELEPHONE ENCOUNTER
Called patient to schedule appointment with [facility] for psych eval.  Patient was not interested in waiting until 10/3/24 for [facility]'s next available appointment.  Recommended to patient to call [facility] to schedule eval.

## 2024-05-20 ENCOUNTER — TELEPHONE (OUTPATIENT)
Age: 64
End: 2024-05-20

## 2024-05-20 ENCOUNTER — OFFICE VISIT (OUTPATIENT)
Facility: CLINIC | Age: 64
End: 2024-05-20
Payer: MEDICARE

## 2024-05-20 ENCOUNTER — CLINICAL DOCUMENTATION (OUTPATIENT)
Age: 64
End: 2024-05-20

## 2024-05-20 VITALS
BODY MASS INDEX: 51.91 KG/M2 | RESPIRATION RATE: 24 BRPM | WEIGHT: 293 LBS | HEART RATE: 70 BPM | HEIGHT: 63 IN | OXYGEN SATURATION: 91 % | SYSTOLIC BLOOD PRESSURE: 158 MMHG | DIASTOLIC BLOOD PRESSURE: 83 MMHG | TEMPERATURE: 98.5 F

## 2024-05-20 DIAGNOSIS — E11.9 CONTROLLED TYPE 2 DIABETES MELLITUS WITHOUT COMPLICATION, WITHOUT LONG-TERM CURRENT USE OF INSULIN (HCC): Primary | ICD-10-CM

## 2024-05-20 PROCEDURE — 3077F SYST BP >= 140 MM HG: CPT | Performed by: FAMILY MEDICINE

## 2024-05-20 PROCEDURE — 3079F DIAST BP 80-89 MM HG: CPT | Performed by: FAMILY MEDICINE

## 2024-05-20 PROCEDURE — 3046F HEMOGLOBIN A1C LEVEL >9.0%: CPT | Performed by: FAMILY MEDICINE

## 2024-05-20 PROCEDURE — 99214 OFFICE O/P EST MOD 30 MIN: CPT | Performed by: FAMILY MEDICINE

## 2024-05-20 ASSESSMENT — PATIENT HEALTH QUESTIONNAIRE - PHQ9
SUM OF ALL RESPONSES TO PHQ9 QUESTIONS 1 & 2: 0
1. LITTLE INTEREST OR PLEASURE IN DOING THINGS: NOT AT ALL
SUM OF ALL RESPONSES TO PHQ QUESTIONS 1-9: 0
SUM OF ALL RESPONSES TO PHQ QUESTIONS 1-9: 0
2. FEELING DOWN, DEPRESSED OR HOPELESS: NOT AT ALL
SUM OF ALL RESPONSES TO PHQ QUESTIONS 1-9: 0
SUM OF ALL RESPONSES TO PHQ QUESTIONS 1-9: 0

## 2024-05-20 NOTE — TELEPHONE ENCOUNTER
Identified patient with two patient identifiers (name and ). Reviewed chart in preparation for encounter and have obtained necessary documentation.       Called and spoke with patient regarding [facility] insurance requirements. Patient has appointments coming up for UGI, Psych and Cardiac Clearance. Patient sees PCP today and I informed patient I would fax  over PCP support form now. Patient understood what was discussed and thankful for the call.

## 2024-05-20 NOTE — PROGRESS NOTES
PCP support form successfully faxed to PCP verified on file. Fax number on file not correct, called PCP office and new fax number is [phone].

## 2024-05-20 NOTE — PROGRESS NOTES
No results found for: \"MCA2\"      Reviewed and agree with Nurse Note and duplicated in this note.  Reviewed PmHx, RxHx, FmHx, SocHx, AllgHx and updated and dated in the chart.    Family History   Problem Relation Age of Onset    Breast Cancer Mother         Breast and then a lump in her next. She past away because of it.    Cancer Maternal Aunt         Bone Cancer which took her life.    Hypertension Brother     Obesity Brother         Both of my siblings had the weight  lost surgery    Hypertension Sister     Heart Attack Father          at the age of 49       Past Medical History:   Diagnosis Date    Anxiety     Arthritis     Chronic back pain 2017    Had falls when I was younger. Was diagnosed with degenerated disk disease    Chronic obstructive pulmonary disease (HCC)     Diabetes (HCC)     Hearing loss 2019    My right ear will pop occasionally and when that happens I can hear better for a short time.    Hypertension     Obesity     Seizures (HCC)     Sleep apnea     Type 2 diabetes mellitus without complication (HCC)       Social History     Socioeconomic History    Marital status:      Spouse name: None    Number of children: None    Years of education: None    Highest education level: None   Tobacco Use    Smoking status: Former     Current packs/day: 1.00     Average packs/day: 1 pack/day for 10.0 years (10.0 ttl pk-yrs)     Types: Cigarettes    Smokeless tobacco: Former     Quit date: 3/17/2019    Tobacco comments:     Stopped then started again but now stopped for 4 years now.   Substance and Sexual Activity    Alcohol use: Not Currently     Comment: Drink 1 drink once in a while    Drug use: Never    Sexual activity: Not Currently     Partners: Male   Social History Narrative    Lives in [place]      Social Determinants of Health     Physical Activity: Inactive (2023)    Exercise Vital Sign     Days of Exercise per Week: 0 days     Minutes of Exercise per Session: 0 min

## 2024-05-21 ENCOUNTER — TELEPHONE (OUTPATIENT)
Age: 64
End: 2024-05-21

## 2024-05-21 LAB
EST. AVERAGE GLUCOSE BLD GHB EST-MCNC: 217 MG/DL
HBA1C MFR BLD: 9.2 % (ref 4–5.6)

## 2024-05-21 RX ORDER — METFORMIN HYDROCHLORIDE 500 MG/1
1000 TABLET, EXTENDED RELEASE ORAL 2 TIMES DAILY
Qty: 120 TABLET | Refills: 5 | Status: SHIPPED | OUTPATIENT
Start: 2024-05-21 | End: 2024-11-17

## 2024-05-23 ENCOUNTER — OFFICE VISIT (OUTPATIENT)
Age: 64
End: 2024-05-23

## 2024-05-23 DIAGNOSIS — E66.01 MORBID OBESITY (HCC): Primary | ICD-10-CM

## 2024-05-23 NOTE — PROGRESS NOTES
[facility]  Supervised Weight Loss     Date:   2024    Patient's Name: Alissa Fish  : 1960    Insurance:  Medicare          Session: 2 of 4  Surgery: Sleeve gastrectomy  Surgeon:  Dr. Paddy Maradiaga    Height: 63 inches Weight:    330      Lbs.   BMI: 58   Pounds Lost since last month: 5 lbs               Pounds Gained since last month: 0    Starting Weight: 335 lbs   Previous Month’s Weight: 335 lbs  Overall Pounds Lost: 5 lbs            Overall Pounds Gained: 0    Other Pertinent Information: Today's appointment was completed in a virtual setting. Surgeon recommending 10 lb weight loss before surgery.     Smoking Status:  None  Alcohol Intake: None    I have reviewed with pt the guidelines of the supervised wt loss program.  Pt understands the expectations of some wt loss during the program and that wt gain could delay the process. I have also explained that appointments need to be consecutive and missing an appointment may result in starting over. Pt has received this information in writing.          Changes that patient has made since last month include:  more conscious of food choices, eating consistent meals, less diet soda, smaller portions at meals. A1c reduced to 9.2        Eating Habits and Behaviors  A nutrition lesson was presented on label reading with specific guidelines provided for limiting added sugars. This information will help increase healthy food choices, promote weight loss and prevent dumping syndrome after gastric bypass. We also reviewed the general nutrition guidelines for bariatric surgery.                        Patient's current diet habits include: Pt is eating 3 meals per day (B: protein shake-Premier; L: 1/2 sandwich or salad or cottage cheese; D: protein, veggies-2). Snack choices include pbutter crax. Pt is eating refined carbohydrate foods (bread, pasta, rice, potatoes) occasionally. Pt is eating sweets/desserts rarely.

## 2024-06-05 ENCOUNTER — HOSPITAL ENCOUNTER (OUTPATIENT)
Facility: HOSPITAL | Age: 64
Discharge: HOME | End: 2024-06-08
Attending: SURGERY
Payer: MEDICARE

## 2024-06-05 DIAGNOSIS — K21.9 GASTROESOPHAGEAL REFLUX DISEASE WITHOUT ESOPHAGITIS: ICD-10-CM

## 2024-06-05 PROCEDURE — 74240 X-RAY XM UPR GI TRC 1CNTRST: CPT

## 2024-06-11 ENCOUNTER — OFFICE VISIT (OUTPATIENT)
Age: 64
End: 2024-06-11
Payer: MEDICARE

## 2024-06-11 VITALS
HEIGHT: 63 IN | DIASTOLIC BLOOD PRESSURE: 80 MMHG | OXYGEN SATURATION: 96 % | HEART RATE: 60 BPM | SYSTOLIC BLOOD PRESSURE: 150 MMHG | BODY MASS INDEX: 51.91 KG/M2 | TEMPERATURE: 98.3 F | WEIGHT: 293 LBS

## 2024-06-11 DIAGNOSIS — Z99.81 OXYGEN DEPENDENT: ICD-10-CM

## 2024-06-11 DIAGNOSIS — E66.01 OBESITY, MORBID (HCC): Primary | ICD-10-CM

## 2024-06-11 DIAGNOSIS — J44.9 OSA AND COPD OVERLAP SYNDROME (HCC): ICD-10-CM

## 2024-06-11 DIAGNOSIS — K21.9 GASTROESOPHAGEAL REFLUX DISEASE WITHOUT ESOPHAGITIS: ICD-10-CM

## 2024-06-11 DIAGNOSIS — G47.33 OSA AND COPD OVERLAP SYNDROME (HCC): ICD-10-CM

## 2024-06-11 DIAGNOSIS — E11.9 TYPE 2 DIABETES MELLITUS WITHOUT COMPLICATION, WITHOUT LONG-TERM CURRENT USE OF INSULIN (HCC): ICD-10-CM

## 2024-06-11 PROCEDURE — 3077F SYST BP >= 140 MM HG: CPT | Performed by: NURSE PRACTITIONER

## 2024-06-11 PROCEDURE — 3046F HEMOGLOBIN A1C LEVEL >9.0%: CPT | Performed by: NURSE PRACTITIONER

## 2024-06-11 PROCEDURE — 99214 OFFICE O/P EST MOD 30 MIN: CPT | Performed by: NURSE PRACTITIONER

## 2024-06-11 PROCEDURE — 3079F DIAST BP 80-89 MM HG: CPT | Performed by: NURSE PRACTITIONER

## 2024-06-11 RX ORDER — ERGOCALCIFEROL 1.25 MG/1
50000 CAPSULE ORAL WEEKLY
Qty: 12 CAPSULE | Refills: 1 | Status: SHIPPED | OUTPATIENT
Start: 2024-06-11

## 2024-06-11 ASSESSMENT — PATIENT HEALTH QUESTIONNAIRE - PHQ9
SUM OF ALL RESPONSES TO PHQ QUESTIONS 1-9: 0
SUM OF ALL RESPONSES TO PHQ QUESTIONS 1-9: 0
2. FEELING DOWN, DEPRESSED OR HOPELESS: NOT AT ALL
1. LITTLE INTEREST OR PLEASURE IN DOING THINGS: NOT AT ALL
SUM OF ALL RESPONSES TO PHQ QUESTIONS 1-9: 0
SUM OF ALL RESPONSES TO PHQ9 QUESTIONS 1 & 2: 0
SUM OF ALL RESPONSES TO PHQ QUESTIONS 1-9: 0

## 2024-06-11 ASSESSMENT — ENCOUNTER SYMPTOMS
SHORTNESS OF BREATH: 0
NAUSEA: 0
SINUS PAIN: 0
ABDOMINAL PAIN: 0
VOMITING: 0

## 2024-06-11 NOTE — PROGRESS NOTES
Chief Complaint   Patient presents with    Other     [facility]       Alissa Fish 1960 presents today for presurgical bariatric evaluation in preparation for:     Procedure Sleeve gastrectomy   Surgeon Dr. Paddy Maradiaga    Last Weight Metrics:      6/11/2024     2:20 PM 5/20/2024     3:02 PM 4/9/2024     3:02 PM 8/2/2023     2:28 PM 6/28/2023     3:30 PM 6/16/2023     2:18 PM 3/23/2023     2:50 PM   Weight Loss Metrics   Height 5' 3\" 5' 3\" 5' 3\" 5' 3\" 5' 3\" 5' 3\" 5' 3\"   Weight - Scale 325 lbs 10 oz 330 lbs 2 oz 335 lbs 8 oz 334 lbs 343 lbs 343 lbs 343 lbs   BMI (Calculated) 57.8 kg/m2 58.6 kg/m2 59.6 kg/m2 59.3 kg/m2 60.9 kg/m2 60.9 kg/m2 60.9 kg/m2       [x] Bariatric comorbidities present: hypertension, non-insulin dependent diabetes, and obstructive sleep apnea    [x] Ambulatory status: independent    [x] The patient's reported level of exercise: she is going to start riding the bike. .  Exercise: encouraged to perform at least 30 mins of at least moderate-intensity physical activity on 5 or more days a week. The activity can be undertaken in one session or several lasting 10 mins or more. Use of a wearable fitness device may help meet activity goals and was recommended.     [x] Nutrient screening with iron studies, B12, folic acid, and 25-vitamin D   Iron  65  B12/Folate   655    D   20.5  [] Sleep apnea screening  [] Sleep Medicine referral         [x] GI evaluation     Upper GI results IMPRESSION:  No evidence of hiatal hernia. Mild gastroesophageal reflux.    Upper endoscopy results - will need EGD     H. Pylori results No components found for: \"HPYALT\"  Negative     [] Endocrine evaluation (HgA1C <8% prior to surgery)    [facility] has been sent to Dr. Acevedo/Shruti     HgA1C  9.2    Date   5/20/204 down from 10.3 on 4/9/2024    TSH results    TSH (uIU/mL)   Date Value   04/09/2024 1.560         [x] Psychological Evaluation- done    [x] Nutrition Evaluation/Visits 2 of

## 2024-06-11 NOTE — PROGRESS NOTES
Identified pt with two pt identifiers (name and ). Reviewed chart in preparation for visit and have obtained necessary documentation.    Alissa Fish is a 63 y.o. female  Chief Complaint   Patient presents with    Other     midway bariatric     BP (!) 150/80   Pulse 60   Temp 98.3 °F (36.8 °C) (Oral)   Ht 1.6 m (5' 3\")   Wt (!) 147.7 kg (325 lb 9.6 oz)   SpO2 96%   BMI 57.68 kg/m²     1. Have you been to the ER, urgent care clinic since your last visit?  Hospitalized since your last visit?Patient and provider made aware of elevated BP x2. Patient asymptomatic. Patient reminded to monitor BP, continue to take BP medications if prescribed, and follow up with PCP/Cardiologist.  Patient expressed understanding and agreement.      2. Have you seen or consulted any other health care providers outside of the [facility] since your last visit?  Include any pap smears or colon screening. no

## 2024-06-13 ENCOUNTER — TELEPHONE (OUTPATIENT)
Age: 64
End: 2024-06-13

## 2024-06-18 ENCOUNTER — TELEPHONE (OUTPATIENT)
Age: 64
End: 2024-06-18

## 2024-06-20 ENCOUNTER — OFFICE VISIT (OUTPATIENT)
Age: 64
End: 2024-06-20

## 2024-06-20 ENCOUNTER — TELEPHONE (OUTPATIENT)
Age: 64
End: 2024-06-20

## 2024-06-20 DIAGNOSIS — E66.01 MORBID OBESITY (HCC): Primary | ICD-10-CM

## 2024-06-20 NOTE — PROGRESS NOTES
[facility] at [facility]  Supervised Weight Loss     Date:   2024    Patient's Name: Alissa Fish  : 1960    Insurance:  Medicare          Session: 3 of 4  Surgery: Sleeve gastrectomy                      Surgeon:  Dr. Paddy Maradiaga     Height: 63 inches       Weight:    325     Lbs (recent office wt).                                   BMI: 57             Pounds Lost since last month: 5 lbs               Pounds Gained since last month: 0     Starting Weight: 335 lbs                   Previous Month’s Weight: 330 lbs  Overall Pounds Lost: 10 lbs              Overall Pounds Gained: 0     Other Pertinent Information: Today's appointment was completed in a virtual setting. Surgeon recommending 10 lb weight loss before surgery.      Smoking Status:  using nicotene gum  Alcohol Intake: None    I have reviewed with pt the guidelines of the supervised wt loss program.  Pt understands the expectations of some wt loss during the program and that wt gain could delay the process. I have also explained that classes need to be consecutive.  Missing a class may result in starting over. Pt has received this information in writing.          Changes that patient has made since last month include:  reading food labels, started exercise.      Eating Habits and Behaviors  General healthy eating guidelines were discussed. A nutrition lesson specific to the importance of protein intake after surgery was provided. We discussed food sources of protein, protein supplements and multiple reasons as to why protein is important after bariatric surgery.  Pts were instructed to focus on including protein at every meal and practice eating protein first at the meal. Pts were encouraged to sample a protein shake for tolerance. Patients were also instructed to use the balanced plate method for help with portion control and general healthy eating prior to surgery. We discussed measuring meals to 1/2 cup

## 2024-06-20 NOTE — TELEPHONE ENCOUNTER
Spoke to patient to schedule her EGD with Dr Maradiaga at [facility].  I offered 7/3 @ 9:30am with arrival time of 8:30am and she declined stating she couldn't do that day.  I explained that he doesn't have set dates that I work with the director in [facility], that when she gives me other dates I will call her. She acknowledged ok and thank you

## 2024-06-24 ENCOUNTER — TELEPHONE (OUTPATIENT)
Age: 64
End: 2024-06-24

## 2024-06-24 DIAGNOSIS — I71.21 ANEURYSM OF ASCENDING AORTA WITHOUT RUPTURE (HCC): Primary | ICD-10-CM

## 2024-06-24 NOTE — TELEPHONE ENCOUNTER
Patient cancelled her echo for today, wanted to reschedule but the order will  on 24, will need a new order.      Pt# [phone]

## 2024-06-24 NOTE — TELEPHONE ENCOUNTER
New order for echo placed; rescheduled for 6/26 at 3:00 PM. Telephone call made to patient. Two patient identifiers verified.   Confirmed date and time with patient.

## 2024-06-26 ENCOUNTER — TELEPHONE (OUTPATIENT)
Age: 64
End: 2024-06-26

## 2024-06-26 ENCOUNTER — PREP FOR PROCEDURE (OUTPATIENT)
Age: 64
End: 2024-06-26

## 2024-06-26 ENCOUNTER — ANCILLARY PROCEDURE (OUTPATIENT)
Age: 64
End: 2024-06-26
Payer: MEDICARE

## 2024-06-26 VITALS
HEIGHT: 63 IN | BODY MASS INDEX: 51.91 KG/M2 | DIASTOLIC BLOOD PRESSURE: 80 MMHG | SYSTOLIC BLOOD PRESSURE: 150 MMHG | WEIGHT: 293 LBS

## 2024-06-26 DIAGNOSIS — I71.21 ANEURYSM OF ASCENDING AORTA WITHOUT RUPTURE (HCC): ICD-10-CM

## 2024-06-26 PROBLEM — K21.9 ESOPHAGEAL REFLUX: Status: ACTIVE | Noted: 2024-06-26

## 2024-06-26 LAB
ECHO AO ASC DIAM: 4 CM
ECHO AO ASCENDING AORTA INDEX: 1.68 CM/M2
ECHO AO ROOT DIAM: 3.4 CM
ECHO AO ROOT INDEX: 1.43 CM/M2
ECHO AV AREA PEAK VELOCITY: 3.2 CM2
ECHO AV AREA VTI: 3.1 CM2
ECHO AV AREA/BSA PEAK VELOCITY: 1.3 CM2/M2
ECHO AV AREA/BSA VTI: 1.3 CM2/M2
ECHO AV MEAN GRADIENT: 4 MMHG
ECHO AV MEAN VELOCITY: 0.9 M/S
ECHO AV PEAK GRADIENT: 7 MMHG
ECHO AV PEAK VELOCITY: 1.3 M/S
ECHO AV VELOCITY RATIO: 0.69
ECHO AV VTI: 28.1 CM
ECHO BSA: 2.56 M2
ECHO LA DIAMETER INDEX: 1.85 CM/M2
ECHO LA DIAMETER: 4.4 CM
ECHO LA TO AORTIC ROOT RATIO: 1.29
ECHO LA VOL A-L A2C: 70 ML (ref 22–52)
ECHO LA VOL A-L A4C: 70 ML (ref 22–52)
ECHO LA VOL BP: 69 ML (ref 22–52)
ECHO LA VOL MOD A2C: 68 ML (ref 22–52)
ECHO LA VOL MOD A4C: 68 ML (ref 22–52)
ECHO LA VOL/BSA BIPLANE: 29 ML/M2 (ref 16–34)
ECHO LA VOLUME AREA LENGTH: 72 ML
ECHO LA VOLUME INDEX A-L A2C: 29 ML/M2 (ref 16–34)
ECHO LA VOLUME INDEX A-L A4C: 29 ML/M2 (ref 16–34)
ECHO LA VOLUME INDEX AREA LENGTH: 30 ML/M2 (ref 16–34)
ECHO LA VOLUME INDEX MOD A2C: 29 ML/M2 (ref 16–34)
ECHO LA VOLUME INDEX MOD A4C: 29 ML/M2 (ref 16–34)
ECHO LV E' LATERAL VELOCITY: 9 CM/S
ECHO LV E' SEPTAL VELOCITY: 6 CM/S
ECHO LV EDV A2C: 74 ML
ECHO LV EDV A4C: 122 ML
ECHO LV EDV BP: 103 ML (ref 56–104)
ECHO LV EDV INDEX A4C: 51 ML/M2
ECHO LV EDV INDEX BP: 43 ML/M2
ECHO LV EDV NDEX A2C: 31 ML/M2
ECHO LV EJECTION FRACTION A2C: 68 %
ECHO LV EJECTION FRACTION A4C: 63 %
ECHO LV EJECTION FRACTION BIPLANE: 64 % (ref 55–100)
ECHO LV ESV A2C: 23 ML
ECHO LV ESV A4C: 45 ML
ECHO LV ESV BP: 37 ML (ref 19–49)
ECHO LV ESV INDEX A2C: 10 ML/M2
ECHO LV ESV INDEX A4C: 19 ML/M2
ECHO LV ESV INDEX BP: 16 ML/M2
ECHO LV FRACTIONAL SHORTENING: 37 % (ref 28–44)
ECHO LV INTERNAL DIMENSION DIASTOLE INDEX: 2.18 CM/M2
ECHO LV INTERNAL DIMENSION DIASTOLIC: 5.2 CM (ref 3.9–5.3)
ECHO LV INTERNAL DIMENSION SYSTOLIC INDEX: 1.39 CM/M2
ECHO LV INTERNAL DIMENSION SYSTOLIC: 3.3 CM
ECHO LV IVSD: 1.2 CM (ref 0.6–0.9)
ECHO LV MASS 2D: 248.8 G (ref 67–162)
ECHO LV MASS INDEX 2D: 104.6 G/M2 (ref 43–95)
ECHO LV POSTERIOR WALL DIASTOLIC: 1.2 CM (ref 0.6–0.9)
ECHO LV RELATIVE WALL THICKNESS RATIO: 0.46
ECHO LVOT AREA: 4.5 CM2
ECHO LVOT AV VTI INDEX: 0.68
ECHO LVOT DIAM: 2.4 CM
ECHO LVOT MEAN GRADIENT: 2 MMHG
ECHO LVOT PEAK GRADIENT: 3 MMHG
ECHO LVOT PEAK VELOCITY: 0.9 M/S
ECHO LVOT STROKE VOLUME INDEX: 36.1 ML/M2
ECHO LVOT SV: 85.9 ML
ECHO LVOT VTI: 19 CM
ECHO MV A VELOCITY: 0.82 M/S
ECHO MV E DECELERATION TIME (DT): 301.4 MS
ECHO MV E VELOCITY: 0.66 M/S
ECHO MV E/A RATIO: 0.8
ECHO MV E/E' LATERAL: 7.33
ECHO MV E/E' RATIO (AVERAGED): 9.17
ECHO MV E/E' SEPTAL: 11
ECHO PV MAX VELOCITY: 1.1 M/S
ECHO PV PEAK GRADIENT: 5 MMHG
ECHO RV INTERNAL DIMENSION: 3.8 CM
ECHO RV TAPSE: 1.9 CM (ref 1.7–?)
ECHO RVOT PEAK GRADIENT: 3 MMHG
ECHO RVOT PEAK VELOCITY: 0.9 M/S

## 2024-06-26 PROCEDURE — 93306 TTE W/DOPPLER COMPLETE: CPT | Performed by: INTERNAL MEDICINE

## 2024-06-26 NOTE — TELEPHONE ENCOUNTER
Spoke to patient to schedule her EGD with Dr Maradiaga at [facility].  I offered 7/19 @ 7:30am with arrival time of 6:30am and she accepted.    I let the patient know they are required to bring someone with them that will be responsible to take them home along with their photo ID and insurance card.      If you are taking any weight lose medication, you need to stop one week before procedure                            Trulicity (dulaglutide)                          Wegovy (Semaglutide)                          Ozempic (Semaglutide)                          Rybelsus (tirzepatide)                          Mounjaro (tirzepatide)                           Zepbound (tirzepatide)        I let them know once this is scheduled I will put the information in a letter along with where they need to go and pre-procedure instructions.   This letter will post to their my chart and go out in the mail.  She acknowledged thank you

## 2024-06-27 ENCOUNTER — TELEPHONE (OUTPATIENT)
Age: 64
End: 2024-06-27

## 2024-06-27 NOTE — TELEPHONE ENCOUNTER
Telephone call made to patient. Two patient identifiers verified.   Went over results with patient. Verified understanding. All questions answered.     Future Appointments   Date Time Provider Department Center   7/2/2024  9:40 AM Compa Polo MD [facility] AMB   7/17/2024  1:30 PM Gwendolyn Abraham RD [facility] AMB

## 2024-06-27 NOTE — TELEPHONE ENCOUNTER
----- Message from Compa Polo MD sent at 6/26/2024  4:53 PM EDT -----  Please let pt know echo was unchanged with normal EF. Mildly dilated aorta at 4 cm. Can keep scheduled follow up. thx

## 2024-07-02 ENCOUNTER — TELEPHONE (OUTPATIENT)
Age: 64
End: 2024-07-02

## 2024-07-02 NOTE — TELEPHONE ENCOUNTER
Telephone call made to patient. Two patient identifiers verified.   The patient hurt her hip and was looking for a vv. I let her know Dr. Polo doesn't have anything available until August. She rescheduled in person in August.   Future Appointments   Date Time Provider Department Center   7/17/2024  1:30 PM Gwendolyn Abraham RD [facility] AMB   8/20/2024  1:40 PM Compa Polo MD [facility] AMB

## 2024-07-08 ENCOUNTER — TELEPHONE (OUTPATIENT)
Age: 64
End: 2024-07-08

## 2024-07-08 NOTE — TELEPHONE ENCOUNTER
Attempted to call patient regarding [facility] insurance requirements. Phone busy and unable to leave message.     -psych pending  -finish nutrition  -Cardiac clearance   -Pulmonary clearance

## 2024-07-17 ENCOUNTER — OFFICE VISIT (OUTPATIENT)
Age: 64
End: 2024-07-17

## 2024-07-17 DIAGNOSIS — E66.01 MORBID OBESITY (HCC): Primary | ICD-10-CM

## 2024-07-17 NOTE — PROGRESS NOTES
prevent dehydration post-operatively.                       Patient's current diet habits include: Pt is eating 3 meals per day (B: protein shake-Premier or 2 eggs, toast; L: 1/2 sandwich or salad or cottage cheese; D: protein, veggies-2). Snack choices include granola bar . Pt is eating refined carbohydrate foods (bread, pasta, rice, potatoes) occasionally. Pt is eating sweets/desserts rarely. Pt is using healthy cooking methods. Pt is eating meals prepared outside of the home 2x week. Pt is drinking water, unsweet tea, diet soda occasionally. Pt reports no emotional eating.       Physical Activity/Exercise  We talked about the importance of increasing daily physical activity and beginning to develop an exercise regimen/routine. We talked about exercise as being an important part of long term weight loss after surgery.     Comments:  During class, I discussed with patient the importance of getting into an exercise routine.  Pt is currently riding stationary bike and walking in place for activity.  Pt has been encouraged to continue with current exercise-increase as tolerated.    Behavior Modification       We talked about how to eat more mindfully and identify emotional eating triggers. Tips and recommendations for how to make these changes were provided. Pt was encouraged to keep a food journal and record what they were taking in daily.     Reviewed vitamins and reviewed all nutrition guidelines    Overall Assessment: Pt demonstrates appropriate lifestyle changes evidenced by reported changes and mostly weight loss over the past 4 months. We reviewed vitamin recommendations today with option of choosing [employer], Bariatric Pal or [employer] all of which provide a 1 per day bariatric multivitamin and taking [id number] mg calcium citrate separate. Pt demonstrates understanding of nutrition guidelines for bariatric surgery. Appears to be an appropriate candidate at this time    Patient-Set Goals:   1. Nutrition - 
no

## 2024-07-19 ENCOUNTER — HOSPITAL ENCOUNTER (OUTPATIENT)
Facility: HOSPITAL | Age: 64
Setting detail: OUTPATIENT SURGERY
Discharge: HOME | End: 2024-07-19
Attending: SURGERY | Admitting: SURGERY
Payer: MEDICARE

## 2024-07-19 ENCOUNTER — ANESTHESIA (OUTPATIENT)
Facility: HOSPITAL | Age: 64
End: 2024-07-19
Payer: MEDICARE

## 2024-07-19 ENCOUNTER — TELEPHONE (OUTPATIENT)
Age: 64
End: 2024-07-19

## 2024-07-19 ENCOUNTER — ANESTHESIA EVENT (OUTPATIENT)
Facility: HOSPITAL | Age: 64
End: 2024-07-19
Payer: MEDICARE

## 2024-07-19 VITALS
RESPIRATION RATE: 20 BRPM | BODY MASS INDEX: 51.91 KG/M2 | HEIGHT: 63 IN | HEART RATE: 80 BPM | WEIGHT: 293 LBS | OXYGEN SATURATION: 96 % | TEMPERATURE: 97.3 F | DIASTOLIC BLOOD PRESSURE: 87 MMHG | SYSTOLIC BLOOD PRESSURE: 152 MMHG

## 2024-07-19 PROCEDURE — 7100000010 HC PHASE II RECOVERY - FIRST 15 MIN: Performed by: SURGERY

## 2024-07-19 PROCEDURE — 43239 EGD BIOPSY SINGLE/MULTIPLE: CPT | Performed by: SURGERY

## 2024-07-19 PROCEDURE — 88305 TISSUE EXAM BY PATHOLOGIST: CPT

## 2024-07-19 PROCEDURE — 2720000010 HC SURG SUPPLY STERILE: Performed by: SURGERY

## 2024-07-19 PROCEDURE — 7100000011 HC PHASE II RECOVERY - ADDTL 15 MIN: Performed by: SURGERY

## 2024-07-19 PROCEDURE — 3700000000 HC ANESTHESIA ATTENDED CARE: Performed by: SURGERY

## 2024-07-19 PROCEDURE — 3600007501: Performed by: SURGERY

## 2024-07-19 PROCEDURE — 2580000003 HC RX 258: Performed by: SURGERY

## 2024-07-19 PROCEDURE — 2709999900 HC NON-CHARGEABLE SUPPLY: Performed by: SURGERY

## 2024-07-19 PROCEDURE — 6360000002 HC RX W HCPCS: Performed by: NURSE ANESTHETIST, CERTIFIED REGISTERED

## 2024-07-19 RX ORDER — SODIUM CHLORIDE 9 MG/ML
25 INJECTION, SOLUTION INTRAVENOUS PRN
Status: DISCONTINUED | OUTPATIENT
Start: 2024-07-19 | End: 2024-07-19 | Stop reason: HOSPADM

## 2024-07-19 RX ORDER — SODIUM CHLORIDE 0.9 % (FLUSH) 0.9 %
5-40 SYRINGE (ML) INJECTION EVERY 12 HOURS SCHEDULED
Status: DISCONTINUED | OUTPATIENT
Start: 2024-07-19 | End: 2024-07-19 | Stop reason: HOSPADM

## 2024-07-19 RX ORDER — SODIUM CHLORIDE 0.9 % (FLUSH) 0.9 %
5-40 SYRINGE (ML) INJECTION PRN
Status: DISCONTINUED | OUTPATIENT
Start: 2024-07-19 | End: 2024-07-19 | Stop reason: HOSPADM

## 2024-07-19 RX ADMIN — PROPOFOL 50 MG: 10 INJECTION, EMULSION INTRAVENOUS at 07:39

## 2024-07-19 RX ADMIN — SODIUM CHLORIDE: 9 INJECTION, SOLUTION INTRAVENOUS at 07:24

## 2024-07-19 RX ADMIN — PROPOFOL 50 MG: 10 INJECTION, EMULSION INTRAVENOUS at 07:47

## 2024-07-19 RX ADMIN — PROPOFOL 50 MG: 10 INJECTION, EMULSION INTRAVENOUS at 07:43

## 2024-07-19 ASSESSMENT — PAIN - FUNCTIONAL ASSESSMENT: PAIN_FUNCTIONAL_ASSESSMENT: NONE - DENIES PAIN

## 2024-07-19 ASSESSMENT — COPD QUESTIONNAIRES: CAT_SEVERITY: SEVERE

## 2024-07-19 NOTE — H&P
Subjective:      Alissa Fish is a 63 y.o. female with morbid obesity, in work-up for sleeve gastrectomy. She has GERD and UGI reveals spontaneous reflux. She is O2 dependent.    Past Medical History:   Diagnosis Date    Anxiety 2019    Arthritis     Chronic back pain 2017    Had falls when I was younger. Was diagnosed with degenerated disk disease    Chronic obstructive pulmonary disease ([facility])     on oxygen 24 hours a day    Diabetes ([facility])     Hearing loss 2019    My right ear will pop occasionally and when that happens I can hear better for a short time.    Hypertension     Obesity     Seizures ([facility])     Sleep apnea     Type 2 diabetes mellitus without complication ([facility])      Patient Active Problem List    Diagnosis Date Noted    Esophageal reflux 06/26/2024    Gastroesophageal reflux disease without esophagitis 04/09/2024    Chronic venous insufficiency 04/09/2024    JUVENAL on CPAP 09/18/2019    Obesity, morbid ([facility]) 09/18/2019    JUVENAL and COPD overlap syndrome ([facility]) 09/18/2019    Elevated blood pressure reading in office with diagnosis of hypertension 09/18/2019    Acute on chronic respiratory failure with hypoxia and hypercapnia ([facility]) 09/18/2019    Type 2 diabetes mellitus without complication, without long-term current use of insulin ([facility]) 09/18/2019    Chronic obstructive pulmonary disease ([facility]) 09/18/2019    Edema 09/18/2019    Chronic bilateral low back pain 09/18/2019    VT (ventricular tachycardia) ([facility]) 09/05/2019    Elevated serum creatinine 09/05/2019    Seizure ([facility]) 09/05/2019    Left-sided weakness 09/05/2019    Altered mental status 09/05/2019     Past Surgical History:   Procedure Laterality Date    DILATION AND CURETTAGE OF UTERUS      DILATION AND CURETTAGE OF UTERUS       Family History   Problem Relation Age of Onset    Breast Cancer Mother         Breast and then a lump in her next. She past away because of it.    Cancer Maternal Aunt         Bone Cancer which took her life.    Hypertension

## 2024-07-19 NOTE — TELEPHONE ENCOUNTER
Identified patient with two patient identifiers (name and ). Reviewed chart in preparation for encounter and have obtained necessary documentation.    Called and spoke with patient, she has a cardiac appt on 24 and pulmonary on 24. She will then have the clearances faxed to us. Patient understood what was discussed and thankful for the call back.

## 2024-07-19 NOTE — TELEPHONE ENCOUNTER
Attempted to call patient regarding [facility] insurance requirements, LVM to return call.     -cardiac clearance  -pulmonology clearance.

## 2024-07-19 NOTE — ANESTHESIA PRE PROCEDURE
Department of Anesthesiology  Preprocedure Note       Name:  Alissa Fish   Age:  63 y.o.  :  1960                                          MRN:  [id number]         Date:  2024      Surgeon: Surgeon(s):  Paddy Maradiaga MD    Procedure: Procedure(s):  ESOPHAGOGASTRODUODENOSCOPY    Medications prior to admission:   Prior to Admission medications    Medication Sig Start Date End Date Taking? Authorizing Provider   vitamin D (ERGOCALCIFEROL) 1.25 MG (31337 UT) CAPS capsule Take 1 capsule by mouth once a week  Patient not taking: Reported on 2024   Kiarra Echavarria, APRN - NP   metFORMIN (GLUCOPHAGE-XR) 500 MG extended release tablet Take 2 tablets by mouth in the morning and at bedtime 24  August Madison MD   carvedilol (COREG) 12.5 MG tablet TAKE 1 TABLET BY MOUTH TWICE DAILY FOR HIGH BLOOD PRESSURE ,  APPOINTMENT  NEEDED  FOR  FURTHER  REFILLS 3/14/24   Compa Polo MD   losartan (COZAAR) 100 MG tablet Take 1 tablet by mouth daily 24   Provider, MD Duane   blood glucose test strips (ASCENSIA AUTODISC VI;ONE TOUCH ULTRA TEST VI) strip 1 each by In Vitro route daily As needed. 24   August Madison MD   OneTouch Delica Lancets 30G MISC 1 Pen Needle by Does not apply route in the morning, at noon, and at bedtime 24   August Madison MD   OXYGEN 2 liters via NC continuously.    Automatic Reconciliation, Ar   albuterol sulfate HFA (PROVENTIL;VENTOLIN;PROAIR) 108 (90 Base) MCG/ACT inhaler Inhale 1 puff into the lungs every 6 hours as needed    Automatic Reconciliation, Ar   albuterol (PROVENTIL) (2.5 MG/3ML) 0.083% nebulizer solution daily as needed  Patient not taking: Reported on 2024   Automatic Reconciliation, Ar   aspirin 81 MG chewable tablet Take 1 tablet by mouth daily 19   Automatic Reconciliation, Ar   fluticasone propionate (FLOVENT DISKUS) 250 MCG/ACT inhaler Inhale 1 puff into the lungs 2 times daily    Automatic Reconciliation, Ar

## 2024-07-19 NOTE — DISCHARGE INSTRUCTIONS
Discharge Instructions for Endoscopy Patients       Diagnosis: GERD with esophagitis      Plan: Continue pre-op bariatric requirements      Do not drive or operate machinery while taking sedating or narcotic medications.     Some discomfort is expected in your throat or upper abdomen. Take tylenol as needed.    If an acid monitoring device was deployed, please follow the instructions for recording and returning the device.    You may walk as desired and go up and down stairs as needed. Walking is encouraged.    You may shower like normal    You may resume regular diet.    Follow up with provider as scheduled.    If you experience fever (greater than 101.5), chills, vomiting, please contact your surgeon’s office.    If you have further questions or concerns, please call your surgeon’s office at [phone].      Future Appointments   Date Time Provider Department Center   7/24/2024  9:15 AM August Madison MD [facility]IN BS AMB   8/20/2024  1:40 PM [facility]

## 2024-07-19 NOTE — ANESTHESIA POSTPROCEDURE EVALUATION
Department of Anesthesiology  Postprocedure Note    Patient: Alissa Fish  MRN: [id number]  YOB: 1960  Date of evaluation: 7/19/2024    Procedure Summary       Date: 07/19/24 Room / Location: [facility] ENDO 04 / [facility] ENDOSCOPY    Anesthesia Start: 0735 Anesthesia Stop: 0756    Procedure: ESOPHAGOGASTRODUODENOSCOPY (Upper GI Region) Diagnosis:       Esophageal reflux      (Esophageal reflux [K21.9])    Surgeons: Paddy Maradiaga MD Responsible Provider: John Bravo MD    Anesthesia Type: MAC ASA Status: 3            Anesthesia Type: MAC    Geronimo Phase I: Geronimo Score: 9    Geronimo Phase II: Geronimo Score: 10    Anesthesia Post Evaluation    Patient location during evaluation: bedside  Nausea & Vomiting: no nausea  Cardiovascular status: blood pressure returned to baseline  Respiratory status: acceptable  Hydration status: euvolemic    No notable events documented.

## 2024-07-19 NOTE — OP NOTE
[facility]   Endoscopic Procedure Note        NAME:  Alissa Fish   :   1960   MRN:   [id number]     Date/Time:  2024 7:52 AM    Esophagogastroduodenoscopy (EGD) Procedure Note    Preoperative Diagnosis: Esophageal reflux [K21.9]  Postoperative Diagnosis: Post-Op Diagnosis Codes:     * Esophageal reflux [K21.9]; hiatal hernia; esophagitis       Surgeon:  Paddy Maradiaga MD    Staff: Circulator: Lia Mora RN  Endoscopy Technician: Jamila Randhawa     Implants: None    Anethesia/Sedation:  MAC anesthesia Propofol    Procedure Details     After infom consent was obtained for the procedure, with all risks and benefits of procedure explained the patient was taken to the endoscopy suite and placed in the left lateral decubitus position.  Following sequential administration of sedation as per above, the GIF-H190 gastroscope was inserted into the mouth and advanced under direct vision to third portion of the duodenum.  A careful inspection was made as the gastroscope was withdrawn, including a retroflexed view of the proximal stomach; findings and interventions are described below.      Findings:  Esophagus: esophagitis, Grade B, GEJ at 36 cm, 2 cm hiatal hernia  Stomach: normal, without bile; Hill Grade 2 valve  Duodenum/jejunum: Normal       Therapies: None    Specimens: GE junction mucosa           EBL: Minimal    Complications:   None; patient tolerated the procedure well.           Impression:    See Postoperative diagnosis above    Recommendations:  Continue current medications; complete bariatric surgery insurance requirements.    Discharge disposition:  Home in the company of  when able to ambulate    Paddy Maradiaga MD, FACS  Bariatric and General Surgeon  [facility]      Electronically signed by Paddy Maradiaga MD on 2024 at 7:52 AM

## 2024-07-19 NOTE — PERIOP NOTE

## 2024-07-24 ENCOUNTER — OFFICE VISIT (OUTPATIENT)
Facility: CLINIC | Age: 64
End: 2024-07-24
Payer: MEDICARE

## 2024-07-24 VITALS
OXYGEN SATURATION: 96 % | SYSTOLIC BLOOD PRESSURE: 132 MMHG | RESPIRATION RATE: 15 BRPM | WEIGHT: 293 LBS | TEMPERATURE: 98.4 F | DIASTOLIC BLOOD PRESSURE: 65 MMHG | HEART RATE: 62 BPM | BODY MASS INDEX: 56.69 KG/M2

## 2024-07-24 DIAGNOSIS — K08.109 EDENTULISM: Primary | ICD-10-CM

## 2024-07-24 DIAGNOSIS — Z12.11 SCREENING FOR COLORECTAL CANCER: ICD-10-CM

## 2024-07-24 DIAGNOSIS — Z01.818 PRE-OP EVALUATION: ICD-10-CM

## 2024-07-24 DIAGNOSIS — E11.9 CONTROLLED TYPE 2 DIABETES MELLITUS WITHOUT COMPLICATION, WITHOUT LONG-TERM CURRENT USE OF INSULIN (HCC): ICD-10-CM

## 2024-07-24 DIAGNOSIS — Z12.12 SCREENING FOR COLORECTAL CANCER: ICD-10-CM

## 2024-07-24 PROCEDURE — 3075F SYST BP GE 130 - 139MM HG: CPT | Performed by: FAMILY MEDICINE

## 2024-07-24 PROCEDURE — 3078F DIAST BP <80 MM HG: CPT | Performed by: FAMILY MEDICINE

## 2024-07-24 PROCEDURE — 3046F HEMOGLOBIN A1C LEVEL >9.0%: CPT | Performed by: FAMILY MEDICINE

## 2024-07-24 PROCEDURE — 99214 OFFICE O/P EST MOD 30 MIN: CPT | Performed by: FAMILY MEDICINE

## 2024-07-24 NOTE — PROGRESS NOTES
Chief Complaint   Patient presents with    Pre-op Exam     Patient is here for a Pre OP.      she is a 63 y.o. year old female who presents for evaluation of Dental surgery   Preoperative Evaluation    Date of Exam: 7/24/2024    Alissa Fish is a 63 y.o. female who presents for preoperative evaluation.   Procedure/Surgery:Implants placement with extract  Date of Procedure/Surgery: pending  Surgeon: [facility] Facility:  [facility]  Primary Physician: Dr. Madison  Latex Allergy: Yes    Problem List:     Patient Active Problem List   Diagnosis    VT (ventricular tachycardia) (HCC)    JUVENAL on CPAP    Obesity, morbid (HCC)    JUVENAL and COPD overlap syndrome (HCC)    Elevated serum creatinine    Seizure (HCC)    Elevated blood pressure reading in office with diagnosis of hypertension    Acute on chronic respiratory failure with hypoxia and hypercapnia (HCC)    Type 2 diabetes mellitus without complication, without long-term current use of insulin (HCC)    Chronic obstructive pulmonary disease (HCC)    Edema    Left-sided weakness    Altered mental status    Chronic bilateral low back pain    Gastroesophageal reflux disease without esophagitis    Chronic venous insufficiency    Esophageal reflux     Medical History:     Past Medical History:   Diagnosis Date    Anxiety 2019    Arthritis     Chronic back pain 2017    Had falls when I was younger. Was diagnosed with degenerated disk disease    COPD (severe) home O2 (24hrs)     on oxygen 24 hours a day    Diabetes (HCC)     Hearing loss 2019    My right ear will pop occasionally and when that happens I can hear better for a short time.    Hypertension     Morbid Obesity     Seizures (HCC)     Sleep apnea     Type 2 diabetes      Allergies:     Allergies   Allergen Reactions    Penicillins      Other reaction(s): Unknown (comments)     Medications:     Current Outpatient Medications   Medication Sig    vitamin D (ERGOCALCIFEROL) 1.25 MG (30988 UT) CAPS capsule

## 2024-07-25 LAB
ALBUMIN SERPL-MCNC: 3.6 G/DL (ref 3.5–5)
ALBUMIN/GLOB SERPL: 1 (ref 1.1–2.2)
ALP SERPL-CCNC: 87 U/L (ref 45–117)
ALT SERPL-CCNC: 21 U/L (ref 12–78)
ANION GAP SERPL CALC-SCNC: 4 MMOL/L (ref 5–15)
AST SERPL-CCNC: 15 U/L (ref 15–37)
BILIRUB SERPL-MCNC: 0.6 MG/DL (ref 0.2–1)
BUN SERPL-MCNC: 16 MG/DL (ref 6–20)
BUN/CREAT SERPL: 16 (ref 12–20)
CALCIUM SERPL-MCNC: 9.6 MG/DL (ref 8.5–10.1)
CHLORIDE SERPL-SCNC: 102 MMOL/L (ref 97–108)
CHOLEST SERPL-MCNC: 167 MG/DL
CO2 SERPL-SCNC: 32 MMOL/L (ref 21–32)
CREAT SERPL-MCNC: 0.97 MG/DL (ref 0.55–1.02)
CREAT UR-MCNC: 50.7 MG/DL
EST. AVERAGE GLUCOSE BLD GHB EST-MCNC: 183 MG/DL
GLOBULIN SER CALC-MCNC: 3.6 G/DL (ref 2–4)
GLUCOSE SERPL-MCNC: 232 MG/DL (ref 65–100)
HBA1C MFR BLD: 8 % (ref 4–5.6)
HDLC SERPL-MCNC: 42 MG/DL
HDLC SERPL: 4 (ref 0–5)
LDLC SERPL CALC-MCNC: 94.2 MG/DL (ref 0–100)
MICROALBUMIN UR-MCNC: 1.12 MG/DL
MICROALBUMIN/CREAT UR-RTO: 22 MG/G (ref 0–30)
POTASSIUM SERPL-SCNC: 4.7 MMOL/L (ref 3.5–5.1)
PROT SERPL-MCNC: 7.2 G/DL (ref 6.4–8.2)
SODIUM SERPL-SCNC: 138 MMOL/L (ref 136–145)
TRIGL SERPL-MCNC: 154 MG/DL
VLDLC SERPL CALC-MCNC: 30.8 MG/DL

## 2024-08-08 LAB — NONINV COLON CA DNA+OCC BLD SCRN STL QL: NEGATIVE

## 2024-08-20 ENCOUNTER — OFFICE VISIT (OUTPATIENT)
Age: 64
End: 2024-08-20
Payer: MEDICARE

## 2024-08-20 VITALS
BODY MASS INDEX: 51.91 KG/M2 | HEIGHT: 63 IN | HEART RATE: 62 BPM | SYSTOLIC BLOOD PRESSURE: 130 MMHG | OXYGEN SATURATION: 94 % | DIASTOLIC BLOOD PRESSURE: 60 MMHG | WEIGHT: 293 LBS

## 2024-08-20 DIAGNOSIS — Z01.810 PREOP CARDIOVASCULAR EXAM: ICD-10-CM

## 2024-08-20 DIAGNOSIS — J44.9 CHRONIC OBSTRUCTIVE PULMONARY DISEASE, UNSPECIFIED COPD TYPE (HCC): ICD-10-CM

## 2024-08-20 DIAGNOSIS — I71.21 ANEURYSM OF ASCENDING AORTA WITHOUT RUPTURE (HCC): Primary | ICD-10-CM

## 2024-08-20 DIAGNOSIS — R60.0 BILATERAL LOWER EXTREMITY EDEMA: ICD-10-CM

## 2024-08-20 PROCEDURE — 93005 ELECTROCARDIOGRAM TRACING: CPT | Performed by: INTERNAL MEDICINE

## 2024-08-20 PROCEDURE — 99204 OFFICE O/P NEW MOD 45 MIN: CPT | Performed by: INTERNAL MEDICINE

## 2024-08-20 RX ORDER — CARVEDILOL 12.5 MG/1
12.5 TABLET ORAL 2 TIMES DAILY WITH MEALS
Qty: 180 TABLET | Refills: 3 | Status: SHIPPED | OUTPATIENT
Start: 2024-08-20

## 2024-08-20 RX ORDER — LOSARTAN POTASSIUM 100 MG/1
100 TABLET ORAL DAILY
Qty: 90 TABLET | Refills: 3 | Status: SHIPPED | OUTPATIENT
Start: 2024-08-20

## 2024-08-20 RX ORDER — FUROSEMIDE 40 MG/1
40 TABLET ORAL DAILY
Qty: 90 TABLET | Refills: 1 | Status: SHIPPED | OUTPATIENT
Start: 2024-08-20

## 2024-08-20 RX ORDER — MULTIVIT-MIN/FERROUS GLUCONATE 9 MG/15 ML
15 LIQUID (ML) ORAL DAILY
COMMUNITY

## 2024-08-20 NOTE — PROGRESS NOTES
[place]: Polo  [phone]    History of Present Illness:  Ms. Fish is 64 yo F pt followed by Dr. Fernandez, history of ventricular tachycardia, subsequent cardiac catheterization in 2019 demonstrated no significant CAD and echocardiogram was normal, COPD, morbid obesity.      Since last visit overall she has been okay cardiac-wise.  She is getting a workup and needs preop cardiac evaluation for sleeve gastrectomy with Dr. Maradiaga. She did have an EGD done in July that demonstrated hiatal hernia, esophagitis at the GE junction.  From a symptom standpoint, she does have baseline shortness of breath, but is unchanged.  No exertional chest pain.  No significant palpitations.  Her weight is down from 343 to 311 pounds.  She is seeing Dr. Maradiaga for consideration of weight loss surgery.  She sees Dr. Callahan for oxygen dependent COPD.   She is compensated on exam with clear lungs and she does have chronic lower extremity edema/lymphedema.  She says she was previously on a diuretic, but does not have a prescription anymore and will go ahead and give her a prescription.    Assessment/Plan:  1. Preop cardiac evaluation. She is stable cardiac-wise and low risk for cardiac complications for upcoming surgery.  Cardiac cath in 2019 demonstrated no significant CAD and her echo this year demonstrated preserved LV function. Will send a report to Dr. Maradiaga. She is going to see her pulmonologist within the next week or two to obtain clearance from a pulmonary standpoint.  2. Ascending aortic aneurysm.  Mildly dilated at 4 cm.  Will have her follow up with same day echo in one year.  3. Lower extremity edema.  Consistent with lymphedema/venous insufficiency.  Gave her a script for diuretic. Otherwise elevate legs at rest, minimize salt intake, compression stockings prn.  Hopefully will improve with weight loss as well.  4. Morbid obesity.  Working on diet and exercise.  Plan is for gastric weight loss surgery.  5. Essential

## 2024-08-20 NOTE — PROGRESS NOTES
Chief Complaint   Patient presents with    Annual Exam     Vitals:    08/20/24 1343   BP: 130/60   Site: Left Upper Arm   Position: Sitting   Cuff Size: Medium Adult   Pulse: 62   SpO2: 94%   Weight: (!) 141.1 kg (311 lb)   Height: 1.6 m (5' 3\")      /60 (Site: Left Upper Arm, Position: Sitting, Cuff Size: Medium Adult)   Pulse 62   Ht 1.6 m (5' 3\")   Wt (!) 141.1 kg (311 lb)   SpO2 94%   BMI 55.09 kg/m²

## 2024-09-06 ENCOUNTER — TELEPHONE (OUTPATIENT)
Age: 64
End: 2024-09-06

## 2024-09-06 NOTE — TELEPHONE ENCOUNTER
Identified patient with two patient identifiers (name and ). Reviewed chart in preparation for encounter and have obtained necessary documentation.    Called and spoke with patient, informed her we have received cardiac clearance but not Pulmonary. Patient stated she has an appt on 24 with Pulmonary and hopefully will be cleared then. Patient is aware until all requirements are completed and submitted she can not be scheduled for her final review with Ashwini. Patient understood what was discussed and thankful for the follow up call.

## 2024-09-18 ENCOUNTER — TELEMEDICINE (OUTPATIENT)
Facility: CLINIC | Age: 64
End: 2024-09-18
Payer: MEDICARE

## 2024-09-18 DIAGNOSIS — U07.1 COVID: Primary | ICD-10-CM

## 2024-09-18 PROCEDURE — 99214 OFFICE O/P EST MOD 30 MIN: CPT | Performed by: FAMILY MEDICINE

## 2024-09-30 ENCOUNTER — HOSPITAL ENCOUNTER (OUTPATIENT)
Facility: HOSPITAL | Age: 64
Discharge: HOME | End: 2024-10-03
Attending: INTERNAL MEDICINE
Payer: MEDICARE

## 2024-09-30 DIAGNOSIS — Z87.891 FORMER SMOKER: ICD-10-CM

## 2024-09-30 DIAGNOSIS — Z87.891 PERSONAL HISTORY OF TOBACCO USE: ICD-10-CM

## 2024-09-30 DIAGNOSIS — J44.9 CHRONIC OBSTRUCTIVE PULMONARY DISEASE, UNSPECIFIED COPD TYPE (HCC): ICD-10-CM

## 2024-09-30 PROCEDURE — 71046 X-RAY EXAM CHEST 2 VIEWS: CPT

## 2024-09-30 PROCEDURE — 71271 CT THORAX LUNG CANCER SCR C-: CPT

## 2024-10-07 ENCOUNTER — OFFICE VISIT (OUTPATIENT)
Facility: CLINIC | Age: 64
End: 2024-10-07
Payer: MEDICARE

## 2024-10-07 VITALS
BODY MASS INDEX: 51.91 KG/M2 | OXYGEN SATURATION: 91 % | HEART RATE: 69 BPM | DIASTOLIC BLOOD PRESSURE: 76 MMHG | WEIGHT: 293 LBS | SYSTOLIC BLOOD PRESSURE: 128 MMHG | RESPIRATION RATE: 16 BRPM | HEIGHT: 63 IN | TEMPERATURE: 98.7 F

## 2024-10-07 DIAGNOSIS — I10 ESSENTIAL HYPERTENSION WITH GOAL BLOOD PRESSURE LESS THAN 130/80: ICD-10-CM

## 2024-10-07 DIAGNOSIS — H91.93 BILATERAL HEARING LOSS, UNSPECIFIED HEARING LOSS TYPE: ICD-10-CM

## 2024-10-07 DIAGNOSIS — Z12.31 ENCOUNTER FOR SCREENING MAMMOGRAM FOR MALIGNANT NEOPLASM OF BREAST: ICD-10-CM

## 2024-10-07 DIAGNOSIS — E11.9 CONTROLLED TYPE 2 DIABETES MELLITUS WITHOUT COMPLICATION, WITHOUT LONG-TERM CURRENT USE OF INSULIN (HCC): Primary | ICD-10-CM

## 2024-10-07 DIAGNOSIS — B37.9 YEAST INFECTION: ICD-10-CM

## 2024-10-07 PROCEDURE — 3074F SYST BP LT 130 MM HG: CPT | Performed by: FAMILY MEDICINE

## 2024-10-07 PROCEDURE — 3052F HG A1C>EQUAL 8.0%<EQUAL 9.0%: CPT | Performed by: FAMILY MEDICINE

## 2024-10-07 PROCEDURE — 3078F DIAST BP <80 MM HG: CPT | Performed by: FAMILY MEDICINE

## 2024-10-07 PROCEDURE — 99214 OFFICE O/P EST MOD 30 MIN: CPT | Performed by: FAMILY MEDICINE

## 2024-10-07 RX ORDER — FLUCONAZOLE 150 MG/1
150 TABLET ORAL ONCE
Qty: 1 TABLET | Refills: 1 | Status: SHIPPED | OUTPATIENT
Start: 2024-10-07 | End: 2024-10-07

## 2024-10-07 NOTE — PROGRESS NOTES
Chief Complaint   Patient presents with    Diabetes     Patient is here for a follow up      she is a 64 y.o. year old female who presents for follow-up of Diabetes     Diabetes - Pt well controlled on Metformin.      is  checking BS at home.  denies hypoglycemia symptoms.  has neuropathy symptoms.  Last eye exam .  Last foot exam unknown.    Questionaire:  Diabetes Report Card   1) Have you seen the eye doctor in past year?yes    2) How would you  rate your Diabetic Diet?OK   3) How well do you take care of your feet?Ok   4) Do you keep your Primary Care Follow Up Appts?yes    5) Do you know your A1C goal?no    6) Do you take your medications daily?yes    7) Do you check your blood sugars?yes    8) Have you gained weight?no    9) Have you lost weight?no    10) Do you follow an exercise program?no    11) Can you do better?yes            No results found for: \"HBA1C\"  Lab Results   Component Value Date/Time    CHOL 167 2024 09:48 AM    HDL 42 2024 09:48 AM    LDL 94.2 2024 09:48 AM    LDL 97 2023 12:00 AM     No results found for: \"MCA2\"      Reviewed and agree with Nurse Note and duplicated in this note.  Reviewed PmHx, RxHx, FmHx, SocHx, AllgHx and updated and dated in the chart.    Family History   Problem Relation Age of Onset    Breast Cancer Mother         Breast and then a lump in her next. She past away because of it.    Cancer Maternal Aunt         Bone Cancer which took her life.    Hypertension Brother     Obesity Brother         Both of my siblings had the weight  lost surgery    Hypertension Sister     Heart Attack Father          at the age of 49       Past Medical History:   Diagnosis Date    Anxiety     Arthritis     Chronic back pain 2017    Had falls when I was younger. Was diagnosed with degenerated disk disease    COPD (severe) home O2 (24hrs)     on oxygen 24 hours a day    Diabetes (HCC)     Hearing loss     My right ear will pop occasionally and when that

## 2024-10-08 LAB
EST. AVERAGE GLUCOSE BLD GHB EST-MCNC: 166 MG/DL
HBA1C MFR BLD: 7.4 % (ref 4–5.6)

## 2024-10-14 ENCOUNTER — TELEPHONE (OUTPATIENT)
Age: 64
End: 2024-10-14

## 2024-10-14 NOTE — TELEPHONE ENCOUNTER
I called the patient back and she wanted to know if we need more labs done and I told her that we have not ordered any further labs, she said it could of been an old my chart message. She then asked me if we received the clearance from pulmonary. I told her I will forward this message to Roxy tad she can let her know if she has received it and what if anything the patient needs to do. Pt in agreement and will look forawrd to talking to Roxy.

## 2024-10-14 NOTE — TELEPHONE ENCOUNTER
Pt received a My Chart message stating she needs to get a CBC, vitamin D, and Hydroxy 25. Advised nurse will follow up with whether pt needs to get labs done or not.

## 2024-11-13 ENCOUNTER — HOSPITAL ENCOUNTER (OUTPATIENT)
Age: 64
Discharge: HOME | End: 2024-11-16
Payer: MEDICARE

## 2024-11-13 VITALS — WEIGHT: 293 LBS | HEIGHT: 63 IN | BODY MASS INDEX: 51.91 KG/M2

## 2024-11-13 DIAGNOSIS — Z12.31 ENCOUNTER FOR SCREENING MAMMOGRAM FOR MALIGNANT NEOPLASM OF BREAST: ICD-10-CM

## 2024-11-13 PROCEDURE — 77067 SCR MAMMO BI INCL CAD: CPT

## 2024-12-13 ENCOUNTER — TELEPHONE (OUTPATIENT)
Age: 64
End: 2024-12-13

## 2024-12-13 NOTE — TELEPHONE ENCOUNTER
I called and spoke with the patient. I told her I do not see the document scanned into her chart. I will route the message to his nurse and ask her to check in the office on Monday. She acknowledged understanding and thanked me for the call.

## 2024-12-31 ENCOUNTER — TELEPHONE (OUTPATIENT)
Age: 64
End: 2024-12-31

## 2024-12-31 NOTE — TELEPHONE ENCOUNTER
I called the patient and I let her know that there is nothing scanned into her chart from her pulmonologist. She said she called his office today and they told her it was faxed on 11/22/24. Will await respone from Bariatric team. Pt in agreement.

## 2025-01-02 ENCOUNTER — TELEPHONE (OUTPATIENT)
Age: 65
End: 2025-01-02

## 2025-01-02 NOTE — TELEPHONE ENCOUNTER
Returned patients call to let her know that we have not yet received the pulmonary clearance.  Provided patient with fax number.

## 2025-01-27 RX ORDER — FUROSEMIDE 40 MG/1
40 TABLET ORAL DAILY
Qty: 90 TABLET | Refills: 2 | Status: SHIPPED | OUTPATIENT
Start: 2025-01-27

## 2025-01-27 RX ORDER — FLUCONAZOLE 150 MG/1
TABLET ORAL
Qty: 1 TABLET | Refills: 0 | Status: SHIPPED | OUTPATIENT
Start: 2025-01-27

## 2025-01-27 NOTE — TELEPHONE ENCOUNTER
Requested Prescriptions     Signed Prescriptions Disp Refills    furosemide (LASIX) 40 MG tablet 90 tablet 2     Sig: Take 1 tablet by mouth once daily     Authorizing Provider: ADE LAROSE     Ordering User: JEANNIE OBRIEN MD    Future Appointments   Date Time Provider Department Center   3/18/2025 10:30 AM Che Kiser MD [facility]   8/20/2025  2:00 PM [facility]   8/20/2025  2:40 PM Ade Larose MD [facility]

## 2025-02-03 RX ORDER — METFORMIN HYDROCHLORIDE 500 MG/1
1000 TABLET, EXTENDED RELEASE ORAL 2 TIMES DAILY
Qty: 120 TABLET | Refills: 5 | Status: SHIPPED | OUTPATIENT
Start: 2025-02-03 | End: 2025-08-02

## 2025-03-13 RX ORDER — FLUCONAZOLE 150 MG/1
TABLET ORAL
Qty: 1 TABLET | Refills: 0 | OUTPATIENT
Start: 2025-03-13

## 2025-03-16 DIAGNOSIS — E11.9 CONTROLLED TYPE 2 DIABETES MELLITUS WITHOUT COMPLICATION, WITHOUT LONG-TERM CURRENT USE OF INSULIN: ICD-10-CM

## 2025-03-17 RX ORDER — BLOOD SUGAR DIAGNOSTIC
STRIP MISCELLANEOUS
Qty: 100 EACH | Refills: 0 | Status: SHIPPED | OUTPATIENT
Start: 2025-03-17

## 2025-03-17 RX ORDER — FLUCONAZOLE 150 MG/1
TABLET ORAL
Qty: 1 TABLET | Refills: 0 | Status: SHIPPED | OUTPATIENT
Start: 2025-03-17

## 2025-04-17 ENCOUNTER — TELEPHONE (OUTPATIENT)
Age: 65
End: 2025-04-17

## 2025-04-17 NOTE — TELEPHONE ENCOUNTER
Spoke with patient and scheduled SWL refresher scheduled with Gwendolyn on 4/18. Can be scheduled for final review after appt with Gwendolyn.  Patient stated that she may be moving to [place].

## 2025-04-18 ENCOUNTER — OFFICE VISIT (OUTPATIENT)
Age: 65
End: 2025-04-18

## 2025-04-18 ENCOUNTER — TELEPHONE (OUTPATIENT)
Age: 65
End: 2025-04-18

## 2025-04-18 DIAGNOSIS — E66.01 MORBID OBESITY: Primary | ICD-10-CM

## 2025-04-18 NOTE — PROGRESS NOTES
[facility]  Supervised Weight Loss     Date:   2025    Patient's Name: Alissa Fish  : 1960    nsurance:  Medicare          Session: 5 of 4  Surgery: Sleeve gastrectomy                      Surgeon:  Dr. Paddy Maradiaga     Height: 63 inches       Weight:  330   Lbs (pt stated).                                   BMI: 57             Pounds Lost since last month: 0               Pounds Gained since last month:      Starting Weight: 335 lbs                   Previous Month’s Weight: 326 lbs  Overall Pounds Lost: 5 lbs              Overall Pounds Gained: 0     Other Pertinent Information: Today's appointment was completed in a virtual setting. Surgeon recommending 10 lb weight loss before surgery.      Smoking Status:  None  Alcohol Intake: None    I have reviewed with pt the guidelines of the supervised wt loss program.  Pt understands the expectations of some wt loss during the program and that wt gain could delay the process. I have also explained that appointments need to be consecutive and missing an appointment may result in starting over. Pt has received this information in writing.        Changes that patient has made since last month include:  none reported.    Eating Habits and Behaviors  General healthy eating guidelines were also discussed. Pts were instructed that their plate should be made up 1/2 plate coming from non-starchy vegetables, 1/4 coming from lean meat, and 1/4 of their plate coming from carbohydrates, including fruits, starches, or milk. We discussed measuring meals to 1/2 cup total per meal after surgery. Drinking only calorie-free, sugar-free and non-carbonated beverages. We discussed the importance of drinking 64 ounces of fluid per day to prevent dehydration post-operatively.                       Patient's current diet habits include: Pt is eating 3 meals per day (B: protein shake-Premier or 2 eggs, toast or cereal; L: skips or 1/2

## 2025-04-18 NOTE — TELEPHONE ENCOUNTER
Attempted tor each patient to schedule Final Review with Dr. Maradiaga.     Patient is ready for scheduling!     Schedule as est. Patient 20 minutes \"Final Review\"

## 2025-04-21 ENCOUNTER — TELEPHONE (OUTPATIENT)
Age: 65
End: 2025-04-21

## 2025-04-22 NOTE — TELEPHONE ENCOUNTER
Left Vm for patient to call and schedule Final Review. Sent [employer] message to patient to call and schedule as well.

## 2025-08-11 RX ORDER — CARVEDILOL 12.5 MG/1
12.5 TABLET ORAL 2 TIMES DAILY WITH MEALS
Qty: 180 TABLET | Refills: 0 | Status: SHIPPED | OUTPATIENT
Start: 2025-08-11

## (undated) DEVICE — ANGIOGRAPHIC CATHETER: Brand: IMPULSE™

## (undated) DEVICE — TR BAND RADIAL ARTERY COMPRESSION DEVICE: Brand: TR BAND

## (undated) DEVICE — KIT MED IMAG CNTRST AGNT W/ IOPAMIDOL REUSE

## (undated) DEVICE — KIT MFLD ISOLATN NACL CNTRST PRT TBNG SPIK W/ PRSS TRNSDUC

## (undated) DEVICE — PACK PROCEDURE SURG HRT CATH

## (undated) DEVICE — FORCEPS BX L240CM JAW DIA2.8MM L CAP W/ NDL MIC MESH TOOTH

## (undated) DEVICE — TUBING IRRIG COMPATIBLE W ERBE MEDIVATOR PMP HYDR

## (undated) DEVICE — ANGIOGRAPHY KIT

## (undated) DEVICE — ORISE PROKNIFE 1.5 MM ELECTRODE: Brand: ORISE™ PROKNIFE

## (undated) DEVICE — KIT HND CTRL 3 W STPCOCK ROT END 54IN PREM HI PRSS TBNG AT

## (undated) DEVICE — PROCEDURE KIT FLUID MGMT CUST MAINFOLD STRL

## (undated) DEVICE — ORISE PROKNIFE 3.0 MM ELECTRODE: Brand: ORISE™ PROKNIFE

## (undated) DEVICE — SPECIAL PROCEDURE DRAPE 32" X 34": Brand: SPECIAL PROCEDURE DRAPE